# Patient Record
Sex: MALE | Race: WHITE | Employment: FULL TIME | URBAN - METROPOLITAN AREA
[De-identification: names, ages, dates, MRNs, and addresses within clinical notes are randomized per-mention and may not be internally consistent; named-entity substitution may affect disease eponyms.]

---

## 2017-06-07 ENCOUNTER — HOSPITAL ENCOUNTER (OUTPATIENT)
Dept: CT IMAGING | Age: 52
Discharge: HOME | End: 2017-06-07
Attending: FAMILY MEDICINE
Payer: COMMERCIAL

## 2017-06-07 VITALS — BODY MASS INDEX: 29.55 KG/M2 | HEIGHT: 68 IN | WEIGHT: 195 LBS

## 2017-06-07 DIAGNOSIS — R10.32 LLQ PAIN: ICD-10-CM

## 2017-06-07 PROCEDURE — 74011636320 HC RX REV CODE- 636/320: Performed by: FAMILY MEDICINE

## 2017-06-07 PROCEDURE — 74011000258 HC RX REV CODE- 258: Performed by: FAMILY MEDICINE

## 2017-06-07 PROCEDURE — 74177 CT ABD & PELVIS W/CONTRAST: CPT

## 2017-06-07 RX ORDER — SODIUM CHLORIDE 0.9 % (FLUSH) 0.9 %
10 SYRINGE (ML) INJECTION
Status: COMPLETED | OUTPATIENT
Start: 2017-06-07 | End: 2017-06-07

## 2017-06-07 RX ADMIN — DIATRIZOATE MEGLUMINE AND DIATRIZOATE SODIUM 15 ML: 660; 100 LIQUID ORAL; RECTAL at 09:19

## 2017-06-07 RX ADMIN — Medication 10 ML: at 09:19

## 2017-06-07 RX ADMIN — SODIUM CHLORIDE 100 ML: 900 INJECTION, SOLUTION INTRAVENOUS at 09:19

## 2017-06-07 RX ADMIN — IOPAMIDOL 100 ML: 755 INJECTION, SOLUTION INTRAVENOUS at 09:19

## 2017-12-07 ENCOUNTER — HOSPITAL ENCOUNTER (OUTPATIENT)
Dept: SURGERY | Age: 52
Discharge: HOME | End: 2017-12-07

## 2017-12-11 VITALS — HEIGHT: 67 IN | BODY MASS INDEX: 32.18 KG/M2 | WEIGHT: 205 LBS

## 2017-12-11 NOTE — PERIOP NOTES
Patient verified name, , and procedure. Type: 1a; abbreviated assessment per anesthesia guidelines  Labs per surgeon: none  Labs per anesthesia: none    Instructed pt that they will be notified via office/GI LAB for time of arrival to GI lab. Follow diet and prep instructions per office. May have clear liquids until 4 hours prior to time of arrival.     [place] Roxo or shower the night before and the am of surgery with antibacterial soap. No lotions, oils, powders, cologne on skin. No make up, eye make up or jewelry. Wear loose fitting comfortable, clean clothing. Must have adult present in building the entire time . Medications for the day of procedure none, patient to hold none    The following discharge instructions reviewed with patient: medication given during procedure may cause drowsiness for several hours, therefore, do not drive or operate machinery for remainder of the day. You may not drink alcohol on the day of your procedure, please resume regular diet and activity unless otherwise directed. You may experience abdominal distention for several hours that is relieved by the passage of gas. Contact your physician if you have any of the following: fever or chills, severe abdominal pain or excessive amount of bleeding or a large amount when having a bowel movement.  Occasional specks of blood with bowel movement would not be unusual.

## 2017-12-12 ENCOUNTER — ANESTHESIA EVENT (OUTPATIENT)
Dept: ENDOSCOPY | Age: 52
End: 2017-12-12
Payer: COMMERCIAL

## 2017-12-12 RX ORDER — ALBUTEROL SULFATE 0.83 MG/ML
2.5 SOLUTION RESPIRATORY (INHALATION) AS NEEDED
Status: CANCELLED | OUTPATIENT
Start: 2017-12-12

## 2017-12-12 RX ORDER — DIPHENHYDRAMINE HYDROCHLORIDE 50 MG/ML
12.5 INJECTION, SOLUTION INTRAMUSCULAR; INTRAVENOUS
Status: CANCELLED | OUTPATIENT
Start: 2017-12-12

## 2017-12-12 RX ORDER — OXYCODONE HYDROCHLORIDE 5 MG/1
10 TABLET ORAL
Status: CANCELLED | OUTPATIENT
Start: 2017-12-12

## 2017-12-12 RX ORDER — NALOXONE HYDROCHLORIDE 0.4 MG/ML
0.1 INJECTION, SOLUTION INTRAMUSCULAR; INTRAVENOUS; SUBCUTANEOUS AS NEEDED
Status: CANCELLED | OUTPATIENT
Start: 2017-12-12

## 2017-12-12 RX ORDER — MIDAZOLAM HYDROCHLORIDE 1 MG/ML
2 INJECTION, SOLUTION INTRAMUSCULAR; INTRAVENOUS
Status: CANCELLED | OUTPATIENT
Start: 2017-12-12

## 2017-12-12 RX ORDER — OXYCODONE HYDROCHLORIDE 5 MG/1
5 TABLET ORAL
Status: CANCELLED | OUTPATIENT
Start: 2017-12-12

## 2017-12-12 RX ORDER — MIDAZOLAM HYDROCHLORIDE 1 MG/ML
2 INJECTION, SOLUTION INTRAMUSCULAR; INTRAVENOUS ONCE
Status: CANCELLED | OUTPATIENT
Start: 2017-12-12 | End: 2017-12-12

## 2017-12-12 RX ORDER — HYDROMORPHONE HYDROCHLORIDE 2 MG/ML
0.5 INJECTION, SOLUTION INTRAMUSCULAR; INTRAVENOUS; SUBCUTANEOUS
Status: CANCELLED | OUTPATIENT
Start: 2017-12-12

## 2017-12-12 RX ORDER — SODIUM CHLORIDE, SODIUM LACTATE, POTASSIUM CHLORIDE, CALCIUM CHLORIDE 600; 310; 30; 20 MG/100ML; MG/100ML; MG/100ML; MG/100ML
100 INJECTION, SOLUTION INTRAVENOUS CONTINUOUS
Status: CANCELLED | OUTPATIENT
Start: 2017-12-12

## 2017-12-12 RX ORDER — FENTANYL CITRATE 50 UG/ML
100 INJECTION, SOLUTION INTRAMUSCULAR; INTRAVENOUS ONCE
Status: CANCELLED | OUTPATIENT
Start: 2017-12-12 | End: 2017-12-12

## 2017-12-12 RX ORDER — ONDANSETRON 2 MG/ML
4 INJECTION INTRAMUSCULAR; INTRAVENOUS ONCE
Status: CANCELLED | OUTPATIENT
Start: 2017-12-12 | End: 2017-12-12

## 2017-12-13 ENCOUNTER — ANESTHESIA (OUTPATIENT)
Dept: ENDOSCOPY | Age: 52
End: 2017-12-13
Payer: COMMERCIAL

## 2017-12-13 ENCOUNTER — HOSPITAL ENCOUNTER (OUTPATIENT)
Age: 52
Setting detail: OUTPATIENT SURGERY
Discharge: HOME | End: 2017-12-13
Attending: SURGERY | Admitting: SURGERY
Payer: COMMERCIAL

## 2017-12-13 VITALS
OXYGEN SATURATION: 95 % | SYSTOLIC BLOOD PRESSURE: 142 MMHG | HEART RATE: 69 BPM | HEIGHT: 67 IN | DIASTOLIC BLOOD PRESSURE: 93 MMHG | WEIGHT: 200.3 LBS | RESPIRATION RATE: 20 BRPM | TEMPERATURE: 98.6 F | BODY MASS INDEX: 31.44 KG/M2

## 2017-12-13 PROCEDURE — 74011250636 HC RX REV CODE- 250/636: Performed by: ANESTHESIOLOGY

## 2017-12-13 PROCEDURE — 74011000250 HC RX REV CODE- 250

## 2017-12-13 PROCEDURE — 74011000250 HC RX REV CODE- 250: Performed by: ANESTHESIOLOGY

## 2017-12-13 PROCEDURE — 76040000025: Performed by: SURGERY

## 2017-12-13 PROCEDURE — 74011250636 HC RX REV CODE- 250/636

## 2017-12-13 PROCEDURE — 76060000031 HC ANESTHESIA FIRST 0.5 HR: Performed by: SURGERY

## 2017-12-13 RX ORDER — LIDOCAINE HYDROCHLORIDE 20 MG/ML
INJECTION, SOLUTION EPIDURAL; INFILTRATION; INTRACAUDAL; PERINEURAL AS NEEDED
Status: DISCONTINUED | OUTPATIENT
Start: 2017-12-13 | End: 2017-12-13 | Stop reason: HOSPADM

## 2017-12-13 RX ORDER — LIDOCAINE HYDROCHLORIDE 10 MG/ML
0.1 INJECTION INFILTRATION; PERINEURAL AS NEEDED
Status: DISCONTINUED | OUTPATIENT
Start: 2017-12-13 | End: 2017-12-13 | Stop reason: HOSPADM

## 2017-12-13 RX ORDER — SODIUM CHLORIDE, SODIUM LACTATE, POTASSIUM CHLORIDE, CALCIUM CHLORIDE 600; 310; 30; 20 MG/100ML; MG/100ML; MG/100ML; MG/100ML
100 INJECTION, SOLUTION INTRAVENOUS CONTINUOUS
Status: DISCONTINUED | OUTPATIENT
Start: 2017-12-13 | End: 2017-12-13 | Stop reason: HOSPADM

## 2017-12-13 RX ORDER — PROPOFOL 10 MG/ML
INJECTION, EMULSION INTRAVENOUS AS NEEDED
Status: DISCONTINUED | OUTPATIENT
Start: 2017-12-13 | End: 2017-12-13 | Stop reason: HOSPADM

## 2017-12-13 RX ADMIN — PROPOFOL 25 MG: 10 INJECTION, EMULSION INTRAVENOUS at 12:15

## 2017-12-13 RX ADMIN — PROPOFOL 50 MG: 10 INJECTION, EMULSION INTRAVENOUS at 12:08

## 2017-12-13 RX ADMIN — PROPOFOL 25 MG: 10 INJECTION, EMULSION INTRAVENOUS at 12:19

## 2017-12-13 RX ADMIN — PROPOFOL 50 MG: 10 INJECTION, EMULSION INTRAVENOUS at 12:12

## 2017-12-13 RX ADMIN — LIDOCAINE HYDROCHLORIDE 0.1 ML: 10 INJECTION, SOLUTION INFILTRATION; PERINEURAL at 11:53

## 2017-12-13 RX ADMIN — PROPOFOL 75 MG: 10 INJECTION, EMULSION INTRAVENOUS at 12:06

## 2017-12-13 RX ADMIN — LIDOCAINE HYDROCHLORIDE 60 MG: 20 INJECTION, SOLUTION EPIDURAL; INFILTRATION; INTRACAUDAL; PERINEURAL at 12:06

## 2017-12-13 RX ADMIN — SODIUM CHLORIDE, SODIUM LACTATE, POTASSIUM CHLORIDE, AND CALCIUM CHLORIDE 100 ML/HR: 600; 310; 30; 20 INJECTION, SOLUTION INTRAVENOUS at 11:53

## 2017-12-13 RX ADMIN — PROPOFOL 25 MG: 10 INJECTION, EMULSION INTRAVENOUS at 12:11

## 2017-12-13 RX ADMIN — PROPOFOL 25 MG: 10 INJECTION, EMULSION INTRAVENOUS at 12:10

## 2017-12-13 NOTE — ANESTHESIA POSTPROCEDURE EVALUATION
Post-Anesthesia Evaluation and Assessment    Patient: Belkis Sánchez MRN: [id number]  SSN: [id number]    YOB: 1965  Age: 50 y.o. Sex: male       Cardiovascular Function/Vital Signs  Visit Vitals    /76    Pulse 85    Temp 37 °C (98.6 °F)    Resp 18    Ht 5' 7\" (1.702 m)    Wt 90.9 kg (200 lb 4.8 oz)    SpO2 96%    BMI 31.37 kg/m2       Patient is status post total IV anesthesia anesthesia for Procedure(s):  COLONOSCOPY  BMI 31. Nausea/Vomiting: None    Postoperative hydration reviewed and adequate. Pain:  Pain Scale 1: Numeric (0 - 10) (12/13/17 1123)  Pain Intensity 1: 0 (12/13/17 1123)   Managed    Neurological Status: At baseline    Mental Status and Level of Consciousness: Arousable    Pulmonary Status:   O2 Device: Nasal cannula (12/13/17 1228)   Adequate oxygenation and airway patent    Complications related to anesthesia: None    Post-anesthesia assessment completed.  No concerns    Signed By: Tommy Encarnacion MD     December 13, 2017

## 2017-12-13 NOTE — DISCHARGE INSTRUCTIONS
Kulwant Kidd M.D.  [phone]    Instructions following colonoscopy:    ACTIVITY:   Resume usual, basic activities around the house today.  You may be light-headed or sleepy from anesthesia, so be careful going up and down stairs.  Avoid driving, operating machinery, or signing documents for 24 hours. DIET:   No restriction. Please note, some people may have nausea or cramps after this procedure which can result in an upset stomach after eating.  Many people have loose stools or diarrhea immediately after colonoscopy. It is also not uncommon to not have a bowel movement for 2-3 days. PAIN:   Some cramping or gas pain is normal after colonoscopy. However, if you experience worsening pain over the course of the day, or pain with associated fever please call the office immediately      [address] IF:   You have a temperature higher than 101.5° Fahrenheit for more than 6 hours.  You have severe nausea or vomiting not relieved by medication; or diarrhea. If you take a blood thinning medicine resume it:    Otherwise, continue home medications as previously prescribed.

## 2017-12-13 NOTE — PROCEDURES
Procedure in Detail:  Informed consent was obtained for the procedure. The patient was placed in the left lateral decubitus position and sedation was induced by anesthesia. The [id number] was inserted into the rectum and advanced under direct vision to the cecum, which was identified by the ileocecal valve and appendiceal orifice. The quality of the colonic preparation was good. A careful inspection was made as the colonoscope was withdrawn, including a retroflexed view of the rectum; findings and interventions are described below. Appropriate photodocumentation was obtained. Findings:   Rectum:   Normal  Sigmoid:     - Excavated lesions:     - rare, shallow diverticulum  Descending Colon:   Normal  Transverse Colon:   Normal  Ascending Colon:   Normal  Cecum:   Normal            Specimens: No specimens were collected. Complications: None; patient tolerated the procedure well. \    EBL - none    Recommendations:   - For colon cancer screening in this average-risk patient, colonoscopy may be repeated in 10 years.      Signed By: Howie Bermudez MD                        December 13, 2017

## 2017-12-13 NOTE — IP AVS SNAPSHOT
[address] [facility] 
[phone] Patient: Minor Neighbors. MRN: [id number] :1965 About your hospitalization You were admitted on:  2017 You last received care in the:  [facility] PREOP You were discharged on:  2017 Why you were hospitalized Your primary diagnosis was:  Not on File Discharge Orders None A check brayan indicates which time of day the medication should be taken. My Medications ASK your physician about these medications Instructions Each Dose to Equal  
 Morning Noon Evening Bedtime CLARITIN 10 mg tablet Generic drug:  loratadine Your last dose was: Your next dose is: Take 10 mg by mouth daily. 10 mg Discharge Instructions Mohini Sarabia M.D. 
[phone] Instructions following colonoscopy: 
 
ACTIVITY: 
? Resume usual, basic activities around the house today. ? You may be light-headed or sleepy from anesthesia, so be careful going up and down stairs. ? Avoid driving, operating machinery, or signing documents for 24 hours. DIET: 
? No restriction. Please note, some people may have nausea or cramps after this procedure which can result in an upset stomach after eating. ? Many people have loose stools or diarrhea immediately after colonoscopy. It is also not uncommon to not have a bowel movement for 2-3 days. PAIN: 
? Some cramping or gas pain is normal after colonoscopy. However, if you experience worsening pain over the course of the day, or pain with associated fever please call the office immediately CALL THE DOCTOR IF: 
? You have a temperature higher than 101.5° Fahrenheit for more than 6 hours. ? You have severe nausea or vomiting not relieved by medication; or diarrhea. If you take a blood thinning medicine resume it: Otherwise, continue home medications as previously prescribed. Introducing [facility]! [employer] introduces [employer] patient portal. Now you can access parts of your medical record, email your doctor's office, and request medication refills online. 1. In your internet browser, go to [email or web address] 2. Click on the First Time User? Click Here link in the Sign In box. You will see the New Member Sign Up page. 3. Enter your [employer] Access Code exactly as it appears below. You will not need to use this code after youve completed the sign-up process. If you do not sign up before the expiration date, you must request a new code. · [employer] Access Code: [email or web address] Expires: 3/12/2018 10:38 AM 
 
4. Enter the last four digits of your Social Security Number (xxxx) and Date of Birth (mm/dd/yyyy) as indicated and click Submit. You will be taken to the next sign-up page. 5. Create a [employer] ID. This will be your [employer] login ID and cannot be changed, so think of one that is secure and easy to remember. 6. Create a [employer] password. You can change your password at any time. 7. Enter your Password Reset Question and Answer. This can be used at a later time if you forget your password. 8. Enter your e-mail address. You will receive e-mail notification when new information is available in [address] 9. Click Sign Up. You can now view and download portions of your medical record. 10. Click the Download Summary menu link to download a portable copy of your medical information. If you have questions, please visit the Frequently Asked Questions section of the [employer] website. Remember, [employer] is NOT to be used for urgent needs. For medical emergencies, dial 911. Now available from your iPhone and Android! Providers Seen During Your Hospitalization Provider Specialty Primary office phone Sherice Jama MD General Surgery [phone] Your Primary Care Physician (PCP) Primary Care Physician Office Phone Office Fax Via Roger Agrawal [address] [place] [address] [phone] You are allergic to the following Allergen Reactions Darvocet A500 (Propoxyphene N-Acetaminophen) Nausea and Vomiting Recent Documentation Height Weight BMI Smoking Status 1.702 m 90.9 kg 31.37 kg/m2 Never Smoker Emergency Contacts Name Discharge Info Relation Home Work Mobile Parker Carlos [3] [phone] Patient Belongings The following personal items are in your possession at time of discharge: 
  Dental Appliances: None Please provide this summary of care documentation to your next provider. Signatures-by signing, you are acknowledging that this After Visit Summary has been reviewed with you and you have received a copy. Patient Signature:  ____________________________________________________________ Date:  ____________________________________________________________  
  
Rey Reyna Provider Signature:  ____________________________________________________________ Date:  ____________________________________________________________

## 2017-12-13 NOTE — H&P
Colonoscopy History and Physical      Patient: Meliton Walsh. Physician: Laura Sanchez MD    Referring Physician: Ainsley Garcia MD    Chief Complaint: For colonoscopy    History of Present Illness: Pt presents for colonoscopy. Prior (-) exam 2008 in f/u to percutaneous drainage of diverticular abscess. Had short flare of diverticulitis this past May. History:  Past Medical History:   Diagnosis Date    Diverticulosis     H/O seasonal allergies     Unspecified sleep apnea     C-Pap     Past Surgical History:   Procedure Laterality Date    ABDOMEN SURGERY PROC UNLISTED  2008    perc drain diverticular abscess    HX COLONOSCOPY  2008    no polyps    HX HERNIA REPAIR      HX ORTHOPAEDIC Right 2009    rolled bicept      Social History     Social History    Marital status:      Spouse name: N/A    Number of children: N/A    Years of education: N/A     Social History Main Topics    Smoking status: Never Smoker    Smokeless tobacco: Never Used    Alcohol use No    Drug use: No    Sexual activity: Yes     Partners: Female     Other Topics Concern    None     Social History Narrative      Family History   Problem Relation Age of Onset    Diabetes Mother     COPD Mother     Kidney Disease Mother     Cancer Father      prostate    Other Father      abdominal aortic aneurysm    Diabetes Maternal Grandmother     Diabetes Sister     Malignant Hyperthermia Neg Hx     Pseudocholinesterase Deficiency Neg Hx     Delayed Awakening Neg Hx     Post-op Nausea/Vomiting Neg Hx     Emergence Delirium Neg Hx     Post-op Cognitive Dysfunction Neg Hx        Medications:   Prior to Admission medications    Medication Sig Start Date End Date Taking? Authorizing Provider   loratadine (CLARITIN) 10 mg tablet Take 10 mg by mouth daily. Historical Provider       Allergies:    Allergies   Allergen Reactions    Darvocet A500 [Propoxyphene N-Acetaminophen] Nausea and Vomiting       Physical Exam:     Vital Signs:   Visit Vitals    BP (!) 142/95    Pulse 63    Temp 98.6 °F (37 °C)    Resp 16    Ht 5' 7\" (1.702 m)    Wt 200 lb 4.8 oz (90.9 kg)    SpO2 96%    BMI 31.37 kg/m2     . General: in NAD      Heart: regular   Lungs: unlabored   Abdominal: soft   Neurological: grossly normal        Findings/Diagnosis: Screening for colorectal cancer     Plan of Care/Planned Procedure: Colonoscopy. Risks of endoscopy include  bleeding, perforation. They understand and agree to proceed.       Signed:  Michelle Bradford MD   12/13/2017

## 2017-12-13 NOTE — ANESTHESIA PREPROCEDURE EVALUATION
Anesthetic History               Review of Systems / Medical History  Patient summary reviewed, nursing notes reviewed and pertinent labs reviewed    Pulmonary                   Neuro/Psych              Cardiovascular                  Exercise tolerance: >4 METS     GI/Hepatic/Renal                Endo/Other             Other Findings              Physical Exam    Airway  Mallampati: I  TM Distance: 4 - 6 cm  Neck ROM: normal range of motion   Mouth opening: Normal     Cardiovascular  Regular rate and rhythm,  S1 and S2 normal,  no murmur, click, rub, or gallop             Dental  No notable dental hx       Pulmonary  Breath sounds clear to auscultation               Abdominal         Other Findings            Anesthetic Plan    ASA: 1  Anesthesia type: total IV anesthesia          Induction: Intravenous  Anesthetic plan and risks discussed with: Patient

## 2018-10-12 RX ORDER — SODIUM CHLORIDE 0.9 % (FLUSH) 0.9 %
5-10 SYRINGE (ML) INJECTION EVERY 8 HOURS
Status: CANCELLED | OUTPATIENT
Start: 2018-10-12

## 2018-10-12 RX ORDER — SODIUM CHLORIDE 0.9 % (FLUSH) 0.9 %
5-10 SYRINGE (ML) INJECTION AS NEEDED
Status: CANCELLED | OUTPATIENT
Start: 2018-10-12

## 2018-10-17 RX ORDER — SODIUM CHLORIDE 0.9 % (FLUSH) 0.9 %
5-10 SYRINGE (ML) INJECTION AS NEEDED
Status: CANCELLED | OUTPATIENT
Start: 2018-10-17

## 2018-10-17 RX ORDER — SODIUM CHLORIDE 0.9 % (FLUSH) 0.9 %
5-10 SYRINGE (ML) INJECTION EVERY 8 HOURS
Status: CANCELLED | OUTPATIENT
Start: 2018-10-17

## 2018-10-18 ENCOUNTER — ANESTHESIA EVENT (OUTPATIENT)
Dept: SURGERY | Age: 53
End: 2018-10-18
Payer: COMMERCIAL

## 2018-10-18 RX ORDER — SODIUM CHLORIDE, SODIUM LACTATE, POTASSIUM CHLORIDE, CALCIUM CHLORIDE 600; 310; 30; 20 MG/100ML; MG/100ML; MG/100ML; MG/100ML
125 INJECTION, SOLUTION INTRAVENOUS CONTINUOUS
Status: CANCELLED | OUTPATIENT
Start: 2018-10-18

## 2018-10-18 RX ORDER — NALOXONE HYDROCHLORIDE 0.4 MG/ML
0.1 INJECTION, SOLUTION INTRAMUSCULAR; INTRAVENOUS; SUBCUTANEOUS AS NEEDED
Status: CANCELLED | OUTPATIENT
Start: 2018-10-18

## 2018-10-18 RX ORDER — SODIUM CHLORIDE 0.9 % (FLUSH) 0.9 %
5-10 SYRINGE (ML) INJECTION AS NEEDED
Status: CANCELLED | OUTPATIENT
Start: 2018-10-18

## 2018-10-18 RX ORDER — HYDROMORPHONE HYDROCHLORIDE 2 MG/ML
0.5 INJECTION, SOLUTION INTRAMUSCULAR; INTRAVENOUS; SUBCUTANEOUS
Status: CANCELLED | OUTPATIENT
Start: 2018-10-18

## 2018-10-18 RX ORDER — KETOROLAC TROMETHAMINE 30 MG/ML
30 INJECTION, SOLUTION INTRAMUSCULAR; INTRAVENOUS AS NEEDED
Status: CANCELLED | OUTPATIENT
Start: 2018-10-18 | End: 2018-10-18

## 2018-10-18 RX ORDER — OXYCODONE HYDROCHLORIDE 5 MG/1
10 TABLET ORAL
Status: CANCELLED | OUTPATIENT
Start: 2018-10-18 | End: 2018-10-19

## 2018-10-19 ENCOUNTER — ANESTHESIA (OUTPATIENT)
Dept: SURGERY | Age: 53
End: 2018-10-19
Payer: COMMERCIAL

## 2018-10-19 ENCOUNTER — HOSPITAL ENCOUNTER (OUTPATIENT)
Age: 53
Setting detail: OUTPATIENT SURGERY
Discharge: HOME | End: 2018-10-19
Attending: ORTHOPAEDIC SURGERY | Admitting: ORTHOPAEDIC SURGERY
Payer: COMMERCIAL

## 2018-10-19 VITALS
BODY MASS INDEX: 32.11 KG/M2 | TEMPERATURE: 97.8 F | HEART RATE: 66 BPM | SYSTOLIC BLOOD PRESSURE: 125 MMHG | WEIGHT: 205 LBS | RESPIRATION RATE: 15 BRPM | OXYGEN SATURATION: 99 % | DIASTOLIC BLOOD PRESSURE: 79 MMHG

## 2018-10-19 PROCEDURE — 74011250636 HC RX REV CODE- 250/636

## 2018-10-19 PROCEDURE — 77030038020 HC MANFLD NEPTUNE STRY -B: Performed by: ORTHOPAEDIC SURGERY

## 2018-10-19 PROCEDURE — 74011250637 HC RX REV CODE- 250/637: Performed by: ANESTHESIOLOGY

## 2018-10-19 PROCEDURE — 76010000154 HC OR TIME FIRST 0.5 HR: Performed by: ORTHOPAEDIC SURGERY

## 2018-10-19 PROCEDURE — 77030020143 HC AIRWY LARYN INTUB CGAS -A: Performed by: ANESTHESIOLOGY

## 2018-10-19 PROCEDURE — 77030029203 HC IRR KT CYSTO/TUR BBMI -A: Performed by: ORTHOPAEDIC SURGERY

## 2018-10-19 PROCEDURE — 76060000032 HC ANESTHESIA 0.5 TO 1 HR: Performed by: ORTHOPAEDIC SURGERY

## 2018-10-19 PROCEDURE — 77030000032 HC CUF TRNQT ZIMM -B: Performed by: ORTHOPAEDIC SURGERY

## 2018-10-19 PROCEDURE — 74011250636 HC RX REV CODE- 250/636: Performed by: ORTHOPAEDIC SURGERY

## 2018-10-19 PROCEDURE — 76210000063 HC OR PH I REC FIRST 0.5 HR: Performed by: ORTHOPAEDIC SURGERY

## 2018-10-19 PROCEDURE — 76210000020 HC REC RM PH II FIRST 0.5 HR: Performed by: ORTHOPAEDIC SURGERY

## 2018-10-19 PROCEDURE — 77030006668 HC BLD SHV MENSCS STRY -B: Performed by: ORTHOPAEDIC SURGERY

## 2018-10-19 PROCEDURE — 74011250636 HC RX REV CODE- 250/636: Performed by: ANESTHESIOLOGY

## 2018-10-19 PROCEDURE — 77030018836 HC SOL IRR NACL ICUM -A: Performed by: ORTHOPAEDIC SURGERY

## 2018-10-19 RX ORDER — PROPOFOL 10 MG/ML
INJECTION, EMULSION INTRAVENOUS AS NEEDED
Status: DISCONTINUED | OUTPATIENT
Start: 2018-10-19 | End: 2018-10-19 | Stop reason: HOSPADM

## 2018-10-19 RX ORDER — ACETAMINOPHEN 500 MG
1000 TABLET ORAL ONCE
Status: DISCONTINUED | OUTPATIENT
Start: 2018-10-19 | End: 2018-10-19 | Stop reason: HOSPADM

## 2018-10-19 RX ORDER — SODIUM CHLORIDE 0.9 % (FLUSH) 0.9 %
5-10 SYRINGE (ML) INJECTION AS NEEDED
Status: DISCONTINUED | OUTPATIENT
Start: 2018-10-19 | End: 2018-10-19 | Stop reason: HOSPADM

## 2018-10-19 RX ORDER — ROPIVACAINE HYDROCHLORIDE 5 MG/ML
INJECTION, SOLUTION EPIDURAL; INFILTRATION; PERINEURAL AS NEEDED
Status: DISCONTINUED | OUTPATIENT
Start: 2018-10-19 | End: 2018-10-19 | Stop reason: HOSPADM

## 2018-10-19 RX ORDER — SODIUM CHLORIDE 0.9 % (FLUSH) 0.9 %
5-10 SYRINGE (ML) INJECTION EVERY 8 HOURS
Status: DISCONTINUED | OUTPATIENT
Start: 2018-10-19 | End: 2018-10-19 | Stop reason: HOSPADM

## 2018-10-19 RX ORDER — KETOROLAC TROMETHAMINE 30 MG/ML
INJECTION, SOLUTION INTRAMUSCULAR; INTRAVENOUS AS NEEDED
Status: DISCONTINUED | OUTPATIENT
Start: 2018-10-19 | End: 2018-10-19 | Stop reason: HOSPADM

## 2018-10-19 RX ORDER — LIDOCAINE HYDROCHLORIDE 20 MG/ML
INJECTION, SOLUTION EPIDURAL; INFILTRATION; INTRACAUDAL; PERINEURAL AS NEEDED
Status: DISCONTINUED | OUTPATIENT
Start: 2018-10-19 | End: 2018-10-19 | Stop reason: HOSPADM

## 2018-10-19 RX ORDER — DEXAMETHASONE SODIUM PHOSPHATE 4 MG/ML
INJECTION, SOLUTION INTRA-ARTICULAR; INTRALESIONAL; INTRAMUSCULAR; INTRAVENOUS; SOFT TISSUE AS NEEDED
Status: DISCONTINUED | OUTPATIENT
Start: 2018-10-19 | End: 2018-10-19 | Stop reason: HOSPADM

## 2018-10-19 RX ORDER — SODIUM CHLORIDE, SODIUM LACTATE, POTASSIUM CHLORIDE, CALCIUM CHLORIDE 600; 310; 30; 20 MG/100ML; MG/100ML; MG/100ML; MG/100ML
125 INJECTION, SOLUTION INTRAVENOUS CONTINUOUS
Status: DISCONTINUED | OUTPATIENT
Start: 2018-10-19 | End: 2018-10-19 | Stop reason: HOSPADM

## 2018-10-19 RX ORDER — FENTANYL CITRATE 50 UG/ML
INJECTION, SOLUTION INTRAMUSCULAR; INTRAVENOUS AS NEEDED
Status: DISCONTINUED | OUTPATIENT
Start: 2018-10-19 | End: 2018-10-19 | Stop reason: HOSPADM

## 2018-10-19 RX ORDER — LIDOCAINE HYDROCHLORIDE 10 MG/ML
0.1 INJECTION INFILTRATION; PERINEURAL AS NEEDED
Status: DISCONTINUED | OUTPATIENT
Start: 2018-10-19 | End: 2018-10-19 | Stop reason: HOSPADM

## 2018-10-19 RX ORDER — ONDANSETRON 2 MG/ML
INJECTION INTRAMUSCULAR; INTRAVENOUS AS NEEDED
Status: DISCONTINUED | OUTPATIENT
Start: 2018-10-19 | End: 2018-10-19 | Stop reason: HOSPADM

## 2018-10-19 RX ORDER — MIDAZOLAM HYDROCHLORIDE 1 MG/ML
2 INJECTION, SOLUTION INTRAMUSCULAR; INTRAVENOUS
Status: DISCONTINUED | OUTPATIENT
Start: 2018-10-19 | End: 2018-10-19 | Stop reason: HOSPADM

## 2018-10-19 RX ADMIN — ONDANSETRON 4 MG: 2 INJECTION INTRAMUSCULAR; INTRAVENOUS at 12:15

## 2018-10-19 RX ADMIN — LIDOCAINE HYDROCHLORIDE 80 MG: 20 INJECTION, SOLUTION EPIDURAL; INFILTRATION; INTRACAUDAL; PERINEURAL at 12:01

## 2018-10-19 RX ADMIN — FENTANYL CITRATE 25 MCG: 50 INJECTION, SOLUTION INTRAMUSCULAR; INTRAVENOUS at 12:09

## 2018-10-19 RX ADMIN — PROPOFOL 200 MG: 10 INJECTION, EMULSION INTRAVENOUS at 12:01

## 2018-10-19 RX ADMIN — MIDAZOLAM HYDROCHLORIDE 2 MG: 2 INJECTION, SOLUTION INTRAMUSCULAR; INTRAVENOUS at 11:30

## 2018-10-19 RX ADMIN — FENTANYL CITRATE 50 MCG: 50 INJECTION, SOLUTION INTRAMUSCULAR; INTRAVENOUS at 12:01

## 2018-10-19 RX ADMIN — KETOROLAC TROMETHAMINE 30 MG: 30 INJECTION, SOLUTION INTRAMUSCULAR; INTRAVENOUS at 12:15

## 2018-10-19 RX ADMIN — ACETAMINOPHEN 1000 MG: 500 TABLET ORAL at 11:30

## 2018-10-19 RX ADMIN — SODIUM CHLORIDE, SODIUM LACTATE, POTASSIUM CHLORIDE, AND CALCIUM CHLORIDE: 600; 310; 30; 20 INJECTION, SOLUTION INTRAVENOUS at 11:55

## 2018-10-19 RX ADMIN — DEXAMETHASONE SODIUM PHOSPHATE 4 MG: 4 INJECTION, SOLUTION INTRA-ARTICULAR; INTRALESIONAL; INTRAMUSCULAR; INTRAVENOUS; SOFT TISSUE at 12:06

## 2018-10-19 NOTE — ANESTHESIA POSTPROCEDURE EVALUATION
Procedure(s): 
KNEE ARTHROSCOPY/ RIGHT/ MEDIAL AND LATERAL MENISCECTOMY. Anesthesia Post Evaluation Patient location during evaluation: PACU Patient participation: complete - patient participated Level of consciousness: responsive to verbal stimuli Pain management: adequate Airway patency: patent Anesthetic complications: no 
Cardiovascular status: acceptable Respiratory status: acceptable Hydration status: euvolemic Visit Vitals /73 Pulse (!) 57 Temp 36.6 °C (97.8 °F) Resp 15 Wt 93 kg (205 lb) SpO2 96% BMI 32.11 kg/m²

## 2018-10-19 NOTE — ANESTHESIA PREPROCEDURE EVALUATION
Anesthetic History Review of Systems / Medical History Patient summary reviewed, nursing notes reviewed and pertinent labs reviewed Pulmonary Sleep apnea: No treatment Neuro/Psych Cardiovascular Exercise tolerance: >4 METS 
  
GI/Hepatic/Renal 
  
 
 
 
 
 
 Endo/Other Other Findings Physical Exam 
 
Airway Mallampati: I 
TM Distance: > 6 cm Neck ROM: normal range of motion Mouth opening: Normal 
 
 Cardiovascular Regular rate and rhythm,  S1 and S2 normal,  no murmur, click, rub, or gallop Dental 
No notable dental hx Pulmonary Breath sounds clear to auscultation Abdominal 
GI exam deferred Other Findings Anesthetic Plan ASA: 1 Anesthesia type: general 
 
 
 
 
 
Anesthetic plan and risks discussed with: Patient and Spouse

## 2018-10-19 NOTE — BRIEF OP NOTE
BRIEF OPERATIVE NOTE Date of Procedure: 10/19/2018 Preoperative Diagnosis: Complex tear of medial meniscus of right knee as current injury, initial encounter [K23.856Z] Postoperative Diagnosis: Right knee medial and lateral meniscus tear Procedure(s): 
KNEE ARTHROSCOPY/ RIGHT/ MEDIAL AND LATERAL MENISCECTOMY Surgeon(s) and Role: Erna Burgos MD - Primary Surgical Assistant:  
 
 
 
 
Surgical Staff: 
Circ-1: Lisa Brumfield RN Scrub Tech-1: Donis Cardoza Event Time In Time Out Incision Start 1210 Incision Close 1218 Anesthesia: General  
Estimated Blood Loss:  
 
Specimens: * No specimens in log * Findings:  
  
Complications:  
 
Implants: * No implants in log *

## 2018-10-19 NOTE — OP NOTES
[facility] [address]  OPERATIVE REPORT    Sanford Rosales., Phineas Soulier  MR#: [id number]  : 1965  ACCOUNT #: [de-identified]   DATE OF SERVICE: 10/19/2018    PREOPERATIVE DIAGNOSIS:  Right medial meniscus tear. POSTOPERATIVE DIAGNOSES:  1. Tear of the posterior horn of the right medial meniscus. 2.  Flap tear of the lateral meniscus. 3.  Thickened hypertrophic medial synovial plica. PROCEDURES PERFORMED:  1. Arthroscopic medial and lateral meniscectomy. 2.  Excision of thickened hypertrophic medial synovial plica. 3.  Chondroplasty of the medial femoral condyle. SURGEON:  Kit Chang MD.    ASSISTANT: none    ANESTHESIA:  General.    SPECIMENS REMOVED: none    ESTIMATED BLOOD LOSS:  Minimal.    COMPLICATIONS:  None. IMPLANTS:  None. DESCRIPTION OF PROCEDURE:  After an adequate level of general anesthesia was obtained, the right leg was prepped and draped in the usual sterile fashion. A tourniquet was used for the procedure. Photos made for the patient anteromedial and anterolateral portals made. Findings revealed normal tracking of the patella. There was a very large thickened hypertrophic medial synovial plica which was excised with the baskets and the shaver. In the medial compartment, the patient had a tear of the posterior 1/2 of the medial meniscus and the tear was resected with the baskets and the shaver. There was some mild chondromalacia. The ACL and PCL intact. The gutters were clear. In the lateral compartment, there was a tear of the posterior horn of the lateral meniscus resected with a basket and shaver and left with a stable rim. The knee was then copiously irrigated. Sterile dressing was applied.       MD Monica Stone / SOLITARIO  D: 10/19/2018 12:20     T: 10/19/2018 12:38  JOB #: [id number]  CC: Jaime Lawler MD

## 2018-10-19 NOTE — H&P
Outpatient Surgery History and Physical: 
Rosa Will was seen and examined. CHIEF COMPLAINT:    r knee . Consent PE: There were no vitals taken for this visit. Heart:   Regular rhythm Lungs:  Are clear Past Medical History:   
Patient Active Problem List  
 Diagnosis  Hyperlipemia  Sleep apnea  HA (headache)  FH: prostate cancer  FHx: AAA  Allergic rhinitis  Diverticulosis of colon without diverticulitis Surgical History:  
Past Surgical History:  
Procedure Laterality Date  ABDOMEN SURGERY PROC UNLISTED  2008  
 perc drain diverticular abscess  HX COLONOSCOPY  2008  
 no polyps  HX HERNIA REPAIR    
 HX ORTHOPAEDIC Right 2009  
 rolled bicep  NM COLONOSCOPY FLX DX W/COLLJ SPEC WHEN PFRMD  12/13/2017 Social History: Patient  reports that  has never smoked. he has never used smokeless tobacco. He reports that he does not drink alcohol or use drugs. Family History:  
Family History Problem Relation Age of Onset  Diabetes Mother  COPD Mother  Kidney Disease Mother  Cancer Father   
     prostate  Other Father   
     abdominal aortic aneurysm  Diabetes Maternal Grandmother  Diabetes Sister  Malignant Hyperthermia Neg Hx  Pseudocholinesterase Deficiency Neg Hx  Delayed Awakening Neg Hx  Post-op Nausea/Vomiting Neg Hx  Emergence Delirium Neg Hx  Post-op Cognitive Dysfunction Neg Hx Allergies: Reviewed per EMR Allergies Allergen Reactions  Darvocet A500 [Propoxyphene N-Acetaminophen] Nausea and Vomiting Medications: No current facility-administered medications on file prior to encounter. Current Outpatient Medications on File Prior to Encounter Medication Sig  loratadine (CLARITIN) 10 mg tablet Take 10 mg by mouth daily. The surgery is planned for the  Knee . History and physical has been reviewed. The patient has been examined. There have been no significant clinical changes since the completion of the originally dated History and Physical. 
Patient identified by surgeon; surgical site was confirmed by patient and surgeon. The patient is here today for outpatient surgery. I have examined the patient, no changes are noted in the patient's medical status. Necessity for the procedure/care is still present and the history and physical above is current. See the office notes for the full long term history of the problem. Please see the recent office notes for the musculoskeletal examination.  
 
Signed By: Sherren Filter, MD   
 October 19, 2018 7:10 AM

## 2018-10-19 NOTE — DISCHARGE INSTRUCTIONS
ACTIVITY  · As tolerated and as directed by your doctor. · Bathe or shower as directed by your doctor. DIET  · Clear liquids until no nausea or vomiting; then light diet for the first day. · Advance to regular diet on second day, unless your doctor orders otherwise. · If nausea and vomiting continues, call your doctor. PAIN  · Take pain medication as directed by your doctor. · Call your doctor if pain is NOT relieved by medication. · DO NOT take aspirin of blood thinners unless directed by your doctor. DRESSING CARE       CALL YOUR DOCTOR IF   · Excessive bleeding that does not stop after holding pressure over the area  · Temperature of 101 degrees F or above  · Excessive redness, swelling or bruising, and/ or green or yellow, smelly discharge from incision    AFTER ANESTHESIA   · For the first 24 hours: DO NOT Drive, Drink alcoholic beverages, or Make important decisions. · Be aware of dizziness following anesthesia and while taking pain medication. APPOINTMENT DATE/ TIME    YOUR DOCTOR'S PHONE NUMBER       DISCHARGE SUMMARY from Nurse    PATIENT INSTRUCTIONS:    After general anesthesia or intravenous sedation, for 24 hours or while taking prescription Narcotics:  · Limit your activities  · Do not drive and operate hazardous machinery  · Do not make important personal or business decisions  · Do  not drink alcoholic beverages  · If you have not urinated within 8 hours after discharge, please contact your surgeon on call. *  Please give a list of your current medications to your Primary Care Provider. *  Please update this list whenever your medications are discontinued, doses are      changed, or new medications (including over-the-counter products) are added. *  Please carry medication information at all times in case of emergency situations.       These are general instructions for a healthy lifestyle:    No smoking/ No tobacco products/ Avoid exposure to second hand smoke    Surgeon General's Warning:  Quitting smoking now greatly reduces serious risk to your health. Obesity, smoking, and sedentary lifestyle greatly increases your risk for illness    A healthy diet, regular physical exercise & weight monitoring are important for maintaining a healthy lifestyle    You may be retaining fluid if you have a history of heart failure or if you experience any of the following symptoms:  Weight gain of 3 pounds or more overnight or 5 pounds in a week, increased swelling in our hands or feet or shortness of breath while lying flat in bed. Please call your doctor as soon as you notice any of these symptoms; do not wait until your next office visit. Recognize signs and symptoms of STROKE:    F-face looks uneven    A-arms unable to move or move unevenly    S-speech slurred or non-existent    T-time-call 911 as soon as signs and symptoms begin-DO NOT go       Back to bed or wait to see if you get better-TIME IS BRAIN. POST OPERATIVE INSTRUCTIONS FOR KNEE ARTHROSCOPY    1. Unless you receive other instructions, you may weight bear as tolerated      2. Apply ice to your knee for 20-30 minutes several times per day for the first 3 days and then as needed. Icing will decrease the amount of inflammation and is helpful after exercise    3. You may remove the dressings the following day and apply waterproof band aids. Some bleeding and drainage may persist for several days following  surgery. Waterproof band aids may be used while showering and avoid tub baths for two weeks. 4. You will be given pain medications and do not drive under the influence. Some patients take pain medication at night and antiinflammatory medication during day  when pain decreases. 5. You may be given Phenergan for nausea    6. You will receive a phone call from our office notifying you of your follow up visit    7.  If any problems call [phone]

## 2020-10-05 PROBLEM — F51.02 TRANSIENT DISORDER OF INITIATING OR MAINTAINING SLEEP: Status: ACTIVE | Noted: 2020-10-05

## 2020-10-05 PROBLEM — E66.9 OBESITY (BMI 30.0-34.9): Status: ACTIVE | Noted: 2020-10-05

## 2021-10-14 ENCOUNTER — HOSPITAL ENCOUNTER (INPATIENT)
Age: 56
LOS: 8 days | Discharge: HOME HEALTH | DRG: 392 | End: 2021-10-22
Attending: EMERGENCY MEDICINE | Admitting: INTERNAL MEDICINE
Payer: COMMERCIAL

## 2021-10-14 ENCOUNTER — APPOINTMENT (OUTPATIENT)
Dept: CT IMAGING | Age: 56
DRG: 392 | End: 2021-10-14
Attending: PHYSICIAN ASSISTANT
Payer: COMMERCIAL

## 2021-10-14 DIAGNOSIS — R10.32 LLQ PAIN: ICD-10-CM

## 2021-10-14 DIAGNOSIS — R10.32 ABDOMINAL PAIN, LLQ (LEFT LOWER QUADRANT): Primary | ICD-10-CM

## 2021-10-14 DIAGNOSIS — R51.9 ACUTE INTRACTABLE HEADACHE, UNSPECIFIED HEADACHE TYPE: ICD-10-CM

## 2021-10-14 DIAGNOSIS — K57.20 ABSCESS OF SIGMOID COLON DUE TO DIVERTICULITIS: ICD-10-CM

## 2021-10-14 DIAGNOSIS — Z87.19 HISTORY OF DIVERTICULITIS: ICD-10-CM

## 2021-10-14 DIAGNOSIS — K57.32 SIGMOID DIVERTICULITIS: ICD-10-CM

## 2021-10-14 DIAGNOSIS — R10.32 LEFT LOWER QUADRANT ABDOMINAL PAIN: ICD-10-CM

## 2021-10-14 DIAGNOSIS — K57.30 DIVERTICULOSIS OF COLON WITHOUT DIVERTICULITIS: ICD-10-CM

## 2021-10-14 LAB
ALBUMIN SERPL-MCNC: 3.8 G/DL (ref 3.5–5)
ALBUMIN/GLOB SERPL: 0.7 {RATIO} (ref 1.2–3.5)
ALP SERPL-CCNC: 77 U/L (ref 50–136)
ALT SERPL-CCNC: 32 U/L (ref 12–65)
ANION GAP SERPL CALC-SCNC: 8 MMOL/L (ref 7–16)
AST SERPL-CCNC: 12 U/L (ref 15–37)
BASOPHILS # BLD: 0 K/UL (ref 0–0.2)
BASOPHILS NFR BLD: 0 % (ref 0–2)
BILIRUB SERPL-MCNC: 0.6 MG/DL (ref 0.2–1.1)
BUN SERPL-MCNC: 14 MG/DL (ref 6–23)
CALCIUM SERPL-MCNC: 9.6 MG/DL (ref 8.3–10.4)
CHLORIDE SERPL-SCNC: 103 MMOL/L (ref 98–107)
CO2 SERPL-SCNC: 28 MMOL/L (ref 21–32)
CREAT SERPL-MCNC: 0.98 MG/DL (ref 0.8–1.5)
DIFFERENTIAL METHOD BLD: ABNORMAL
EOSINOPHIL # BLD: 0.1 K/UL (ref 0–0.8)
EOSINOPHIL NFR BLD: 1 % (ref 0.5–7.8)
ERYTHROCYTE [DISTWIDTH] IN BLOOD BY AUTOMATED COUNT: 12.3 % (ref 11.9–14.6)
GLOBULIN SER CALC-MCNC: 5.3 G/DL (ref 2.3–3.5)
GLUCOSE SERPL-MCNC: 147 MG/DL (ref 65–100)
HCT VFR BLD AUTO: 42.6 % (ref 41.1–50.3)
HGB BLD-MCNC: 14.3 G/DL (ref 13.6–17.2)
IMM GRANULOCYTES # BLD AUTO: 0 K/UL (ref 0–0.5)
IMM GRANULOCYTES NFR BLD AUTO: 0 % (ref 0–5)
LIPASE SERPL-CCNC: 56 U/L (ref 73–393)
LYMPHOCYTES # BLD: 1.5 K/UL (ref 0.5–4.6)
LYMPHOCYTES NFR BLD: 12 % (ref 13–44)
MCH RBC QN AUTO: 30.4 PG (ref 26.1–32.9)
MCHC RBC AUTO-ENTMCNC: 33.6 G/DL (ref 31.4–35)
MCV RBC AUTO: 90.4 FL (ref 79.6–97.8)
MONOCYTES # BLD: 0.9 K/UL (ref 0.1–1.3)
MONOCYTES NFR BLD: 7 % (ref 4–12)
NEUTS SEG # BLD: 10.1 K/UL (ref 1.7–8.2)
NEUTS SEG NFR BLD: 80 % (ref 43–78)
NRBC # BLD: 0 K/UL (ref 0–0.2)
PLATELET # BLD AUTO: 293 K/UL (ref 150–450)
PMV BLD AUTO: 9.2 FL (ref 9.4–12.3)
POTASSIUM SERPL-SCNC: 3.5 MMOL/L (ref 3.5–5.1)
PROT SERPL-MCNC: 9.1 G/DL (ref 6.3–8.2)
RBC # BLD AUTO: 4.71 M/UL (ref 4.23–5.6)
SODIUM SERPL-SCNC: 139 MMOL/L (ref 136–145)
WBC # BLD AUTO: 12.6 K/UL (ref 4.3–11.1)

## 2021-10-14 PROCEDURE — 96375 TX/PRO/DX INJ NEW DRUG ADDON: CPT

## 2021-10-14 PROCEDURE — 83690 ASSAY OF LIPASE: CPT

## 2021-10-14 PROCEDURE — 65270000029 HC RM PRIVATE

## 2021-10-14 PROCEDURE — 87040 BLOOD CULTURE FOR BACTERIA: CPT

## 2021-10-14 PROCEDURE — 99255 IP/OBS CONSLTJ NEW/EST HI 80: CPT | Performed by: SURGERY

## 2021-10-14 PROCEDURE — 74011250637 HC RX REV CODE- 250/637: Performed by: INTERNAL MEDICINE

## 2021-10-14 PROCEDURE — 74011250636 HC RX REV CODE- 250/636: Performed by: INTERNAL MEDICINE

## 2021-10-14 PROCEDURE — 74177 CT ABD & PELVIS W/CONTRAST: CPT

## 2021-10-14 PROCEDURE — 96374 THER/PROPH/DIAG INJ IV PUSH: CPT

## 2021-10-14 PROCEDURE — 74011000250 HC RX REV CODE- 250: Performed by: SURGERY

## 2021-10-14 PROCEDURE — 74011000636 HC RX REV CODE- 636: Performed by: EMERGENCY MEDICINE

## 2021-10-14 PROCEDURE — 99284 EMERGENCY DEPT VISIT MOD MDM: CPT

## 2021-10-14 PROCEDURE — 80053 COMPREHEN METABOLIC PANEL: CPT

## 2021-10-14 PROCEDURE — 74011250636 HC RX REV CODE- 250/636: Performed by: EMERGENCY MEDICINE

## 2021-10-14 PROCEDURE — 74011000258 HC RX REV CODE- 258: Performed by: EMERGENCY MEDICINE

## 2021-10-14 PROCEDURE — 74011250636 HC RX REV CODE- 250/636: Performed by: SURGERY

## 2021-10-14 PROCEDURE — 85025 COMPLETE CBC W/AUTO DIFF WBC: CPT

## 2021-10-14 PROCEDURE — 81003 URINALYSIS AUTO W/O SCOPE: CPT

## 2021-10-14 RX ORDER — ONDANSETRON 2 MG/ML
4 INJECTION INTRAMUSCULAR; INTRAVENOUS
Status: COMPLETED | OUTPATIENT
Start: 2021-10-14 | End: 2021-10-14

## 2021-10-14 RX ORDER — SODIUM CHLORIDE 0.9 % (FLUSH) 0.9 %
5-10 SYRINGE (ML) INJECTION EVERY 8 HOURS
Status: DISCONTINUED | OUTPATIENT
Start: 2021-10-14 | End: 2021-10-14

## 2021-10-14 RX ORDER — MORPHINE SULFATE 2 MG/ML
2 INJECTION, SOLUTION INTRAMUSCULAR; INTRAVENOUS
Status: DISCONTINUED | OUTPATIENT
Start: 2021-10-14 | End: 2021-10-22 | Stop reason: HOSPADM

## 2021-10-14 RX ORDER — SODIUM CHLORIDE 0.9 % (FLUSH) 0.9 %
5-40 SYRINGE (ML) INJECTION EVERY 8 HOURS
Status: DISCONTINUED | OUTPATIENT
Start: 2021-10-14 | End: 2021-10-14

## 2021-10-14 RX ORDER — CIPROFLOXACIN 2 MG/ML
400 INJECTION, SOLUTION INTRAVENOUS EVERY 12 HOURS
Status: DISCONTINUED | OUTPATIENT
Start: 2021-10-14 | End: 2021-10-22 | Stop reason: HOSPADM

## 2021-10-14 RX ORDER — ACETAMINOPHEN 650 MG/1
650 SUPPOSITORY RECTAL
Status: DISCONTINUED | OUTPATIENT
Start: 2021-10-14 | End: 2021-10-14

## 2021-10-14 RX ORDER — POLYETHYLENE GLYCOL 3350 17 G/17G
17 POWDER, FOR SOLUTION ORAL DAILY PRN
Status: DISCONTINUED | OUTPATIENT
Start: 2021-10-14 | End: 2021-10-14

## 2021-10-14 RX ORDER — SODIUM CHLORIDE 0.9 % (FLUSH) 0.9 %
5-10 SYRINGE (ML) INJECTION AS NEEDED
Status: DISCONTINUED | OUTPATIENT
Start: 2021-10-14 | End: 2021-10-14

## 2021-10-14 RX ORDER — PROMETHAZINE HYDROCHLORIDE 25 MG/1
12.5 TABLET ORAL
Status: DISCONTINUED | OUTPATIENT
Start: 2021-10-14 | End: 2021-10-22 | Stop reason: HOSPADM

## 2021-10-14 RX ORDER — ENOXAPARIN SODIUM 100 MG/ML
40 INJECTION SUBCUTANEOUS EVERY 24 HOURS
Status: DISCONTINUED | OUTPATIENT
Start: 2021-10-14 | End: 2021-10-22 | Stop reason: HOSPADM

## 2021-10-14 RX ORDER — SODIUM CHLORIDE, SODIUM LACTATE, POTASSIUM CHLORIDE, CALCIUM CHLORIDE 600; 310; 30; 20 MG/100ML; MG/100ML; MG/100ML; MG/100ML
125 INJECTION, SOLUTION INTRAVENOUS CONTINUOUS
Status: DISCONTINUED | OUTPATIENT
Start: 2021-10-14 | End: 2021-10-15

## 2021-10-14 RX ORDER — SODIUM CHLORIDE 0.9 % (FLUSH) 0.9 %
5-40 SYRINGE (ML) INJECTION AS NEEDED
Status: DISCONTINUED | OUTPATIENT
Start: 2021-10-14 | End: 2021-10-22 | Stop reason: HOSPADM

## 2021-10-14 RX ORDER — ACETAMINOPHEN 325 MG/1
650 TABLET ORAL
Status: DISCONTINUED | OUTPATIENT
Start: 2021-10-14 | End: 2021-10-22 | Stop reason: HOSPADM

## 2021-10-14 RX ORDER — SODIUM CHLORIDE 0.9 % (FLUSH) 0.9 %
10 SYRINGE (ML) INJECTION
Status: ACTIVE | OUTPATIENT
Start: 2021-10-14 | End: 2021-10-15

## 2021-10-14 RX ORDER — SODIUM CHLORIDE 0.9 % (FLUSH) 0.9 %
10 SYRINGE (ML) INJECTION
Status: CANCELLED | OUTPATIENT
Start: 2021-10-14 | End: 2021-10-14

## 2021-10-14 RX ORDER — METRONIDAZOLE 500 MG/100ML
500 INJECTION, SOLUTION INTRAVENOUS EVERY 8 HOURS
Status: DISCONTINUED | OUTPATIENT
Start: 2021-10-14 | End: 2021-10-22 | Stop reason: HOSPADM

## 2021-10-14 RX ORDER — SODIUM CHLORIDE 0.9 % (FLUSH) 0.9 %
10 SYRINGE (ML) INJECTION
Status: COMPLETED | OUTPATIENT
Start: 2021-10-14 | End: 2021-10-14

## 2021-10-14 RX ORDER — MORPHINE SULFATE 4 MG/ML
4 INJECTION INTRAVENOUS ONCE
Status: COMPLETED | OUTPATIENT
Start: 2021-10-14 | End: 2021-10-14

## 2021-10-14 RX ORDER — ONDANSETRON 2 MG/ML
4 INJECTION INTRAMUSCULAR; INTRAVENOUS
Status: DISCONTINUED | OUTPATIENT
Start: 2021-10-14 | End: 2021-10-22 | Stop reason: HOSPADM

## 2021-10-14 RX ADMIN — SODIUM CHLORIDE, SODIUM LACTATE, POTASSIUM CHLORIDE, AND CALCIUM CHLORIDE 125 ML/HR: 600; 310; 30; 20 INJECTION, SOLUTION INTRAVENOUS at 23:23

## 2021-10-14 RX ADMIN — SODIUM CHLORIDE 1000 ML: 900 INJECTION, SOLUTION INTRAVENOUS at 16:56

## 2021-10-14 RX ADMIN — Medication 10 ML: at 22:00

## 2021-10-14 RX ADMIN — SODIUM CHLORIDE 100 ML: 900 INJECTION, SOLUTION INTRAVENOUS at 16:22

## 2021-10-14 RX ADMIN — IOPAMIDOL 100 ML: 755 INJECTION, SOLUTION INTRAVENOUS at 16:22

## 2021-10-14 RX ADMIN — CIPROFLOXACIN 400 MG: 2 INJECTION, SOLUTION INTRAVENOUS at 18:08

## 2021-10-14 RX ADMIN — Medication 10 ML: at 23:18

## 2021-10-14 RX ADMIN — ONDANSETRON 4 MG: 2 INJECTION INTRAMUSCULAR; INTRAVENOUS at 15:27

## 2021-10-14 RX ADMIN — FAMOTIDINE 20 MG: 10 INJECTION INTRAVENOUS at 23:17

## 2021-10-14 RX ADMIN — SODIUM CHLORIDE 1000 ML: 900 INJECTION, SOLUTION INTRAVENOUS at 15:26

## 2021-10-14 RX ADMIN — MORPHINE SULFATE 4 MG: 4 INJECTION INTRAVENOUS at 15:27

## 2021-10-14 RX ADMIN — METRONIDAZOLE 500 MG: 500 INJECTION, SOLUTION INTRAVENOUS at 23:41

## 2021-10-14 RX ADMIN — ACETAMINOPHEN 650 MG: 325 TABLET, FILM COATED ORAL at 23:17

## 2021-10-14 RX ADMIN — METRONIDAZOLE 500 MG: 500 INJECTION, SOLUTION INTRAVENOUS at 18:08

## 2021-10-14 RX ADMIN — Medication 10 ML: at 16:22

## 2021-10-14 RX ADMIN — ENOXAPARIN SODIUM 40 MG: 40 INJECTION SUBCUTANEOUS at 23:17

## 2021-10-14 NOTE — ED PROVIDER NOTES
81-year-old male with history of diverticulosis, diverticulitis presents with complaint of worsening cramping, sharp left lower quadrant pain with associated nausea, fever, chills, constipation that is progressively worsened since Friday. Patient states that he had virtual visit with nurse practitioner affiliated with [facility] primary care on Tuesday and was started on Augmentin. Patient states that he is taken 4 doses of Augmentin. States that his pain has progressively worsened. Denies diarrhea, melena, hematochezia, cough, shortness of breath, chest pain, dysuria, hematuria, flank pain. The history is provided by the patient. No  was used. Abdominal Pain   This is a new problem. The current episode started more than 2 days ago. The problem occurs constantly. The problem has not changed since onset. The pain is located in the LLQ. The quality of the pain is cramping and sharp. The pain is at a severity of 6/10. The pain is moderate. Associated symptoms include a fever, nausea and constipation. Pertinent negatives include no anorexia, no belching, no diarrhea, no flatus, no hematochezia, no melena, no vomiting, no dysuria, no frequency, no hematuria, no headaches, no arthralgias, no myalgias, no chest pain, no testicular pain and no back pain. The pain is worsened by palpation. The pain is relieved by nothing. His past medical history is significant for diverticulitis.         Past Medical History:   Diagnosis Date    Diverticulosis     H/O seasonal allergies     Unspecified sleep apnea     does not use cpap       Past Surgical History:   Procedure Laterality Date    COLONOSCOPY N/A 12/13/2017    COLONOSCOPY  BMI 31 performed by Milind Dwyer MD at [facility] 3 HX ORTHOPAEDIC Right 2009    rolled bicep    [place] ABDOMEN SURGERY PROC UNLISTED  2008    perc drain diverticular abscess    [place] COLONOSCOPY FLX DX W/COLLJ SPEC WHEN PFRMD  12/13/2017 Family History:   Problem Relation Age of Onset   Tim Grullon Diabetes Mother     COPD Mother     Kidney Disease Mother     Heart Disease Mother     Hypertension Mother     Cancer Father         prostate    Other Father         abdominal aortic aneurysm    Sleep Apnea Father     Diabetes Maternal Grandmother     Diabetes Sister     Malignant Hyperthermia Neg Hx     Pseudocholinesterase Deficiency Neg Hx     Delayed Awakening Neg Hx     Post-op Nausea/Vomiting Neg Hx     Emergence Delirium Neg Hx     Post-op Cognitive Dysfunction Neg Hx        Social History     Socioeconomic History    Marital status:      Spouse name: Not on file    Number of children: Not on file    Years of education: Not on file    Highest education level: Not on file   Occupational History    Not on file   Tobacco Use    Smoking status: Never Smoker    Smokeless tobacco: Never Used   Substance and Sexual Activity    Alcohol use: No    Drug use: No    Sexual activity: Yes     Partners: Female   Other Topics Concern    Not on file   Social History Narrative    Not on file     Social Determinants of Health     Financial Resource Strain:     Difficulty of Paying Living Expenses:    Food Insecurity:     Worried About Running Out of Food in the Last Year:     Ran Out of Food in the Last Year:    Transportation Needs:     Lack of Transportation (Medical):      Lack of Transportation (Non-Medical):    Physical Activity:     Days of Exercise per Week:     Minutes of Exercise per Session:    Stress:     Feeling of Stress :    Social Connections:     Frequency of Communication with Friends and Family:     Frequency of Social Gatherings with Friends and Family:     Attends Christianity Services:     Active Member of Clubs or Organizations:     Attends Club or Organization Meetings:     Marital Status:    Intimate Partner Violence:     Fear of Current or Ex-Partner:     Emotionally Abused:     Physically Abused:     Sexually Abused: ALLERGIES: Darvocet a500 [propoxyphene n-acetaminophen]    Review of Systems   Constitutional: Positive for chills and fever. Negative for diaphoresis and fatigue. HENT: Negative for congestion, rhinorrhea and sore throat. Respiratory: Negative for cough and shortness of breath. Cardiovascular: Negative for chest pain. Gastrointestinal: Positive for abdominal pain, constipation and nausea. Negative for anorexia, blood in stool, diarrhea, flatus, hematochezia, melena and vomiting. Genitourinary: Negative for dysuria, frequency, hematuria and testicular pain. Musculoskeletal: Negative for arthralgias, back pain and myalgias. Skin: Negative for rash. Neurological: Negative for weakness, light-headedness and headaches. Vitals:    10/14/21 1448   BP: (!) 144/100   Pulse: 77   Resp: 16   Temp: 99.6 °F (37.6 °C)   SpO2: 97%            Physical Exam  Vitals and nursing note reviewed. Constitutional:       Appearance: He is well-developed. HENT:      Head: Normocephalic. Mouth/Throat:      Mouth: Mucous membranes are moist.   Eyes:      Extraocular Movements: Extraocular movements intact. Cardiovascular:      Rate and Rhythm: Normal rate and regular rhythm. Heart sounds: Normal heart sounds. Pulmonary:      Effort: Pulmonary effort is normal.      Breath sounds: Normal breath sounds. Abdominal:      General: Abdomen is flat. Bowel sounds are normal.      Palpations: Abdomen is soft. Tenderness: There is abdominal tenderness in the left lower quadrant. There is no right CVA tenderness, left CVA tenderness or rebound. Negative signs include Sloan's sign. Skin:     General: Skin is warm. Findings: No rash. Neurological:      General: No focal deficit present. Mental Status: He is alert and oriented to person, place, and time. Motor: No weakness.           MDM  Number of Diagnoses or Management Options  Abdominal pain, LLQ (left lower quadrant): new and requires workup  Abscess of sigmoid colon due to diverticulitis  Sigmoid diverticulitis  Diagnosis management comments: DDx-diverticulitis, intra-abdominal abscess, UTI, pyelonephritis, ureterolithiasis, colitis, gastroenteritis, etc.  WBC 12.6. Given IVFs, Morphine, Zofran. CT w/ chronic inflammation proximal sigmoid colon with intramural abscess. Patient is been on 48 hours plus of antibiotic therapy outpatient with no improvement in symptoms. Cipro + Flagyl for coverage. General surgery consulted. Anshul Michael NP states that Dr. Job Barnes will follow and for hospitalist admit. Hospitalist consulted for admission         Amount and/or Complexity of Data Reviewed  Clinical lab tests: ordered and reviewed  Tests in the radiology section of CPT®: ordered and reviewed  Tests in the medicine section of CPT®: ordered and reviewed  Review and summarize past medical records: yes  Independent visualization of images, tracings, or specimens: yes    Risk of Complications, Morbidity, and/or Mortality  Presenting problems: moderate  Diagnostic procedures: moderate  Management options: moderate    Patient Progress  Patient progress: stable    ED Course as of Oct 14 1653   Thu Oct 14, 2021   1609 WBC(!): 12.6 [DF]   1641 CT abd/pelvis IMPRESSION: Colonic inflammation proximal sigmoid colon with probable intramural abscess involving the wall of the sigmoid colon. This measures approximately 1 cm in diameter. No extracolonic abscess or free intraperitoneal air. Small adjacent reactive lymph nodes are present.     [DF]      ED Course User Index  [DF] Cuca Chisholm MD       Procedures        Results Include:    Recent Results (from the past 24 hour(s))   CBC WITH AUTOMATED DIFF    Collection Time: 10/14/21  3:02 PM   Result Value Ref Range    WBC 12.6 (H) 4.3 - 11.1 K/uL    RBC 4.71 4.23 - 5.6 M/uL    HGB 14.3 13.6 - 17.2 g/dL    HCT 42.6 41.1 - 50.3 %    MCV 90.4 79.6 - 97.8 FL    MCH 30.4 26.1 - 32.9 PG    MCHC 33.6 31.4 - 35.0 g/dL    RDW 12.3 11.9 - 14.6 %    PLATELET 234 509 - 325 K/uL    MPV 9.2 (L) 9.4 - 12.3 FL    ABSOLUTE NRBC 0.00 0.0 - 0.2 K/uL    DF AUTOMATED      NEUTROPHILS 80 (H) 43 - 78 %    LYMPHOCYTES 12 (L) 13 - 44 %    MONOCYTES 7 4.0 - 12.0 %    EOSINOPHILS 1 0.5 - 7.8 %    BASOPHILS 0 0.0 - 2.0 %    IMMATURE GRANULOCYTES 0 0.0 - 5.0 %    ABS. NEUTROPHILS 10.1 (H) 1.7 - 8.2 K/UL    ABS. LYMPHOCYTES 1.5 0.5 - 4.6 K/UL    ABS. MONOCYTES 0.9 0.1 - 1.3 K/UL    ABS. EOSINOPHILS 0.1 0.0 - 0.8 K/UL    ABS. BASOPHILS 0.0 0.0 - 0.2 K/UL    ABS. IMM. GRANS. 0.0 0.0 - 0.5 K/UL   METABOLIC PANEL, COMPREHENSIVE    Collection Time: 10/14/21  3:02 PM   Result Value Ref Range    Sodium 139 136 - 145 mmol/L    Potassium 3.5 3.5 - 5.1 mmol/L    Chloride 103 98 - 107 mmol/L    CO2 28 21 - 32 mmol/L    Anion gap 8 7 - 16 mmol/L    Glucose 147 (H) 65 - 100 mg/dL    BUN 14 6 - 23 MG/DL    Creatinine 0.98 0.8 - 1.5 MG/DL    GFR est AA >60 >60 ml/min/1.73m2    GFR est non-AA >60 >60 ml/min/1.73m2    Calcium 9.6 8.3 - 10.4 MG/DL    Bilirubin, total 0.6 0.2 - 1.1 MG/DL    ALT (SGPT) 32 12 - 65 U/L    AST (SGOT) 12 (L) 15 - 37 U/L    Alk. phosphatase 77 50 - 136 U/L    Protein, total 9.1 (H) 6.3 - 8.2 g/dL    Albumin 3.8 3.5 - 5.0 g/dL    Globulin 5.3 (H) 2.3 - 3.5 g/dL    A-G Ratio 0.7 (L) 1.2 - 3.5     LIPASE    Collection Time: 10/14/21  3:02 PM   Result Value Ref Range    Lipase 56 (L) 73 - 393 U/L            Johny Driver MD; 10/14/2021 @3:18 PM Voice dictation software was used during the making of this note. This software is not perfect and grammatical and other typographical errors may be present.   This note has not been proofread for errors.  ===================================================================

## 2021-10-14 NOTE — H&P
[facility] Hospitalist   History and Physical       Name:  Rick Najera Age: 50 y.o. Sex: male   :  1965    MRN:  [id number]   PCP:  Blaire Dahl MD      Admit Date:  10/14/2021  3:03 PM   Presenting Complaint: Abdominal Pain      Reason for Admission:  Sigmoid diverticulitis [K57.32]  Abscess of sigmoid colon due to diverticulitis [K57.20]    Assessment & Plan:   Sigmoid diverticulitis with abscess  Symptom onset on 10/8. Started on Augmentin on 10/12 with worsening symptoms. CT scan reviewed. It showed colonic inflammation proximal sigmoid colon with probable intramural abscess about 1 cm in diameter. Failed outpt therapy with augmentin  -start ciprofloxacin and flagy  -Follow-up blood cultures  -clear liquid diet  -antiemetics    Obesity :Body mass index is 32.89 kg/m². Disposition: Inpatient admission, 2+ midnights  Diet: clear liquid  VTE ppx: lovenox  CODE STATUS: FULL      I have reviewed and ordered clinical lab tests and independently visualized images, tracing. I have reviewed, updated, and verified this note's content and spent 36 minutes of my 42 minutes visit reviewing outpatient charts, performing counseling and coordination of care regarding medical management    History of Presenting Illness:     Rick Najera is a 50 y.o. male with medical history of diverticulosis with worsening left lower quadrant abdominal pain, nausea, vomiting and fever. Patient reports that his symptoms started on 10/8. Patient saw his PCP via virtual appointment on 10/12 and was started on Augmentin. Patient reported taking 4 days of Augmentin without improvement in the symptoms. Pain is located left lower quadrant nonradiating, 7 out of 10 in intensity. Denies any constipation or diarrhea or bloody bowel movement but does report having mucus in his stool. Reports nausea and vomiting but so far been able to tolerate some p.o. diet. Last episode of vomiting was early this morning. Reports 100°F overnight at home. In the ED, patient is noted to have low-grade fever 99.6 °F, slightly hypertensive to 144/100. Saturating well on room air. Laboratory work up is remarkable for 12.6. CT A/P with colonic inflammation proximal sigmoid colon with probable intramural abscess about 1 cm in diameter. Review of Systems: 10 systems reviewed and negative except as noted in HPI. Past Medical History:   Diagnosis Date    Diverticulosis     H/O seasonal allergies     Unspecified sleep apnea     does not use cpap       Past Surgical History:   Procedure Laterality Date    COLONOSCOPY N/A 12/13/2017    COLONOSCOPY  BMI 31 performed by Romeo Andersen MD at [facility] 3 HX ORTHOPAEDIC Right 2009    rolled bicep    [place] ABDOMEN SURGERY PROC UNLISTED  2008    perc drain diverticular abscess    [place] COLONOSCOPY FLX DX W/COLLJ SPEC WHEN PFRMD  12/13/2017            Family History : reviewed.   Family History   Problem Relation Age of Onset    Diabetes Mother     COPD Mother     Kidney Disease Mother     Heart Disease Mother     Hypertension Mother     Cancer Father         prostate    Other Father         abdominal aortic aneurysm    Sleep Apnea Father     Diabetes Maternal Grandmother     Diabetes Sister     Malignant Hyperthermia Neg Hx     Pseudocholinesterase Deficiency Neg Hx     Delayed Awakening Neg Hx     Post-op Nausea/Vomiting Neg Hx     Emergence Delirium Neg Hx     Post-op Cognitive Dysfunction Neg Hx         Social History     Tobacco Use    Smoking status: Never Smoker    Smokeless tobacco: Never Used   Substance Use Topics    Alcohol use: No       Allergies   Allergen Reactions    Darvocet A500 [Propoxyphene N-Acetaminophen] Nausea and Vomiting       Immunization History   Administered Date(s) Administered    COVID-19, PFIZER, MRNA, LNP-S, PF, 30MCG/0.3ML DOSE 03/16/2021, 04/06/2021         PTA Medications:  Current Outpatient Medications   Medication Instructions    amoxicillin-clavulanate (AUGMENTIN) 875-125 mg per tablet 1 Tablet, Oral, EVERY 12 HOURS    loratadine (CLARITIN) 10 mg, DAILY       Objective:     Patient Vitals for the past 24 hrs:   Temp Pulse Resp BP SpO2   10/14/21 1448 99.6 °F (37.6 °C) 77 16 (!) 144/100 97 %       Oxygen Therapy  O2 Sat (%): 97 % (10/14/21 1448)  O2 Device: None (Room air) (10/14/21 1448)    Body mass index is 32.89 kg/m². Physical Exam:    General:     alert, awake, no acute distress. HENT:   normocephalic, atraumatic. Eyes:   anicteric sclerae, moist conjunctiva, EOMI  Neck:    supple, non-tender. Trachea midline. Lungs:   CTAB, no wheezing  Cardiac:   RRR, Normal S1 and S2. Abdomen:   Soft, non distended, TTP to LLQ, +BS, no guarding/rebound  Extremities:   No edema , pedal pulses present, no digital cyanosis  Skin:   Warn, dry, normnal turgor and texture  Neuro:  AAOx3. No gross focal neurological deficit  Psychiatric:  No anxiety, calm, cooperative    Data Reviewed:  I have reviewed ordered lab tests and independently visualized imaging below:    Recent Results (from the past 24 hour(s))   CBC WITH AUTOMATED DIFF    Collection Time: 10/14/21  3:02 PM   Result Value Ref Range    WBC 12.6 (H) 4.3 - 11.1 K/uL    RBC 4.71 4.23 - 5.6 M/uL    HGB 14.3 13.6 - 17.2 g/dL    HCT 42.6 41.1 - 50.3 %    MCV 90.4 79.6 - 97.8 FL    MCH 30.4 26.1 - 32.9 PG    MCHC 33.6 31.4 - 35.0 g/dL    RDW 12.3 11.9 - 14.6 %    PLATELET 968 528 - 066 K/uL    MPV 9.2 (L) 9.4 - 12.3 FL    ABSOLUTE NRBC 0.00 0.0 - 0.2 K/uL    DF AUTOMATED      NEUTROPHILS 80 (H) 43 - 78 %    LYMPHOCYTES 12 (L) 13 - 44 %    MONOCYTES 7 4.0 - 12.0 %    EOSINOPHILS 1 0.5 - 7.8 %    BASOPHILS 0 0.0 - 2.0 %    IMMATURE GRANULOCYTES 0 0.0 - 5.0 %    ABS. NEUTROPHILS 10.1 (H) 1.7 - 8.2 K/UL    ABS. LYMPHOCYTES 1.5 0.5 - 4.6 K/UL    ABS. MONOCYTES 0.9 0.1 - 1.3 K/UL    ABS. EOSINOPHILS 0.1 0.0 - 0.8 K/UL    ABS.  BASOPHILS 0.0 0.0 - 0.2 K/UL ABS. IMM. GRANS. 0.0 0.0 - 0.5 K/UL   METABOLIC PANEL, COMPREHENSIVE    Collection Time: 10/14/21  3:02 PM   Result Value Ref Range    Sodium 139 136 - 145 mmol/L    Potassium 3.5 3.5 - 5.1 mmol/L    Chloride 103 98 - 107 mmol/L    CO2 28 21 - 32 mmol/L    Anion gap 8 7 - 16 mmol/L    Glucose 147 (H) 65 - 100 mg/dL    BUN 14 6 - 23 MG/DL    Creatinine 0.98 0.8 - 1.5 MG/DL    GFR est AA >60 >60 ml/min/1.73m2    GFR est non-AA >60 >60 ml/min/1.73m2    Calcium 9.6 8.3 - 10.4 MG/DL    Bilirubin, total 0.6 0.2 - 1.1 MG/DL    ALT (SGPT) 32 12 - 65 U/L    AST (SGOT) 12 (L) 15 - 37 U/L    Alk. phosphatase 77 50 - 136 U/L    Protein, total 9.1 (H) 6.3 - 8.2 g/dL    Albumin 3.8 3.5 - 5.0 g/dL    Globulin 5.3 (H) 2.3 - 3.5 g/dL    A-G Ratio 0.7 (L) 1.2 - 3.5     LIPASE    Collection Time: 10/14/21  3:02 PM   Result Value Ref Range    Lipase 56 (L) 73 - 393 U/L       All Micro Results     Procedure Component Value Units Date/Time    CULTURE, BLOOD [[id number]] Collected: 10/14/21 1709    Order Status: Completed Specimen: Blood Updated: 10/14/21 1716    CULTURE, BLOOD [[id number]]     Order Status: Sent Specimen: Blood           Other Studies:  CT ABD PELV W CONT    Result Date: 10/14/2021  CT OF THE ABDOMEN AND PELVIS INDICATION: Left lower quadrant abdominal pain currently on antibiotics with persistent pain. Evaluate for corticated diverticulitis. Multiple axial images were obtained through the abdomen and pelvis. Oral contrast was used for bowel opacification. 100mL of Isovue 370 intravenous contrast was used for better evaluation of solid organs and vascular structures. Radiation dose reduction techniques were used for this study. All CT scans performed at this facility use one or all of the following: Automated exposure control, adjustment of the mA and/or kVp according to patient's size, iterative reconstruction. COMPARISON: CT abdomen and pelvis 6/7/2017.  FINDINGS: LOWER CHEST: Normal. HEPATOBILIARY: Subtle low-attenuation liver lesions are present most likely cysts and too small to characterize by CT. These may be slightly larger than on the prior exam from 2017. No new liver lesions are appreciated. No calcified gallstones. PANCREAS: Normal. SPLEEN: Normal. ADRENAL GLANDS: Normal. KIDNEYS/BLADDER: Kidneys and bladder are unremarkable. No urinary tract calculi or hydronephrosis. BOWEL: No evidence of bowel obstruction. Appendix is normal. There is marked inflammation and wall thickening in the region of the proximal sigmoid colon with surrounding mesenteric inflammation. There is likely a small intramural fluid collection involving the wall of the colon. No extra colonic fluid collection or free air is evident. Several small probable reactive mesenteric nodes are present in this area. LYMPH NODES: Small left lower quadrant mesenteric lymph nodes in the area of the colonic inflammation and cortical thickening. Small retroperitoneal nodes are also present but are not enlarged by CT criteria and appear stable. No enlarged pelvic lymph nodes. VASCULATURE: Unremarkable. PELVIC ORGANS: Prostate gland and rectum are unremarkable. No free pelvic fluid. MUSCULOSKELETAL: Normal.     Colonic inflammation proximal sigmoid colon with probable intramural abscess involving the wall of the sigmoid colon. This measures approximately 1 cm in diameter. No extracolonic abscess or free intraperitoneal air. Small adjacent reactive lymph nodes are present.          Medications:  Medications Administered     iopamidoL (ISOVUE-370) 76 % injection 100 mL     Admin Date  10/14/2021 Action  Given Dose  100 mL Route  IntraVENous Administered By  Sonia COLLIER          morphine injection 4 mg     Admin Date  10/14/2021 Action  Given Dose  4 mg Route  IntraVENous Administered By  Liseth Sánchez RN          ondansetron [facility]) injection 4 mg     Admin Date  10/14/2021 Action  Given Dose  4 mg Route  IntraVENous Administered By  Liseth Sánchez RN          saline peripheral flush soln 10 mL     Admin Date  10/14/2021 Action  Given Dose  10 mL Route  InterCATHeter Administered By  Pili COLLIER          sodium chloride 0.9 % bolus infusion 1,000 mL     Admin Date  10/14/2021 Action  New Bag Dose  1,000 mL Route  IntraVENous Administered By  Ryan Bhagat RN          sodium chloride 0.9 % bolus infusion 100 mL     Admin Date  10/14/2021 Action  New Bag Dose  100 mL Route  IntraVENous Administered By  Jevon Sanon                Problem List:     Hospital Problems as of 10/14/2021 Date Reviewed: 10/12/2021        Codes Class Noted - Resolved POA    Sigmoid diverticulitis ICD-10-CM: K57.32  ICD-9-CM: 562.11  10/14/2021 - Present Yes        Abscess of sigmoid colon due to diverticulitis ICD-10-CM: K57.20  ICD-9-CM: 562.11, 569.5  10/14/2021 - Present Yes        Obesity (BMI 30.0-34.9) ICD-10-CM: E66.9  ICD-9-CM: 278.00  10/5/2020 - Present Yes                 Part of this note was written by using a voice dictation software and the note has been proof read but may still contain some grammatical/other typographical errors.        Callie Dos Santos MD  [facility] Hospitalist Service  October 14, 2021    5:08 PM

## 2021-10-15 PROBLEM — R51.9 HEADACHE: Status: ACTIVE | Noted: 2021-10-15

## 2021-10-15 LAB
ANION GAP SERPL CALC-SCNC: 4 MMOL/L (ref 7–16)
BUN SERPL-MCNC: 10 MG/DL (ref 6–23)
CALCIUM SERPL-MCNC: 8.5 MG/DL (ref 8.3–10.4)
CHLORIDE SERPL-SCNC: 107 MMOL/L (ref 98–107)
CO2 SERPL-SCNC: 31 MMOL/L (ref 21–32)
CREAT SERPL-MCNC: 0.89 MG/DL (ref 0.8–1.5)
ERYTHROCYTE [DISTWIDTH] IN BLOOD BY AUTOMATED COUNT: 12.4 % (ref 11.9–14.6)
GLUCOSE SERPL-MCNC: 101 MG/DL (ref 65–100)
HCT VFR BLD AUTO: 35.4 % (ref 41.1–50.3)
HGB BLD-MCNC: 11.5 G/DL (ref 13.6–17.2)
MCH RBC QN AUTO: 29.9 PG (ref 26.1–32.9)
MCHC RBC AUTO-ENTMCNC: 32.5 G/DL (ref 31.4–35)
MCV RBC AUTO: 92.2 FL (ref 79.6–97.8)
NRBC # BLD: 0 K/UL (ref 0–0.2)
PLATELET # BLD AUTO: 241 K/UL (ref 150–450)
PMV BLD AUTO: 9.2 FL (ref 9.4–12.3)
POTASSIUM SERPL-SCNC: 3.9 MMOL/L (ref 3.5–5.1)
RBC # BLD AUTO: 3.84 M/UL (ref 4.23–5.6)
SODIUM SERPL-SCNC: 142 MMOL/L (ref 136–145)
WBC # BLD AUTO: 10.4 K/UL (ref 4.3–11.1)

## 2021-10-15 PROCEDURE — 85027 COMPLETE CBC AUTOMATED: CPT

## 2021-10-15 PROCEDURE — 74011250636 HC RX REV CODE- 250/636: Performed by: EMERGENCY MEDICINE

## 2021-10-15 PROCEDURE — 74011250636 HC RX REV CODE- 250/636: Performed by: INTERNAL MEDICINE

## 2021-10-15 PROCEDURE — 36415 COLL VENOUS BLD VENIPUNCTURE: CPT

## 2021-10-15 PROCEDURE — 99232 SBSQ HOSP IP/OBS MODERATE 35: CPT | Performed by: SURGERY

## 2021-10-15 PROCEDURE — 74011000250 HC RX REV CODE- 250: Performed by: SURGERY

## 2021-10-15 PROCEDURE — 80048 BASIC METABOLIC PNL TOTAL CA: CPT

## 2021-10-15 PROCEDURE — 2709999900 HC NON-CHARGEABLE SUPPLY

## 2021-10-15 PROCEDURE — 94760 N-INVAS EAR/PLS OXIMETRY 1: CPT

## 2021-10-15 PROCEDURE — 74011250636 HC RX REV CODE- 250/636: Performed by: SURGERY

## 2021-10-15 PROCEDURE — 74011250637 HC RX REV CODE- 250/637: Performed by: INTERNAL MEDICINE

## 2021-10-15 PROCEDURE — 65270000029 HC RM PRIVATE

## 2021-10-15 PROCEDURE — 74011250637 HC RX REV CODE- 250/637: Performed by: SURGERY

## 2021-10-15 RX ORDER — ACETAMINOPHEN 500 MG
1000 TABLET ORAL
Status: COMPLETED | OUTPATIENT
Start: 2021-10-15 | End: 2021-10-15

## 2021-10-15 RX ORDER — DEXTROSE, SODIUM CHLORIDE, AND POTASSIUM CHLORIDE 5; .45; .15 G/100ML; G/100ML; G/100ML
100 INJECTION INTRAVENOUS CONTINUOUS
Status: DISPENSED | OUTPATIENT
Start: 2021-10-15 | End: 2021-10-21

## 2021-10-15 RX ORDER — IBUPROFEN 600 MG/1
600 TABLET ORAL ONCE
Status: COMPLETED | OUTPATIENT
Start: 2021-10-15 | End: 2021-10-15

## 2021-10-15 RX ORDER — IBUPROFEN 600 MG/1
600 TABLET ORAL
Status: COMPLETED | OUTPATIENT
Start: 2021-10-15 | End: 2021-10-15

## 2021-10-15 RX ADMIN — METRONIDAZOLE 500 MG: 500 INJECTION, SOLUTION INTRAVENOUS at 09:09

## 2021-10-15 RX ADMIN — ENOXAPARIN SODIUM 40 MG: 40 INJECTION SUBCUTANEOUS at 20:08

## 2021-10-15 RX ADMIN — ACETAMINOPHEN 650 MG: 325 TABLET, FILM COATED ORAL at 05:13

## 2021-10-15 RX ADMIN — FAMOTIDINE 20 MG: 10 INJECTION INTRAVENOUS at 20:07

## 2021-10-15 RX ADMIN — POTASSIUM CHLORIDE, DEXTROSE MONOHYDRATE AND SODIUM CHLORIDE 100 ML/HR: 150; 5; 450 INJECTION, SOLUTION INTRAVENOUS at 23:02

## 2021-10-15 RX ADMIN — FAMOTIDINE 20 MG: 10 INJECTION INTRAVENOUS at 09:10

## 2021-10-15 RX ADMIN — CIPROFLOXACIN 400 MG: 2 INJECTION, SOLUTION INTRAVENOUS at 05:13

## 2021-10-15 RX ADMIN — ONDANSETRON 4 MG: 2 INJECTION INTRAMUSCULAR; INTRAVENOUS at 09:42

## 2021-10-15 RX ADMIN — ONDANSETRON 4 MG: 2 INJECTION INTRAMUSCULAR; INTRAVENOUS at 20:08

## 2021-10-15 RX ADMIN — POTASSIUM CHLORIDE, DEXTROSE MONOHYDRATE AND SODIUM CHLORIDE 100 ML/HR: 150; 5; 450 INJECTION, SOLUTION INTRAVENOUS at 14:01

## 2021-10-15 RX ADMIN — MORPHINE SULFATE 2 MG: 2 INJECTION, SOLUTION INTRAMUSCULAR; INTRAVENOUS at 12:10

## 2021-10-15 RX ADMIN — Medication 10 ML: at 12:10

## 2021-10-15 RX ADMIN — ACETAMINOPHEN 650 MG: 325 TABLET, FILM COATED ORAL at 18:32

## 2021-10-15 RX ADMIN — ACETAMINOPHEN 1000 MG: 500 TABLET, FILM COATED ORAL at 14:01

## 2021-10-15 RX ADMIN — IBUPROFEN 600 MG: 600 TABLET, FILM COATED ORAL at 18:32

## 2021-10-15 RX ADMIN — CIPROFLOXACIN 400 MG: 2 INJECTION, SOLUTION INTRAVENOUS at 17:45

## 2021-10-15 RX ADMIN — PROMETHAZINE HYDROCHLORIDE 12.5 MG: 25 TABLET ORAL at 12:11

## 2021-10-15 RX ADMIN — IBUPROFEN 600 MG: 600 TABLET ORAL at 14:01

## 2021-10-15 RX ADMIN — METRONIDAZOLE 500 MG: 500 INJECTION, SOLUTION INTRAVENOUS at 23:02

## 2021-10-15 RX ADMIN — MORPHINE SULFATE 2 MG: 2 INJECTION, SOLUTION INTRAMUSCULAR; INTRAVENOUS at 09:21

## 2021-10-15 RX ADMIN — METRONIDAZOLE 500 MG: 500 INJECTION, SOLUTION INTRAVENOUS at 17:45

## 2021-10-15 NOTE — PROGRESS NOTES
10/15/21 0000   Dual Skin Pressure Injury Assessment   Dual Skin Pressure Injury Assessment WDL   Second Care Provider (Based on Facility Policy) Laine Lucas   Skin Integumentary   Skin Integumentary (WDL) WDL

## 2021-10-15 NOTE — PROGRESS NOTES
H&P/Consult Note/Progress Note/Office Note:   Gregory Conley MRN: [id number]  :1965  Age:55 y.o.    HPI: Gregory Conley is a 50 y.o. male with h/o diverticulitis who came to the ER on 10/14/21   complaining worsening severe LLQ pain that began on 10/8/2021. He reported he was treated with Augmentin  for suspected diverticulitis. Despite the Augmentin his pain worsened. Nothing in particular made his symptoms better. He reported associated nausea and fever. He had a percutaneous drain placed for diverticulitis in . He is s/p [facility] with mesh in   He is s/p colonoscopy on 2017 by Dr. Hamida Liriano. Surgery was consulted to see the patient by Dr. Darcie Mccartney. 10/14/21 CT abd/pelvis with oral and IV contrast  Hx: LLQ abd pain currently on antibiotics with persistent pain.      LOWER CHEST: Normal.     HEPATOBILIARY: Subtle low-attenuation liver lesions are present most likely cysts and too small to characterize by CT. These may be slightly larger than on the prior exam from 2017. No new liver lesions are appreciated. No calcified gallstones.     PANCREAS: Normal.  SPLEEN: Normal.  ADRENAL GLANDS: Normal.  KIDNEYS/BLADDER: Kidneys and bladder are unremarkable. No urinary tract calculi or hydronephrosis.     BOWEL: No evidence of bowel obstruction. Appendix is normal.   There is marked inflammation and wall thickening in the region of the proximal sigmoid colon with surrounding mesenteric inflammation. There is likely a small intramural fluid collection involving the wall of the colon. No extra colonic fluid collection or free air is evident. Several small probable reactive mesenteric nodes are present in this area.     LYMPH NODES: Small left lower quadrant mesenteric lymph nodes in the area of the colonic inflammation and cortical thickening. Small retroperitoneal nodes are also present but are not enlarged by CT criteria and appear stable.  No enlarged pelvic lymph nodes.     VASCULATURE: Unremarkable. PELVIC ORGANS: Prostate gland and rectum are unremarkable. No free pelvic fluid. MUSCULOSKELETAL: Normal.     IMPRESSION  Colonic inflammation proximal sigmoid colon with probable intramural abscess involving the wall of the sigmoid colon. This measures approximately 1 cm in diameter. No extracolonic abscess or free intraperitoneal air.    Small adjacent reactive lymph nodes are present.         Additional hx:  10/15/21 c/o headache; AF/VSS; LLQ pain; WBC 12.6--->10.4k;  IV Cipro/Flagyl/ Bowel rest              Past Medical History:   Diagnosis Date    Diverticulosis     H/O seasonal allergies     Unspecified sleep apnea     does not use cpap     Past Surgical History:   Procedure Laterality Date    COLONOSCOPY N/A 12/13/2017    COLONOSCOPY  BMI 31 performed by Misael Mir MD at [facility] 3 HX ORTHOPAEDIC Right 2009    rolled bicep    NH ABDOMEN SURGERY PROC UNLISTED  2008    perc drain diverticular abscess    NH COLONOSCOPY FLX DX W/COLLJ SPEC WHEN PFRMD  12/13/2017          Current Facility-Administered Medications   Medication Dose Route Frequency    ciprofloxacin (CIPRO) 400 mg in D5W IVPB (premix)  400 mg IntraVENous Q12H    metroNIDAZOLE (FLAGYL) IVPB premix 500 mg  500 mg IntraVENous Q8H    sodium chloride (NS) flush 5-40 mL  5-40 mL IntraVENous PRN    acetaminophen (TYLENOL) tablet 650 mg  650 mg Oral Q6H PRN    promethazine (PHENERGAN) tablet 12.5 mg  12.5 mg Oral Q6H PRN    Or    ondansetron (ZOFRAN) injection 4 mg  4 mg IntraVENous Q6H PRN    enoxaparin (LOVENOX) injection 40 mg  40 mg SubCUTAneous Q24H    lactated Ringers infusion  125 mL/hr IntraVENous CONTINUOUS    famotidine (PF) (PEPCID) 20 mg in 0.9% sodium chloride 10 mL injection  20 mg IntraVENous Q12H    morphine injection 2 mg  2 mg IntraVENous Q1H PRN     Darvocet a500 [propoxyphene n-acetaminophen]  Social History     Socioeconomic History    Marital status:      Spouse name: Not on file    Number of children: Not on file    Years of education: Not on file    Highest education level: Not on file   Tobacco Use    Smoking status: Never Smoker    Smokeless tobacco: Never Used   Substance and Sexual Activity    Alcohol use: No    Drug use: No    Sexual activity: Yes     Partners: Female     Social Determinants of Health     Financial Resource Strain:     Difficulty of Paying Living Expenses:    Food Insecurity:     Worried About Running Out of Food in the Last Year:     [address] in the Last Year:    Transportation Needs:     Lack of Transportation (Medical):  Lack of Transportation (Non-Medical):    Physical Activity:     Days of Exercise per Week:     Minutes of Exercise per Session:    Stress:     Feeling of Stress :    Social Connections:     Frequency of Communication with Friends and Family:     Frequency of Social Gatherings with Friends and Family:     Attends [employer] Services:     Active Member of Clubs or Organizations:     Attends Club or Organization Meetings:     Marital Status:      Social History     Tobacco Use   Smoking Status Never Smoker   Smokeless Tobacco Never Used     Family History   Problem Relation Age of Onset    Diabetes Mother    Tim Salmalathi COPD Mother     Kidney Disease Mother     Heart Disease Mother     Hypertension Mother     Cancer Father         prostate    Other Father         abdominal aortic aneurysm    Sleep Apnea Father     Diabetes Maternal Grandmother     Diabetes Sister     Malignant Hyperthermia Neg Hx     Pseudocholinesterase Deficiency Neg Hx     Delayed Awakening Neg Hx     Post-op Nausea/Vomiting Neg Hx     Emergence Delirium Neg Hx     Post-op Cognitive Dysfunction Neg Hx      ROS: The patient has no difficulty with chest pain or shortness of breath. No fever or chills. Comprehensive review of systems was otherwise unremarkable except as noted above.     Physical Exam: Visit Vitals  BP (!) 132/91 (BP 1 Location: Left upper arm, BP Patient Position: At rest)   Pulse 79   Temp 98.5 °F (36.9 °C)   Resp 18   Ht 5' 7\" (1.702 m)   Wt 210 lb (95.3 kg)   SpO2 93%   BMI 32.89 kg/m²     Vitals:    10/14/21 2328 10/15/21 0450 10/15/21 0758 10/15/21 1136   BP: (!) 141/82 130/80 (!) 144/84 (!) 132/91   Pulse: 78 85 69 79   Resp: 18 18 18 18   Temp: 98.5 °F (36.9 °C) 98.5 °F (36.9 °C) 98.7 °F (37.1 °C) 98.5 °F (36.9 °C)   SpO2: 98% 97% 94% 93%   Weight:       Height:         10/15 0701 - 10/15 1900  In: -   Out: 500 [Urine:500]  10/13 1901 - 10/15 0700  In: 927.1 [I.V.:927.1]  Out: 750 [Urine:750]    Constitutional: Alert, oriented, cooperative patient in no acute distress; appears stated age    Eyes:Sclera are clear. EOMs intact  ENMT: no external lesions gross hearing normal; no obvious neck masses, no ear or lip lesions, nares normal  CV: RRR. Normal perfusion  Resp: No JVD. Breathing is  non-labored; no audible wheezing. GI: soft and non-distended; Tender in LLQ     Musculoskeletal: unremarkable with normal function. No embolic signs or cyanosis.    Neuro:  Oriented; moves all 4; no focal deficits  Psychiatric: normal affect and mood, no memory impairment    Recent vitals (if inpt):  Patient Vitals for the past 24 hrs:   BP Temp Pulse Resp SpO2 Height Weight   10/15/21 1136 (!) 132/91 98.5 °F (36.9 °C) 79 18 93 %     10/15/21 0758 (!) 144/84 98.7 °F (37.1 °C) 69 18 94 %     10/15/21 0450 130/80 98.5 °F (36.9 °C) 85 18 97 %     10/14/21 2328 (!) 141/82 98.5 °F (36.9 °C) 78 18 98 %     10/14/21 2153 138/68 98.7 °F (37.1 °C) 81 18 97 %     10/14/21 2059 128/76 99 °F (37.2 °C) 81 18 96 %     10/14/21 1946 139/87 98.9 °F (37.2 °C) 71 18 97 %     10/14/21 1730      5' 7\" (1.702 m) 210 lb (95.3 kg)   10/14/21 1448 (!) 144/100 99.6 °F (37.6 °C) 77 16 97 %         Amount and/or Complexity of Data Reviewed and Analyzed:  I reviewed and analyzed all of the unique labs and radiologic studies that are shown below as well as any that are in the HPI, and any that are in the expanded problem list below  *Each unique test, order, or document contributes to the combination of 2 or combination of 3 in Category 1 below. For this visit I also reviewed old records and prior notes. Recent Labs     10/15/21  0405 10/14/21  1502 10/14/21  1502   WBC 10.4   < > 12.6*   HGB 11.5*   < > 14.3      < > 293      < > 139   K 3.9   < > 3.5      < > 103   CO2 31   < > 28   BUN 10   < > 14   CREA 0.89   < > 0.98   *   < > 147*   TBILI  --   --  0.6   ALT  --   --  32   AP  --   --  77   LPSE  --   --  56*    < > = values in this interval not displayed. Review of most recent CBC  Lab Results   Component Value Date/Time    WBC 10.4 10/15/2021 04:05 AM    HGB 11.5 (L) 10/15/2021 04:05 AM    HCT 35.4 (L) 10/15/2021 04:05 AM    PLATELET 429 98/42/8488 04:05 AM    MCV 92.2 10/15/2021 04:05 AM       Review of most recent BMP  Lab Results   Component Value Date/Time    Sodium 142 10/15/2021 04:05 AM    Potassium 3.9 10/15/2021 04:05 AM    Chloride 107 10/15/2021 04:05 AM    CO2 31 10/15/2021 04:05 AM    Anion gap 4 (L) 10/15/2021 04:05 AM    Glucose 101 (H) 10/15/2021 04:05 AM    BUN 10 10/15/2021 04:05 AM    Creatinine 0.89 10/15/2021 04:05 AM    BUN/Creatinine ratio 15 10/19/2020 07:21 AM    GFR est AA >60 10/15/2021 04:05 AM    GFR est non-AA >60 10/15/2021 04:05 AM    Calcium 8.5 10/15/2021 04:05 AM       Review of most recent LFTs (and lipase if done)  Lab Results   Component Value Date/Time    ALT (SGPT) 32 10/14/2021 03:02 PM    AST (SGOT) 12 (L) 10/14/2021 03:02 PM    Alk.  phosphatase 77 10/14/2021 03:02 PM    Bilirubin, total 0.6 10/14/2021 03:02 PM     Lab Results   Component Value Date/Time    Lipase 56 (L) 10/14/2021 03:02 PM       Lab Results   Component Value Date/Time    INR  05/12/2008 08:55 PM     1.1  Suggested therapeutic INR range:  Venous thrombosis and embolus            2.0-3.0  Prosthetic heart valve                   2.5-3.5    aPTT 30.5  HEPARIN THERAPY RANGE:  48 - 75 SECONDS 05/12/2008 08:55 PM       Review of most recent HgbA1c  No results found for: HBA1C, VAO1LWGI, AXE8QRGX, DII8CEJL    Nutritional assessment screen for wound healing issues:  Lab Results   Component Value Date/Time    Protein, total 9.1 (H) 10/14/2021 03:02 PM    Albumin 3.8 10/14/2021 03:02 PM       @lastcovr@  XR Results (most recent):  Results from Appointment encounter on 03/31/21    XR KNEE RT MIN 4 V    Narrative  AUTOMATIC ADMINISTRATIVE RESULT    The result for this exam can be found in the Progress note in the chart. Impression  :  See Progress note in the chart. CT Results (most recent):  Results from Hospital Encounter encounter on 10/14/21    CT ABD PELV W CONT    Narrative  CT OF THE ABDOMEN AND PELVIS    INDICATION: Left lower quadrant abdominal pain currently on antibiotics with  persistent pain. Evaluate for corticated diverticulitis. Multiple axial images were obtained through the abdomen and pelvis. Oral  contrast was used for bowel opacification. 100mL of Isovue 370 intravenous  contrast was used for better evaluation of solid organs and vascular structures. Radiation dose reduction techniques were used for this study. All CT scans  performed at this facility use one or all of the following: Automated exposure  control, adjustment of the mA and/or kVp according to patient's size, iterative  reconstruction. COMPARISON: CT abdomen and pelvis 6/7/2017. FINDINGS:  LOWER CHEST: Normal.    HEPATOBILIARY: Subtle low-attenuation liver lesions are present most likely  cysts and too small to characterize by CT. These may be slightly larger than on  the prior exam from 2017. No new liver lesions are appreciated. No calcified  gallstones. PANCREAS: Normal.    SPLEEN: Normal.    ADRENAL GLANDS: Normal.    KIDNEYS/BLADDER: Kidneys and bladder are unremarkable. No urinary tract calculi  or hydronephrosis. BOWEL: No evidence of bowel obstruction. Appendix is normal. There is marked  inflammation and wall thickening in the region of the proximal sigmoid colon  with surrounding mesenteric inflammation. There is likely a small intramural  fluid collection involving the wall of the colon. No extra colonic fluid  collection or free air is evident. Several small probable reactive mesenteric  nodes are present in this area. LYMPH NODES: Small left lower quadrant mesenteric lymph nodes in the area of the  colonic inflammation and cortical thickening. Small retroperitoneal nodes are  also present but are not enlarged by CT criteria and appear stable. No enlarged  pelvic lymph nodes. VASCULATURE: Unremarkable. PELVIC ORGANS: Prostate gland and rectum are unremarkable. No free pelvic fluid. MUSCULOSKELETAL: Normal.    Impression  Colonic inflammation proximal sigmoid colon with probable intramural  abscess involving the wall of the sigmoid colon. This measures approximately 1  cm in diameter. No extracolonic abscess or free intraperitoneal air. Small  adjacent reactive lymph nodes are present. US Results (most recent):  No results found for this or any previous visit.         Admission date (for inpatients): 10/14/2021   * No surgery found *  * No surgery found *        ASSESSMENT/PLAN:  Problem List  Date Reviewed: 10/12/2021        Codes Class Noted    * (Principal) Sigmoid diverticulitis ICD-10-CM: C42.21  ICD-9-CM: 562.11  10/14/2021        Abscess of sigmoid colon due to diverticulitis ICD-10-CM: K57.20  ICD-9-CM: 562.11, 569.5  10/14/2021        LLQ pain ICD-10-CM: R10.32  ICD-9-CM: 789.04  10/14/2021        Obesity (BMI 30.0-34.9) ICD-10-CM: E66.9  ICD-9-CM: 278.00  10/5/2020        Transient disorder of initiating or maintaining sleep ICD-10-CM: F51.02  ICD-9-CM: 307.41  10/5/2020        Hyperlipemia ICD-10-CM: E78.5  ICD-9-CM: 272.4  8/6/2014        FRENCH on CPAP ICD-10-CM: G47.33, Z99.89  ICD-9-CM: 327.23, V46.8  8/6/2014        HA (headache) ICD-10-CM: R51.9  ICD-9-CM: 784.0  8/6/2014        FH: prostate cancer ICD-10-CM: Z80.42  ICD-9-CM: V16.42  8/6/2014        FHx: AAA ICD-10-CM: ECC5791  ICD-9-CM: Mikey Gomez  8/6/2014        Allergic rhinitis ICD-10-CM: J30.9  ICD-9-CM: 477.9  8/6/2014        Diverticulosis of colon without diverticulitis ICD-10-CM: K57.30  ICD-9-CM: 562.10  8/6/2014            Principal Problem:    Sigmoid diverticulitis (10/14/2021)    Active Problems:    Obesity (BMI 30.0-34.9) (10/5/2020)      Abscess of sigmoid colon due to diverticulitis (10/14/2021)      LLQ pain (10/14/2021)           Number and Complexity of Problems addressed and   Risks of complications and/or morbidity of management        Acute diverticulitis with suspected 1cm intramural abscess in sigmoid colon  Continue inpt admission  He failed outpt Rx with Augmentin    NPO  IV fluids  IV Cipro/Flagyl  Serial exams  Serial labs  Monitor for complications  Continue home CPAP regiment  Will likely order a PICC line and TPN on Monday  Repeat CT scan next Thursday      This will likely require prolonged bowel rest.    He will likely need PICC and TPN starting next week      Headache  Refractory to morphine given earlier  Tylenol 1000mg PO now + Ibuprofen 600 mg PO now  If headache persists consider reconsulting hospitalist or neurology            Level of MDM (2/3 elements below)  Number and Complexity of Problems Addressed Amount and/or Complexity of Data to be Reviewed and Analyzed  *Each unique test, order, or document contributes to the combination of 2 or combination of 3 in Category 1 below.  Risk of Complications and/or Morbidity or Mortality of pt Management     87920  71777 SF Minimal  1self-limited or minor problem Minimal or none Minimal risk of morbidity from additional diagnostic testing or Rx   85902  27066 Low Low  2or more self-limited or minor problems;    or  1stable chronic illness;    or  5HOZBP, uncomplicated illness or injury   Limited  (Must meet the requirements of at least 1 of the 2 categories)  Category 1: Tests and documents   Any combination of 2 from the following:  Review of prior external note(s) from each unique source*;  review of the result(s) of each unique test*;   ordering of each unique test*    or   Category 2: Assessment requiring an independent historian(s)  (For the categories of independent interpretation of tests and discussion of management or test interpretation, see moderate or high) Low risk of morbidity from additional diagnostic testing or treatment     30653  82703 Mod Moderate  1or more chronic illnesses with exacerbation, progression, or side effects of treatment;    or  2or more stable chronic illnesses;    or  1undiagnosed new problem with uncertain prognosis;    or  1acute illness with systemic symptoms;    or  4MSOIL complicated injury   Moderate  (Must meet the requirements of at least 1 out of 3 categories)  Category 1: Tests, documents, or independent historian(s)  Any combination of 3 from the following:   Review of prior external note(s) from each unique source*;  Review of the result(s) of each unique test*;  Ordering of each unique test*;  Assessment requiring an independent historian(s)    or  Category 2: Independent interpretation of tests   Independent interpretation of a test performed by another physician/other qualified health care professional (not separately reported);     or  Category 3: Discussion of management or test interpretation  Discussion of management or test interpretation with external physician/other qualified health care professional/appropriate source (not separately reported)   Moderate risk of morbidity from additional diagnostic testing or treatment  Examples only:  Prescription drug management   Decision regarding minor surgery with identified patient or procedure risk factors  Decision regarding elective major surgery without identified patient or procedure risk factors   Diagnosis or treatment significantly limited by social determinants of health       41863  30498 High High  1or more chronic illnesses with severe exacerbation, progression, or side effects of treatment;    or  1 acute or chronic illness or injury that poses a threat to life or bodily function   Extensive  (Must meet the requirements of at least 2 out of 3 categories)  Category 1: Tests, documents, or independent historian(s)  Any combination of 3 from the following:   Review of prior external note(s) from each unique source*;  Review of the result(s) of each unique test*;   Ordering of each unique test*;   Assessment requiring an independent historian(s)    or   Category 2: Independent interpretation of tests   Independent interpretation of a test performed by another physician/other qualified health care professional (not separately reported);     or  Category 3: Discussion of management or test interpretation  Discussion of management or test interpretation with external physician/other qualified health care professional/appropriate source (not separately reported)   High risk of morbidity from additional diagnostic testing or treatment  Examples only:  Drug therapy requiring intensive monitoring for toxicity  Decision regarding elective major surgery with identified patient or procedure risk factors  Decision regarding emergency major surgery  Decision regarding hospitalization  Decision not to resuscitate or to de-escalate care because of poor prognosis             I have personally performed a face-to-face diagnostic evaluation and management  service on this patient. I have independently seen the patient. I have independently obtained the above history from the patient/family. I have independently examined the patient with above findings.   I have independently reviewed data/labs for this patient and developed the above plan of care (MDM). Signed: Lisa Alford.  Jayla Pisano MD, FACS

## 2021-10-15 NOTE — PROGRESS NOTES
Pt ambulatory and resting comfortably in bed. ABD pain controlled with Tylenol. Denies nausea.  NPO for bowel rest.

## 2021-10-15 NOTE — PROGRESS NOTES
Hospitalist Progress Note   Admit Date:  10/14/2021  3:03 PM   Name:  Jamesetta Babinski. Age:  50 y.o. Sex:  male  :  1965   MRN:  [id number]   Room:  [facility]/    Presenting Complaint: Abdominal Pain    Reason(s) for Admission: Sigmoid diverticulitis [K57.32]  Abscess of sigmoid colon due to diverticulitis [facility] Hospital Course & Interval History:   50 y.o. male with h/o diverticulitis who came to the ER on 10/14/21   complaining worsening severe LLQ pain that began on 10/8/2021. He had been taking Augmentin without resolution of pain. Patient seen by surgery yesterday evening who will now take over as primary. Subjective (10/15/21): Alert and oriented 3. Remains NPO. Endorses ongoing diffuse abdominal pain. **SURGERY IS PRIMARY. AT THIS TIME HOSPITALIST SERVICES SIGNING OFF. Assessment & Plan:   Sigmoid diverticulitis with abscess  Symptom onset on 10/8. Started on Augmentin on 10/12 with worsening symptoms. CT scan reviewed. It showed colonic inflammation proximal sigmoid colon with probable intramural abscess about 1 cm in diameter. Failed outpt therapy with augmentin  -start ciprofloxacin and flagy  -Follow-up blood cultures  -NPO  -SURGERY IS NOW PRIMARY AS OF YESTERDAY EVENING.   -antiemetics     Obesity :Body mass index is 32.89 kg/m².        Disposition: Inpatient admission, 2+ midnights  Diet: clear liquid  VTE ppx: lovenox  CODE STATUS: FULL      Hospital Problems as of 10/15/2021 Date Reviewed: 10/12/2021        Codes Class Noted - Resolved POA    * (Principal) Sigmoid diverticulitis ICD-10-CM: Y07.12  ICD-9-CM: 562.11  10/14/2021 - Present Yes        Abscess of sigmoid colon due to diverticulitis ICD-10-CM: K57.20  ICD-9-CM: 562.11, 569.5  10/14/2021 - Present Yes        LLQ pain ICD-10-CM: R10.32  ICD-9-CM: 789.04  10/14/2021 - Present Yes        Obesity (BMI 30.0-34.9) ICD-10-CM: E66.9  ICD-9-CM: 278.00  10/5/2020 - Present Yes              Objective:     Patient Vitals for the past 24 hrs:   Temp Pulse Resp BP SpO2   10/15/21 0758 98.7 °F (37.1 °C) 69 18 (!) 144/84 94 %   10/15/21 0450 98.5 °F (36.9 °C) 85 18 130/80 97 %   10/14/21 2328 98.5 °F (36.9 °C) 78 18 (!) 141/82 98 %   10/14/21 2153 98.7 °F (37.1 °C) 81 18 138/68 97 %   10/14/21 2059 99 °F (37.2 °C) 81 18 128/76 96 %   10/14/21 1946 98.9 °F (37.2 °C) 71 18 139/87 97 %   10/14/21 1448 99.6 °F (37.6 °C) 77 16 (!) 144/100 97 %     Oxygen Therapy  O2 Sat (%): 94 % (10/15/21 0758)  O2 Device: None (Room air) (10/14/21 1448)    Estimated body mass index is 32.89 kg/m² as calculated from the following:    Height as of this encounter: 5' 7\" (1.702 m). Weight as of this encounter: 95.3 kg (210 lb). Intake/Output Summary (Last 24 hours) at 10/15/2021 1118  Last data filed at 10/15/2021 0932  Gross per 24 hour   Intake 927.08 ml   Output 1250 ml   Net -322.92 ml         Physical Exam:     Blood pressure (!) 144/84, pulse 69, temperature 98.7 °F (37.1 °C), resp. rate 18, height 5' 7\" (1.702 m), weight 95.3 kg (210 lb), SpO2 94 %.     General:                     alert, awake, no acute distress. HENT:                         normocephalic, atraumatic. Eyes:                           anicteric sclerae, moist conjunctiva, EOMI  Neck:                          supple, non-tender. Trachea midline. Lungs:                        CTAB, no wheezing  Cardiac:                      RRR, Normal S1 and S2. Abdomen:                  Soft, non distended, TTP to LLQ, +BS, no guarding/rebound  Extremities:               No edema , pedal pulses present, no digital cyanosis  Skin:                           Warn, dry, normnal turgor and texture  Neuro:             AAOx3.  No gross focal neurological deficit  Psychiatric:                No anxiety, calm, cooperative     I have reviewed ordered lab tests and independently visualized imaging below:    Recent Labs:  Recent Results (from the past 48 hour(s))   CBC WITH AUTOMATED DIFF Collection Time: 10/14/21  3:02 PM   Result Value Ref Range    WBC 12.6 (H) 4.3 - 11.1 K/uL    RBC 4.71 4.23 - 5.6 M/uL    HGB 14.3 13.6 - 17.2 g/dL    HCT 42.6 41.1 - 50.3 %    MCV 90.4 79.6 - 97.8 FL    MCH 30.4 26.1 - 32.9 PG    MCHC 33.6 31.4 - 35.0 g/dL    RDW 12.3 11.9 - 14.6 %    PLATELET 642 646 - 951 K/uL    MPV 9.2 (L) 9.4 - 12.3 FL    ABSOLUTE NRBC 0.00 0.0 - 0.2 K/uL    DF AUTOMATED      NEUTROPHILS 80 (H) 43 - 78 %    LYMPHOCYTES 12 (L) 13 - 44 %    MONOCYTES 7 4.0 - 12.0 %    EOSINOPHILS 1 0.5 - 7.8 %    BASOPHILS 0 0.0 - 2.0 %    IMMATURE GRANULOCYTES 0 0.0 - 5.0 %    ABS. NEUTROPHILS 10.1 (H) 1.7 - 8.2 K/UL    ABS. LYMPHOCYTES 1.5 0.5 - 4.6 K/UL    ABS. MONOCYTES 0.9 0.1 - 1.3 K/UL    ABS. EOSINOPHILS 0.1 0.0 - 0.8 K/UL    ABS. BASOPHILS 0.0 0.0 - 0.2 K/UL    ABS. IMM. GRANS. 0.0 0.0 - 0.5 K/UL   METABOLIC PANEL, COMPREHENSIVE    Collection Time: 10/14/21  3:02 PM   Result Value Ref Range    Sodium 139 136 - 145 mmol/L    Potassium 3.5 3.5 - 5.1 mmol/L    Chloride 103 98 - 107 mmol/L    CO2 28 21 - 32 mmol/L    Anion gap 8 7 - 16 mmol/L    Glucose 147 (H) 65 - 100 mg/dL    BUN 14 6 - 23 MG/DL    Creatinine 0.98 0.8 - 1.5 MG/DL    GFR est AA >60 >60 ml/min/1.73m2    GFR est non-AA >60 >60 ml/min/1.73m2    Calcium 9.6 8.3 - 10.4 MG/DL    Bilirubin, total 0.6 0.2 - 1.1 MG/DL    ALT (SGPT) 32 12 - 65 U/L    AST (SGOT) 12 (L) 15 - 37 U/L    Alk.  phosphatase 77 50 - 136 U/L    Protein, total 9.1 (H) 6.3 - 8.2 g/dL    Albumin 3.8 3.5 - 5.0 g/dL    Globulin 5.3 (H) 2.3 - 3.5 g/dL    A-G Ratio 0.7 (L) 1.2 - 3.5     LIPASE    Collection Time: 10/14/21  3:02 PM   Result Value Ref Range    Lipase 56 (L) 73 - 393 U/L   CULTURE, BLOOD    Collection Time: 10/14/21  5:09 PM    Specimen: Blood   Result Value Ref Range    Special Requests: NO SPECIAL REQUESTS  LEFT  Antecubital        Culture result: NO GROWTH AFTER 14 HOURS     CULTURE, BLOOD    Collection Time: 10/14/21  6:03 PM    Specimen: Blood   Result Value Ref Range    Special Requests: RIGHT  HAND        Culture result: NO GROWTH AFTER 11 HOURS     METABOLIC PANEL, BASIC    Collection Time: 10/15/21  4:05 AM   Result Value Ref Range    Sodium 142 136 - 145 mmol/L    Potassium 3.9 3.5 - 5.1 mmol/L    Chloride 107 98 - 107 mmol/L    CO2 31 21 - 32 mmol/L    Anion gap 4 (L) 7 - 16 mmol/L    Glucose 101 (H) 65 - 100 mg/dL    BUN 10 6 - 23 MG/DL    Creatinine 0.89 0.8 - 1.5 MG/DL    GFR est AA >60 >60 ml/min/1.73m2    GFR est non-AA >60 >60 ml/min/1.73m2    Calcium 8.5 8.3 - 10.4 MG/DL   CBC W/O DIFF    Collection Time: 10/15/21  4:05 AM   Result Value Ref Range    WBC 10.4 4.3 - 11.1 K/uL    RBC 3.84 (L) 4.23 - 5.6 M/uL    HGB 11.5 (L) 13.6 - 17.2 g/dL    HCT 35.4 (L) 41.1 - 50.3 %    MCV 92.2 79.6 - 97.8 FL    MCH 29.9 26.1 - 32.9 PG    MCHC 32.5 31.4 - 35.0 g/dL    RDW 12.4 11.9 - 14.6 %    PLATELET 892 907 - 011 K/uL    MPV 9.2 (L) 9.4 - 12.3 FL    ABSOLUTE NRBC 0.00 0.0 - 0.2 K/uL       All Micro Results     Procedure Component Value Units Date/Time    CULTURE, BLOOD [[id number]] Collected: 10/14/21 1709    Order Status: Completed Specimen: Blood Updated: 10/15/21 0738     Special Requests: --        NO SPECIAL REQUESTS  LEFT  Antecubital       Culture result: NO GROWTH AFTER 14 HOURS       CULTURE, BLOOD [[id number]] Collected: 10/14/21 1803    Order Status: Completed Specimen: Blood Updated: 10/15/21 0738     Special Requests: --        RIGHT  HAND       Culture result: NO GROWTH AFTER 11 HOURS             Other Studies:  CT ABD PELV W CONT    Result Date: 10/14/2021  CT OF THE ABDOMEN AND PELVIS INDICATION: Left lower quadrant abdominal pain currently on antibiotics with persistent pain. Evaluate for corticated diverticulitis. Multiple axial images were obtained through the abdomen and pelvis. Oral contrast was used for bowel opacification. 100mL of Isovue 370 intravenous contrast was used for better evaluation of solid organs and vascular structures.   Radiation dose reduction techniques were used for this study. All CT scans performed at this facility use one or all of the following: Automated exposure control, adjustment of the mA and/or kVp according to patient's size, iterative reconstruction. COMPARISON: CT abdomen and pelvis 6/7/2017. FINDINGS: LOWER CHEST: Normal. HEPATOBILIARY: Subtle low-attenuation liver lesions are present most likely cysts and too small to characterize by CT. These may be slightly larger than on the prior exam from 2017. No new liver lesions are appreciated. No calcified gallstones. PANCREAS: Normal. SPLEEN: Normal. ADRENAL GLANDS: Normal. KIDNEYS/BLADDER: Kidneys and bladder are unremarkable. No urinary tract calculi or hydronephrosis. BOWEL: No evidence of bowel obstruction. Appendix is normal. There is marked inflammation and wall thickening in the region of the proximal sigmoid colon with surrounding mesenteric inflammation. There is likely a small intramural fluid collection involving the wall of the colon. No extra colonic fluid collection or free air is evident. Several small probable reactive mesenteric nodes are present in this area. LYMPH NODES: Small left lower quadrant mesenteric lymph nodes in the area of the colonic inflammation and cortical thickening. Small retroperitoneal nodes are also present but are not enlarged by CT criteria and appear stable. No enlarged pelvic lymph nodes. VASCULATURE: Unremarkable. PELVIC ORGANS: Prostate gland and rectum are unremarkable. No free pelvic fluid. MUSCULOSKELETAL: Normal.     Colonic inflammation proximal sigmoid colon with probable intramural abscess involving the wall of the sigmoid colon. This measures approximately 1 cm in diameter. No extracolonic abscess or free intraperitoneal air. Small adjacent reactive lymph nodes are present.        Current Meds:  Current Facility-Administered Medications   Medication Dose Route Frequency    ciprofloxacin (CIPRO) 400 mg in D5W IVPB (premix)  400 mg IntraVENous Q12H    metroNIDAZOLE (FLAGYL) IVPB premix 500 mg  500 mg IntraVENous Q8H    sodium chloride (NS) flush 5-40 mL  5-40 mL IntraVENous PRN    acetaminophen (TYLENOL) tablet 650 mg  650 mg Oral Q6H PRN    promethazine (PHENERGAN) tablet 12.5 mg  12.5 mg Oral Q6H PRN    Or    ondansetron (ZOFRAN) injection 4 mg  4 mg IntraVENous Q6H PRN    enoxaparin (LOVENOX) injection 40 mg  40 mg SubCUTAneous Q24H    lactated Ringers infusion  125 mL/hr IntraVENous CONTINUOUS    famotidine (PF) (PEPCID) 20 mg in 0.9% sodium chloride 10 mL injection  20 mg IntraVENous Q12H    morphine injection 2 mg  2 mg IntraVENous Q1H PRN       Signed:  Puma Miranda NP

## 2021-10-15 NOTE — CONSULTS
H&P/Consult Note/Progress Note/Office Note:   Trent Butt MRN: [id number]  :1965  Age:55 y.o.    HPI: Trent Butt is a 50 y.o. male with h/o diverticulitis who came to the ER on 10/14/21   complaining worsening severe LLQ pain that began on 10/8/2021. He reported he was treated with Augmentin  for suspected diverticulitis. Despite the Augmentin his pain worsened. Nothing in particular made his symptoms better. He reported associated nausea and fever. He had a percutaneous drain placed for diverticulitis in . He is s/p [facility] with mesh in   He is s/p colonoscopy on 2017 by Dr. Maura Wakefield. Surgery was consulted to see the patient by Dr. Swati Goode. 10/14/21 CT abd/pelvis with oral and IV contrast  Hx: LLQ abd pain currently on antibiotics with persistent pain.      LOWER CHEST: Normal.     HEPATOBILIARY: Subtle low-attenuation liver lesions are present most likely cysts and too small to characterize by CT. These may be slightly larger than on the prior exam from 2017. No new liver lesions are appreciated. No calcified gallstones.     PANCREAS: Normal.  SPLEEN: Normal.  ADRENAL GLANDS: Normal.  KIDNEYS/BLADDER: Kidneys and bladder are unremarkable. No urinary tract calculi or hydronephrosis.     BOWEL: No evidence of bowel obstruction. Appendix is normal.   There is marked inflammation and wall thickening in the region of the proximal sigmoid colon with surrounding mesenteric inflammation. There is likely a small intramural fluid collection involving the wall of the colon. No extra colonic fluid collection or free air is evident. Several small probable reactive mesenteric nodes are present in this area.     LYMPH NODES: Small left lower quadrant mesenteric lymph nodes in the area of the colonic inflammation and cortical thickening. Small retroperitoneal nodes are also present but are not enlarged by CT criteria and appear stable.  No enlarged pelvic lymph nodes.     VASCULATURE: Unremarkable. PELVIC ORGANS: Prostate gland and rectum are unremarkable. No free pelvic fluid. MUSCULOSKELETAL: Normal.     IMPRESSION  Colonic inflammation proximal sigmoid colon with probable intramural abscess involving the wall of the sigmoid colon. This measures approximately 1 cm in diameter. No extracolonic abscess or free intraperitoneal air.    Small adjacent reactive lymph nodes are present.               Past Medical History:   Diagnosis Date    Diverticulosis     H/O seasonal allergies     Unspecified sleep apnea     does not use cpap     Past Surgical History:   Procedure Laterality Date    COLONOSCOPY N/A 12/13/2017    COLONOSCOPY  BMI 31 performed by Pippa Thomas MD at [address] HX HERNIA REPAIR      HX ORTHOPAEDIC Right 2009    rolled bicep    ID ABDOMEN SURGERY PROC UNLISTED  2008    perc drain diverticular abscess    ID COLONOSCOPY FLX DX W/COLLJ SPEC WHEN PFRMD  12/13/2017          Current Facility-Administered Medications   Medication Dose Route Frequency    sodium chloride (NS) flush 5-10 mL  5-10 mL IntraVENous Q8H    sodium chloride (NS) flush 5-10 mL  5-10 mL IntraVENous PRN    saline peripheral flush soln 10 mL  10 mL InterCATHeter RAD ONCE    iopamidoL (ISOVUE-370) 76 % injection 100 mL  100 mL IntraVENous RAD ONCE    ciprofloxacin (CIPRO) 400 mg in D5W IVPB (premix)  400 mg IntraVENous Q12H    metroNIDAZOLE (FLAGYL) IVPB premix 500 mg  500 mg IntraVENous Q8H    sodium chloride (NS) flush 5-40 mL  5-40 mL IntraVENous Q8H    sodium chloride (NS) flush 5-40 mL  5-40 mL IntraVENous PRN    acetaminophen (TYLENOL) tablet 650 mg  650 mg Oral Q6H PRN    Or    acetaminophen (TYLENOL) suppository 650 mg  650 mg Rectal Q6H PRN    polyethylene glycol (MIRALAX) packet 17 g  17 g Oral DAILY PRN    promethazine (PHENERGAN) tablet 12.5 mg  12.5 mg Oral Q6H PRN    Or    ondansetron (ZOFRAN) injection 4 mg  4 mg IntraVENous Q6H PRN    enoxaparin (LOVENOX) injection 40 mg  40 mg SubCUTAneous Q24H     Current Outpatient Medications   Medication Sig    amoxicillin-clavulanate (AUGMENTIN) 875-125 mg per tablet Take 1 Tablet by mouth every twelve (12) hours for 10 days.  loratadine (CLARITIN) 10 mg tablet Take 10 mg by mouth daily. Darvocet a500 [propoxyphene n-acetaminophen]  Social History     Socioeconomic History    Marital status:      Spouse name: Not on file    Number of children: Not on file    Years of education: Not on file    Highest education level: Not on file   Tobacco Use    Smoking status: Never Smoker    Smokeless tobacco: Never Used   Substance and Sexual Activity    Alcohol use: No    Drug use: No    Sexual activity: Yes     Partners: Female     Social Determinants of Health     Financial Resource Strain:     Difficulty of Paying Living Expenses:    Food Insecurity:     Worried About Running Out of Food in the Last Year:     [address] in the Last Year:    Transportation Needs:     Lack of Transportation (Medical):      Lack of Transportation (Non-Medical):    Physical Activity:     Days of Exercise per Week:     Minutes of Exercise per Session:    Stress:     Feeling of Stress :    Social Connections:     Frequency of Communication with Friends and Family:     Frequency of Social Gatherings with Friends and Family:     Attends [employer] Services:     Active Member of Clubs or Organizations:     Attends Club or Organization Meetings:     Marital Status:      Social History     Tobacco Use   Smoking Status Never Smoker   Smokeless Tobacco Never Used     Family History   Problem Relation Age of Onset    Diabetes Mother    [facility] COPD Mother     Kidney Disease Mother     Heart Disease Mother     Hypertension Mother     Cancer Father         prostate    Other Father         abdominal aortic aneurysm    Sleep Apnea Father     Diabetes Maternal Grandmother     Diabetes Sister     Malignant Hyperthermia Neg Hx     Pseudocholinesterase Deficiency Neg Hx     Delayed Awakening Neg Hx     Post-op Nausea/Vomiting Neg Hx     Emergence Delirium Neg Hx     Post-op Cognitive Dysfunction Neg Hx      ROS: The patient has no difficulty with chest pain or shortness of breath. No fever or chills. Comprehensive review of systems was otherwise unremarkable except as noted above. Physical Exam:   Visit Vitals  /87   Pulse 71   Temp 98.9 °F (37.2 °C)   Resp 18   Ht 5' 7\" (1.702 m)   Wt 210 lb (95.3 kg)   SpO2 97%   BMI 32.89 kg/m²     Vitals:    10/14/21 1448 10/14/21 1730 10/14/21 1946   BP: (!) 144/100  139/87   Pulse: 77  71   Resp: 16  18   Temp: 99.6 °F (37.6 °C)  98.9 °F (37.2 °C)   SpO2: 97%  97%   Weight:  210 lb (95.3 kg)    Height:  5' 7\" (1.702 m)      No intake/output data recorded. No intake/output data recorded. Constitutional: Alert, oriented, cooperative patient in no acute distress; appears stated age    Eyes:Sclera are clear. EOMs intact  ENMT: no external lesions gross hearing normal; no obvious neck masses, no ear or lip lesions, nares normal  CV: RRR. Normal perfusion  Resp: No JVD. Breathing is  non-labored; no audible wheezing. GI: soft and non-distended; Tender in LLQ     Musculoskeletal: unremarkable with normal function. No embolic signs or cyanosis.    Neuro:  Oriented; moves all 4; no focal deficits  Psychiatric: normal affect and mood, no memory impairment    Recent vitals (if inpt):  Patient Vitals for the past 24 hrs:   BP Temp Pulse Resp SpO2 Height Weight   10/14/21 1946 139/87 98.9 °F (37.2 °C) 71 18 97 %     10/14/21 1730      5' 7\" (1.702 m) 210 lb (95.3 kg)   10/14/21 1448 (!) 144/100 99.6 °F (37.6 °C) 77 16 97 %         Amount and/or Complexity of Data Reviewed and Analyzed:  I reviewed and analyzed all of the unique labs and radiologic studies that are shown below as well as any that are in the HPI, and any that are in the expanded problem list below  *Each unique test, order, or document contributes to the combination of 2 or combination of 3 in Category 1 below. For this visit I also reviewed old records and prior notes. Recent Labs     10/14/21  1502   WBC 12.6*   HGB 14.3         K 3.5      CO2 28   BUN 14   CREA 0.98   *   TBILI 0.6   ALT 32   AP 77   LPSE 56*     Review of most recent CBC  Lab Results   Component Value Date/Time    WBC 12.6 (H) 10/14/2021 03:02 PM    HGB 14.3 10/14/2021 03:02 PM    HCT 42.6 10/14/2021 03:02 PM    PLATELET 566 02/65/2166 03:02 PM    MCV 90.4 10/14/2021 03:02 PM       Review of most recent BMP  Lab Results   Component Value Date/Time    Sodium 139 10/14/2021 03:02 PM    Potassium 3.5 10/14/2021 03:02 PM    Chloride 103 10/14/2021 03:02 PM    CO2 28 10/14/2021 03:02 PM    Anion gap 8 10/14/2021 03:02 PM    Glucose 147 (H) 10/14/2021 03:02 PM    BUN 14 10/14/2021 03:02 PM    Creatinine 0.98 10/14/2021 03:02 PM    BUN/Creatinine ratio 15 10/19/2020 07:21 AM    GFR est AA >60 10/14/2021 03:02 PM    GFR est non-AA >60 10/14/2021 03:02 PM    Calcium 9.6 10/14/2021 03:02 PM       Review of most recent LFTs (and lipase if done)  Lab Results   Component Value Date/Time    ALT (SGPT) 32 10/14/2021 03:02 PM    AST (SGOT) 12 (L) 10/14/2021 03:02 PM    Alk.  phosphatase 77 10/14/2021 03:02 PM    Bilirubin, total 0.6 10/14/2021 03:02 PM     Lab Results   Component Value Date/Time    Lipase 56 (L) 10/14/2021 03:02 PM       Lab Results   Component Value Date/Time    INR  05/12/2008 08:55 PM     1.1  Suggested therapeutic INR range:  Venous thrombosis and embolus            2.0-3.0  Prosthetic heart valve                   2.5-3.5    aPTT 30.5  HEPARIN THERAPY RANGE:  48 - 75 SECONDS 05/12/2008 08:55 PM       Review of most recent HgbA1c  No results found for: HBA1C, NXX3DHWG, ZJA5WOMC, HIO8CXOX    Nutritional assessment screen for wound healing issues:  Lab Results   Component Value Date/Time    Protein, total 9.1 (H) 10/14/2021 03:02 PM    Albumin 3.8 10/14/2021 03:02 PM       @lastcovr@  XR Results (most recent):  Results from Appointment encounter on 03/31/21    XR KNEE RT MIN 4 V    Narrative  AUTOMATIC ADMINISTRATIVE RESULT    The result for this exam can be found in the Progress note in the chart. Impression  :  See Progress note in the chart. CT Results (most recent):  Results from Hospital Encounter encounter on 10/14/21    CT ABD PELV W CONT    Narrative  CT OF THE ABDOMEN AND PELVIS    INDICATION: Left lower quadrant abdominal pain currently on antibiotics with  persistent pain. Evaluate for corticated diverticulitis. Multiple axial images were obtained through the abdomen and pelvis. Oral  contrast was used for bowel opacification. 100mL of Isovue 370 intravenous  contrast was used for better evaluation of solid organs and vascular structures. Radiation dose reduction techniques were used for this study. All CT scans  performed at this facility use one or all of the following: Automated exposure  control, adjustment of the mA and/or kVp according to patient's size, iterative  reconstruction. COMPARISON: CT abdomen and pelvis 6/7/2017. FINDINGS:  LOWER CHEST: Normal.    HEPATOBILIARY: Subtle low-attenuation liver lesions are present most likely  cysts and too small to characterize by CT. These may be slightly larger than on  the prior exam from 2017. No new liver lesions are appreciated. No calcified  gallstones. PANCREAS: Normal.    SPLEEN: Normal.    ADRENAL GLANDS: Normal.    KIDNEYS/BLADDER: Kidneys and bladder are unremarkable. No urinary tract calculi  or hydronephrosis. BOWEL: No evidence of bowel obstruction. Appendix is normal. There is marked  inflammation and wall thickening in the region of the proximal sigmoid colon  with surrounding mesenteric inflammation. There is likely a small intramural  fluid collection involving the wall of the colon.  No extra colonic fluid  collection or free air is evident. Several small probable reactive mesenteric  nodes are present in this area. LYMPH NODES: Small left lower quadrant mesenteric lymph nodes in the area of the  colonic inflammation and cortical thickening. Small retroperitoneal nodes are  also present but are not enlarged by CT criteria and appear stable. No enlarged  pelvic lymph nodes. VASCULATURE: Unremarkable. PELVIC ORGANS: Prostate gland and rectum are unremarkable. No free pelvic fluid. MUSCULOSKELETAL: Normal.    Impression  Colonic inflammation proximal sigmoid colon with probable intramural  abscess involving the wall of the sigmoid colon. This measures approximately 1  cm in diameter. No extracolonic abscess or free intraperitoneal air. Small  adjacent reactive lymph nodes are present. US Results (most recent):  No results found for this or any previous visit.         Admission date (for inpatients): 10/14/2021   * No surgery found *  * No surgery found *        ASSESSMENT/PLAN:  Problem List  Date Reviewed: 10/12/2021        Codes Class Noted    * (Principal) Sigmoid diverticulitis ICD-10-CM: T61.53  ICD-9-CM: 562.11  10/14/2021        Abscess of sigmoid colon due to diverticulitis ICD-10-CM: K57.20  ICD-9-CM: 562.11, 569.5  10/14/2021        LLQ pain ICD-10-CM: R10.32  ICD-9-CM: 789.04  10/14/2021        Obesity (BMI 30.0-34.9) ICD-10-CM: E66.9  ICD-9-CM: 278.00  10/5/2020        Transient disorder of initiating or maintaining sleep ICD-10-CM: F51.02  ICD-9-CM: 307.41  10/5/2020        Hyperlipemia ICD-10-CM: E78.5  ICD-9-CM: 272.4  8/6/2014        FRENCH on CPAP ICD-10-CM: G47.33, Z99.89  ICD-9-CM: 327.23, V46.8  8/6/2014        HA (headache) ICD-10-CM: R51.9  ICD-9-CM: 784.0  8/6/2014        FH: prostate cancer ICD-10-CM: Z80.42  ICD-9-CM: V16.42  8/6/2014        FHx: AAA ICD-10-CM: [id number]  ICD-9-CM: PPY6369  8/6/2014        Allergic rhinitis ICD-10-CM: J30.9  ICD-9-CM: 477.9  8/6/2014        Diverticulosis of colon without diverticulitis ICD-10-CM: K57.30  ICD-9-CM: 562.10  8/6/2014            Principal Problem:    Sigmoid diverticulitis (10/14/2021)    Active Problems:    Obesity (BMI 30.0-34.9) (10/5/2020)      Abscess of sigmoid colon due to diverticulitis (10/14/2021)      LLQ pain (10/14/2021)           Number and Complexity of Problems addressed and   Risks of complications and/or morbidity of management        Acute diverticulitis with suspected 1cm intramural abscess in sigmoid colon  Inpt admission  NPO  IV fluids  IV antibiotics  Serial exams  Serial labs  Monitor for complications    This will likely require prolonged bowel rest.    He will likely need PICC and TPN starting next week    Transfer to surgical service if OK with Hospitalists            Level of MDM (2/3 elements below)  Number and Complexity of Problems Addressed Amount and/or Complexity of Data to be Reviewed and Analyzed  *Each unique test, order, or document contributes to the combination of 2 or combination of 3 in Category 1 below.  Risk of Complications and/or Morbidity or Mortality of pt Management     65934  25633 SF Minimal  1self-limited or minor problem Minimal or none Minimal risk of morbidity from additional diagnostic testing or Rx   92447  54101 Low Low  2or more self-limited or minor problems;    or  1stable chronic illness;    or  9HBPKG, uncomplicated illness or injury   Limited  (Must meet the requirements of at least 1 of the 2 categories)  Category 1: Tests and documents   Any combination of 2 from the following:  Review of prior external note(s) from each unique source*;  review of the result(s) of each unique test*;   ordering of each unique test*    or   Category 2: Assessment requiring an independent historian(s)  (For the categories of independent interpretation of tests and discussion of management or test interpretation, see moderate or high) Low risk of morbidity from additional diagnostic testing or treatment 51563  99926 Mod Moderate  1or more chronic illnesses with exacerbation, progression, or side effects of treatment;    or  2or more stable chronic illnesses;    or  1undiagnosed new problem with uncertain prognosis;    or  1acute illness with systemic symptoms;    or  2ZRINA complicated injury   Moderate  (Must meet the requirements of at least 1 out of 3 categories)  Category 1: Tests, documents, or independent historian(s)  Any combination of 3 from the following:   Review of prior external note(s) from each unique source*;  Review of the result(s) of each unique test*;  Ordering of each unique test*;  Assessment requiring an independent historian(s)    or  Category 2: Independent interpretation of tests   Independent interpretation of a test performed by another physician/other qualified health care professional (not separately reported);     or  Category 3: Discussion of management or test interpretation  Discussion of management or test interpretation with external physician/other qualified health care professional/appropriate source (not separately reported)   Moderate risk of morbidity from additional diagnostic testing or treatment  Examples only:  Prescription drug management   Decision regarding minor surgery with identified patient or procedure risk factors  Decision regarding elective major surgery without identified patient or procedure risk factors   Diagnosis or treatment significantly limited by social determinants of health       63553  61022 High High  1or more chronic illnesses with severe exacerbation, progression, or side effects of treatment;    or  1 acute or chronic illness or injury that poses a threat to life or bodily function   Extensive  (Must meet the requirements of at least 2 out of 3 categories)  Category 1: Tests, documents, or independent historian(s)  Any combination of 3 from the following:   Review of prior external note(s) from each unique source*;  Review of the result(s) of each unique test*;   Ordering of each unique test*;   Assessment requiring an independent historian(s)    or   Category 2: Independent interpretation of tests   Independent interpretation of a test performed by another physician/other qualified health care professional (not separately reported);     or  Category 3: Discussion of management or test interpretation  Discussion of management or test interpretation with external physician/other qualified health care professional/appropriate source (not separately reported)   High risk of morbidity from additional diagnostic testing or treatment  Examples only:  Drug therapy requiring intensive monitoring for toxicity  Decision regarding elective major surgery with identified patient or procedure risk factors  Decision regarding emergency major surgery  Decision regarding hospitalization  Decision not to resuscitate or to de-escalate care because of poor prognosis             I have personally performed a face-to-face diagnostic evaluation and management  service on this patient. I have independently seen the patient. I have independently obtained the above history from the patient/family. I have independently examined the patient with above findings. I have independently reviewed data/labs for this patient and developed the above plan of care (MDM). Signed: Rebecca Villar MD, FACS

## 2021-10-16 LAB
ANION GAP SERPL CALC-SCNC: 3 MMOL/L (ref 7–16)
BUN SERPL-MCNC: 10 MG/DL (ref 6–23)
CALCIUM SERPL-MCNC: 8.7 MG/DL (ref 8.3–10.4)
CHLORIDE SERPL-SCNC: 106 MMOL/L (ref 98–107)
CO2 SERPL-SCNC: 33 MMOL/L (ref 21–32)
CREAT SERPL-MCNC: 0.98 MG/DL (ref 0.8–1.5)
ERYTHROCYTE [DISTWIDTH] IN BLOOD BY AUTOMATED COUNT: 12.2 % (ref 11.9–14.6)
GLUCOSE SERPL-MCNC: 133 MG/DL (ref 65–100)
HCT VFR BLD AUTO: 35.5 % (ref 41.1–50.3)
HGB BLD-MCNC: 11.5 G/DL (ref 13.6–17.2)
MCH RBC QN AUTO: 29.9 PG (ref 26.1–32.9)
MCHC RBC AUTO-ENTMCNC: 32.4 G/DL (ref 31.4–35)
MCV RBC AUTO: 92.4 FL (ref 79.6–97.8)
NRBC # BLD: 0 K/UL (ref 0–0.2)
PLATELET # BLD AUTO: 251 K/UL (ref 150–450)
PMV BLD AUTO: 9.4 FL (ref 9.4–12.3)
POTASSIUM SERPL-SCNC: 3.8 MMOL/L (ref 3.5–5.1)
RBC # BLD AUTO: 3.84 M/UL (ref 4.23–5.6)
SODIUM SERPL-SCNC: 142 MMOL/L (ref 136–145)
WBC # BLD AUTO: 9.7 K/UL (ref 4.3–11.1)

## 2021-10-16 PROCEDURE — 85027 COMPLETE CBC AUTOMATED: CPT

## 2021-10-16 PROCEDURE — 74011250637 HC RX REV CODE- 250/637: Performed by: NURSE PRACTITIONER

## 2021-10-16 PROCEDURE — 74011250636 HC RX REV CODE- 250/636: Performed by: INTERNAL MEDICINE

## 2021-10-16 PROCEDURE — 36415 COLL VENOUS BLD VENIPUNCTURE: CPT

## 2021-10-16 PROCEDURE — 80048 BASIC METABOLIC PNL TOTAL CA: CPT

## 2021-10-16 PROCEDURE — 74011250637 HC RX REV CODE- 250/637: Performed by: INTERNAL MEDICINE

## 2021-10-16 PROCEDURE — 65270000029 HC RM PRIVATE

## 2021-10-16 PROCEDURE — 74011000250 HC RX REV CODE- 250: Performed by: SURGERY

## 2021-10-16 PROCEDURE — 74011250636 HC RX REV CODE- 250/636: Performed by: EMERGENCY MEDICINE

## 2021-10-16 PROCEDURE — 74011250636 HC RX REV CODE- 250/636: Performed by: SURGERY

## 2021-10-16 RX ORDER — OXYCODONE AND ACETAMINOPHEN 5; 325 MG/1; MG/1
1 TABLET ORAL ONCE
Status: COMPLETED | OUTPATIENT
Start: 2021-10-16 | End: 2021-10-16

## 2021-10-16 RX ADMIN — ONDANSETRON 4 MG: 2 INJECTION INTRAMUSCULAR; INTRAVENOUS at 09:31

## 2021-10-16 RX ADMIN — POTASSIUM CHLORIDE, DEXTROSE MONOHYDRATE AND SODIUM CHLORIDE 100 ML/HR: 150; 5; 450 INJECTION, SOLUTION INTRAVENOUS at 04:21

## 2021-10-16 RX ADMIN — FAMOTIDINE 20 MG: 10 INJECTION INTRAVENOUS at 09:24

## 2021-10-16 RX ADMIN — METRONIDAZOLE 500 MG: 500 INJECTION, SOLUTION INTRAVENOUS at 16:41

## 2021-10-16 RX ADMIN — FAMOTIDINE 20 MG: 10 INJECTION INTRAVENOUS at 21:32

## 2021-10-16 RX ADMIN — METRONIDAZOLE 500 MG: 500 INJECTION, SOLUTION INTRAVENOUS at 09:19

## 2021-10-16 RX ADMIN — ENOXAPARIN SODIUM 40 MG: 40 INJECTION SUBCUTANEOUS at 21:32

## 2021-10-16 RX ADMIN — ONDANSETRON 4 MG: 2 INJECTION INTRAMUSCULAR; INTRAVENOUS at 17:54

## 2021-10-16 RX ADMIN — CIPROFLOXACIN 400 MG: 2 INJECTION, SOLUTION INTRAVENOUS at 16:42

## 2021-10-16 RX ADMIN — ACETAMINOPHEN 650 MG: 325 TABLET, FILM COATED ORAL at 09:23

## 2021-10-16 RX ADMIN — CIPROFLOXACIN 400 MG: 2 INJECTION, SOLUTION INTRAVENOUS at 04:15

## 2021-10-16 RX ADMIN — POTASSIUM CHLORIDE, DEXTROSE MONOHYDRATE AND SODIUM CHLORIDE 100 ML/HR: 150; 5; 450 INJECTION, SOLUTION INTRAVENOUS at 21:42

## 2021-10-16 RX ADMIN — OXYCODONE AND ACETAMINOPHEN 1 TABLET: 5; 325 TABLET ORAL at 21:32

## 2021-10-16 NOTE — PROGRESS NOTES
General Surgery Progress Note    10/16/2021    Admit Date: 10/14/2021    Subjective:     Surgery On Call (for Dr. Kym Padilla)  The patient reports mild LLQ pain. Passing flatus. No BM in 2 days. Patient has a headache. Objective:     Visit Vitals  /86 (BP 1 Location: Left upper arm, BP Patient Position: Supine)   Pulse 76   Temp 98.5 °F (36.9 °C)   Resp 15   Ht 5' 7\" (1.702 m)   Wt 210 lb (95.3 kg)   SpO2 94%   BMI 32.89 kg/m²         Intake/Output Summary (Last 24 hours) at 10/16/2021 1021  Last data filed at 10/15/2021 2308  Gross per 24 hour   Intake    Output 875 ml   Net -875 ml        EXAM:  ABD soft, mild LLQ tenderness to palpation, active BS's.  No peritoneal signs       Data Review    Recent Results (from the past 24 hour(s))   METABOLIC PANEL, BASIC    Collection Time: 10/16/21  5:08 AM   Result Value Ref Range    Sodium 142 136 - 145 mmol/L    Potassium 3.8 3.5 - 5.1 mmol/L    Chloride 106 98 - 107 mmol/L    CO2 33 (H) 21 - 32 mmol/L    Anion gap 3 (L) 7 - 16 mmol/L    Glucose 133 (H) 65 - 100 mg/dL    BUN 10 6 - 23 MG/DL    Creatinine 0.98 0.8 - 1.5 MG/DL    GFR est AA >60 >60 ml/min/1.73m2    GFR est non-AA >60 >60 ml/min/1.73m2    Calcium 8.7 8.3 - 10.4 MG/DL   CBC W/O DIFF    Collection Time: 10/16/21  5:08 AM   Result Value Ref Range    WBC 9.7 4.3 - 11.1 K/uL    RBC 3.84 (L) 4.23 - 5.6 M/uL    HGB 11.5 (L) 13.6 - 17.2 g/dL    HCT 35.5 (L) 41.1 - 50.3 %    MCV 92.4 79.6 - 97.8 FL    MCH 29.9 26.1 - 32.9 PG    MCHC 32.4 31.4 - 35.0 g/dL    RDW 12.2 11.9 - 14.6 %    PLATELET 326 007 - 483 K/uL    MPV 9.4 9.4 - 12.3 FL    ABSOLUTE NRBC 0.00 0.0 - 0.2 K/uL        Hospital Problems  Date Reviewed: 10/12/2021        Codes Class Noted POA    Headache ICD-10-CM: R51.9  ICD-9-CM: 784.0  10/15/2021 Yes        * (Principal) Sigmoid diverticulitis ICD-10-CM: V56.60  ICD-9-CM: 562.11  10/14/2021 Yes        Abscess of sigmoid colon due to diverticulitis ICD-10-CM: K57.20  ICD-9-CM: 562.11, 569.5  10/14/2021 Yes        LLQ pain ICD-10-CM: R10.32  ICD-9-CM: 789.04  10/14/2021 Yes        Obesity (BMI 30.0-34.9) ICD-10-CM: E66.9  ICD-9-CM: 278.00  10/5/2020 Yes          1. Cipro/Flagyl  2. Bowel rest  3. Medical care. 4. No surgery planned, as per Dr. Rosie Chatterjee.   Robert Torres MD.

## 2021-10-16 NOTE — PROGRESS NOTES
Care Management Interventions  PCP Verified by CM: Yes  Support Systems: Spouse/Significant Other  Discharge Location  Discharge Placement: Home with family assistance  50 MM with hx diverticulosis. Abscess of sigmoid colon due to diverticulitis. Insurance with [employer]. Chart screened by  for discharge planning. No needs identified at this time. Please consult  if any new issues arise.

## 2021-10-17 LAB
ANION GAP SERPL CALC-SCNC: 4 MMOL/L (ref 7–16)
BUN SERPL-MCNC: 8 MG/DL (ref 6–23)
CALCIUM SERPL-MCNC: 8.9 MG/DL (ref 8.3–10.4)
CHLORIDE SERPL-SCNC: 106 MMOL/L (ref 98–107)
CO2 SERPL-SCNC: 30 MMOL/L (ref 21–32)
CREAT SERPL-MCNC: 1.02 MG/DL (ref 0.8–1.5)
ERYTHROCYTE [DISTWIDTH] IN BLOOD BY AUTOMATED COUNT: 12.1 % (ref 11.9–14.6)
GLUCOSE SERPL-MCNC: 120 MG/DL (ref 65–100)
HCT VFR BLD AUTO: 35.8 % (ref 41.1–50.3)
HGB BLD-MCNC: 11.8 G/DL (ref 13.6–17.2)
MCH RBC QN AUTO: 30.3 PG (ref 26.1–32.9)
MCHC RBC AUTO-ENTMCNC: 33 G/DL (ref 31.4–35)
MCV RBC AUTO: 91.8 FL (ref 79.6–97.8)
NRBC # BLD: 0 K/UL (ref 0–0.2)
PLATELET # BLD AUTO: 281 K/UL (ref 150–450)
PMV BLD AUTO: 9.1 FL (ref 9.4–12.3)
POTASSIUM SERPL-SCNC: 3.9 MMOL/L (ref 3.5–5.1)
RBC # BLD AUTO: 3.9 M/UL (ref 4.23–5.6)
SODIUM SERPL-SCNC: 140 MMOL/L (ref 136–145)
WBC # BLD AUTO: 10 K/UL (ref 4.3–11.1)

## 2021-10-17 PROCEDURE — 74011250637 HC RX REV CODE- 250/637: Performed by: INTERNAL MEDICINE

## 2021-10-17 PROCEDURE — 74011000250 HC RX REV CODE- 250: Performed by: SURGERY

## 2021-10-17 PROCEDURE — 74011250636 HC RX REV CODE- 250/636: Performed by: INTERNAL MEDICINE

## 2021-10-17 PROCEDURE — 74011250636 HC RX REV CODE- 250/636: Performed by: EMERGENCY MEDICINE

## 2021-10-17 PROCEDURE — 65270000029 HC RM PRIVATE

## 2021-10-17 PROCEDURE — 85027 COMPLETE CBC AUTOMATED: CPT

## 2021-10-17 PROCEDURE — 36415 COLL VENOUS BLD VENIPUNCTURE: CPT

## 2021-10-17 PROCEDURE — 74011250636 HC RX REV CODE- 250/636: Performed by: SURGERY

## 2021-10-17 PROCEDURE — 80048 BASIC METABOLIC PNL TOTAL CA: CPT

## 2021-10-17 RX ADMIN — FAMOTIDINE 20 MG: 10 INJECTION INTRAVENOUS at 21:54

## 2021-10-17 RX ADMIN — METRONIDAZOLE 500 MG: 500 INJECTION, SOLUTION INTRAVENOUS at 00:12

## 2021-10-17 RX ADMIN — ACETAMINOPHEN 650 MG: 325 TABLET, FILM COATED ORAL at 09:11

## 2021-10-17 RX ADMIN — CIPROFLOXACIN 400 MG: 2 INJECTION, SOLUTION INTRAVENOUS at 17:02

## 2021-10-17 RX ADMIN — METRONIDAZOLE 500 MG: 500 INJECTION, SOLUTION INTRAVENOUS at 17:02

## 2021-10-17 RX ADMIN — FAMOTIDINE 20 MG: 10 INJECTION INTRAVENOUS at 09:11

## 2021-10-17 RX ADMIN — ONDANSETRON 4 MG: 2 INJECTION INTRAMUSCULAR; INTRAVENOUS at 17:04

## 2021-10-17 RX ADMIN — CIPROFLOXACIN 400 MG: 2 INJECTION, SOLUTION INTRAVENOUS at 05:21

## 2021-10-17 RX ADMIN — ONDANSETRON 4 MG: 2 INJECTION INTRAMUSCULAR; INTRAVENOUS at 09:16

## 2021-10-17 RX ADMIN — METRONIDAZOLE 500 MG: 500 INJECTION, SOLUTION INTRAVENOUS at 09:11

## 2021-10-17 RX ADMIN — ONDANSETRON 4 MG: 2 INJECTION INTRAMUSCULAR; INTRAVENOUS at 00:12

## 2021-10-17 NOTE — PROGRESS NOTES
Notified on call General Surgery of pt headache and not relieved by tylenol and states the morphine seemed to make it worse when tried.

## 2021-10-17 NOTE — PROGRESS NOTES
General Surgery Progress Note    10/17/2021    Admit Date: 10/14/2021    Subjective:     Surgery On Call (for Dr. Dewight Severin)    The patient reports mild LLQ pain. Passing flatus. No increase in abdominal pain. Objective:     Visit Vitals  /88 (BP 1 Location: Right upper arm, BP Patient Position: Supine)   Pulse 67   Temp 98.3 °F (36.8 °C)   Resp 18   Ht 5' 7\" (1.702 m)   Wt 210 lb (95.3 kg)   SpO2 97%   BMI 32.89 kg/m²         Intake/Output Summary (Last 24 hours) at 10/17/2021 0754  Last data filed at 10/16/2021 2115  Gross per 24 hour   Intake    Output 2800 ml   Net -2800 ml        EXAM:  ABD soft, mild LLQ tenderness to palpation, active BS's.  No peritoneal signs       Data Review    Recent Results (from the past 24 hour(s))   METABOLIC PANEL, BASIC    Collection Time: 10/17/21  4:23 AM   Result Value Ref Range    Sodium 140 136 - 145 mmol/L    Potassium 3.9 3.5 - 5.1 mmol/L    Chloride 106 98 - 107 mmol/L    CO2 30 21 - 32 mmol/L    Anion gap 4 (L) 7 - 16 mmol/L    Glucose 120 (H) 65 - 100 mg/dL    BUN 8 6 - 23 MG/DL    Creatinine 1.02 0.8 - 1.5 MG/DL    GFR est AA >60 >60 ml/min/1.73m2    GFR est non-AA >60 >60 ml/min/1.73m2    Calcium 8.9 8.3 - 10.4 MG/DL   CBC W/O DIFF    Collection Time: 10/17/21  4:23 AM   Result Value Ref Range    WBC 10.0 4.3 - 11.1 K/uL    RBC 3.90 (L) 4.23 - 5.6 M/uL    HGB 11.8 (L) 13.6 - 17.2 g/dL    HCT 35.8 (L) 41.1 - 50.3 %    MCV 91.8 79.6 - 97.8 FL    MCH 30.3 26.1 - 32.9 PG    MCHC 33.0 31.4 - 35.0 g/dL    RDW 12.1 11.9 - 14.6 %    PLATELET 639 304 - 399 K/uL    MPV 9.1 (L) 9.4 - 12.3 FL    ABSOLUTE NRBC 0.00 0.0 - 0.2 K/uL        Hospital Problems  Date Reviewed: 10/12/2021        Codes Class Noted POA    Headache ICD-10-CM: R51.9  ICD-9-CM: 784.0  10/15/2021 Yes        * (Principal) Sigmoid diverticulitis ICD-10-CM: L84.07  ICD-9-CM: 562.11  10/14/2021 Yes        Abscess of sigmoid colon due to diverticulitis ICD-10-CM: K57.20  ICD-9-CM: 562.11, 569.5  10/14/2021 Yes LLQ pain ICD-10-CM: R10.32  ICD-9-CM: 789.04  10/14/2021 Yes        Obesity (BMI 30.0-34.9) ICD-10-CM: E66.9  ICD-9-CM: 278.00  10/5/2020 Yes          1. Cipro/Flagyl  2. Bowel rest  3. Medical care. 4. No surgery planned, as per Dr. Rojo Co.   Ramses Negro MD.

## 2021-10-18 LAB
ANION GAP SERPL CALC-SCNC: 6 MMOL/L (ref 7–16)
BUN SERPL-MCNC: 8 MG/DL (ref 6–23)
CALCIUM SERPL-MCNC: 9 MG/DL (ref 8.3–10.4)
CHLORIDE SERPL-SCNC: 105 MMOL/L (ref 98–107)
CO2 SERPL-SCNC: 29 MMOL/L (ref 21–32)
CREAT SERPL-MCNC: 1 MG/DL (ref 0.8–1.5)
ERYTHROCYTE [DISTWIDTH] IN BLOOD BY AUTOMATED COUNT: 12 % (ref 11.9–14.6)
GLUCOSE SERPL-MCNC: 133 MG/DL (ref 65–100)
HCT VFR BLD AUTO: 36.8 % (ref 41.1–50.3)
HGB BLD-MCNC: 12.4 G/DL (ref 13.6–17.2)
MCH RBC QN AUTO: 30.2 PG (ref 26.1–32.9)
MCHC RBC AUTO-ENTMCNC: 33.7 G/DL (ref 31.4–35)
MCV RBC AUTO: 89.8 FL (ref 79.6–97.8)
NRBC # BLD: 0 K/UL (ref 0–0.2)
PLATELET # BLD AUTO: 263 K/UL (ref 150–450)
PMV BLD AUTO: 8.6 FL (ref 9.4–12.3)
POTASSIUM SERPL-SCNC: 3.7 MMOL/L (ref 3.5–5.1)
RBC # BLD AUTO: 4.1 M/UL (ref 4.23–5.6)
SODIUM SERPL-SCNC: 140 MMOL/L (ref 136–145)
WBC # BLD AUTO: 6.3 K/UL (ref 4.3–11.1)

## 2021-10-18 PROCEDURE — 65270000029 HC RM PRIVATE

## 2021-10-18 PROCEDURE — 74011250636 HC RX REV CODE- 250/636: Performed by: SURGERY

## 2021-10-18 PROCEDURE — 99232 SBSQ HOSP IP/OBS MODERATE 35: CPT | Performed by: SURGERY

## 2021-10-18 PROCEDURE — 74011250636 HC RX REV CODE- 250/636: Performed by: INTERNAL MEDICINE

## 2021-10-18 PROCEDURE — 74011250636 HC RX REV CODE- 250/636: Performed by: EMERGENCY MEDICINE

## 2021-10-18 PROCEDURE — 85027 COMPLETE CBC AUTOMATED: CPT

## 2021-10-18 PROCEDURE — 36415 COLL VENOUS BLD VENIPUNCTURE: CPT

## 2021-10-18 PROCEDURE — 80048 BASIC METABOLIC PNL TOTAL CA: CPT

## 2021-10-18 PROCEDURE — 94760 N-INVAS EAR/PLS OXIMETRY 1: CPT

## 2021-10-18 PROCEDURE — 74011000250 HC RX REV CODE- 250: Performed by: SURGERY

## 2021-10-18 RX ADMIN — ONDANSETRON 4 MG: 2 INJECTION INTRAMUSCULAR; INTRAVENOUS at 01:10

## 2021-10-18 RX ADMIN — METRONIDAZOLE 500 MG: 500 INJECTION, SOLUTION INTRAVENOUS at 08:13

## 2021-10-18 RX ADMIN — CIPROFLOXACIN 400 MG: 2 INJECTION, SOLUTION INTRAVENOUS at 16:13

## 2021-10-18 RX ADMIN — FAMOTIDINE 20 MG: 10 INJECTION INTRAVENOUS at 08:12

## 2021-10-18 RX ADMIN — CIPROFLOXACIN 400 MG: 2 INJECTION, SOLUTION INTRAVENOUS at 04:41

## 2021-10-18 RX ADMIN — METRONIDAZOLE 500 MG: 500 INJECTION, SOLUTION INTRAVENOUS at 23:18

## 2021-10-18 RX ADMIN — METRONIDAZOLE 500 MG: 500 INJECTION, SOLUTION INTRAVENOUS at 16:17

## 2021-10-18 RX ADMIN — POTASSIUM CHLORIDE, DEXTROSE MONOHYDRATE AND SODIUM CHLORIDE 100 ML/HR: 150; 5; 450 INJECTION, SOLUTION INTRAVENOUS at 01:08

## 2021-10-18 RX ADMIN — FAMOTIDINE 20 MG: 10 INJECTION INTRAVENOUS at 20:56

## 2021-10-18 RX ADMIN — METRONIDAZOLE 500 MG: 500 INJECTION, SOLUTION INTRAVENOUS at 01:10

## 2021-10-18 NOTE — PROGRESS NOTES
H&P/Consult Note/Progress Note/Office Note:   Anson Dhillon MRN: [id number]  :1965  Age:55 y.o.    HPI: Anson Dhillon is a 50 y.o. male with h/o diverticulitis who came to the ER on 10/14/21   complaining worsening severe LLQ pain that began on 10/8/2021. He reported he was treated with Augmentin  for suspected diverticulitis. Despite the Augmentin his pain worsened. Nothing in particular made his symptoms better. He reported associated nausea and fever. He had a percutaneous drain placed for diverticulitis in . He is s/p [facility] with mesh in   He is s/p colonoscopy on 2017 by Dr. Paris Motley. Surgery was consulted to see the patient by Dr. Andre Wang. 10/14/21 CT abd/pelvis with oral and IV contrast  Hx: LLQ abd pain currently on antibiotics with persistent pain.      LOWER CHEST: Normal.     HEPATOBILIARY: Subtle low-attenuation liver lesions are present most likely cysts and too small to characterize by CT. These may be slightly larger than on the prior exam from 2017. No new liver lesions are appreciated. No calcified gallstones.     PANCREAS: Normal.  SPLEEN: Normal.  ADRENAL GLANDS: Normal.  KIDNEYS/BLADDER: Kidneys and bladder are unremarkable. No urinary tract calculi or hydronephrosis.     BOWEL: No evidence of bowel obstruction. Appendix is normal.   There is marked inflammation and wall thickening in the region of the proximal sigmoid colon with surrounding mesenteric inflammation. There is likely a small intramural fluid collection involving the wall of the colon. No extra colonic fluid collection or free air is evident. Several small probable reactive mesenteric nodes are present in this area.     LYMPH NODES: Small left lower quadrant mesenteric lymph nodes in the area of the colonic inflammation and cortical thickening. Small retroperitoneal nodes are also present but are not enlarged by CT criteria and appear stable.  No enlarged pelvic lymph nodes.     VASCULATURE: Unremarkable. PELVIC ORGANS: Prostate gland and rectum are unremarkable. No free pelvic fluid. MUSCULOSKELETAL: Normal.     IMPRESSION  Colonic inflammation proximal sigmoid colon with probable intramural abscess involving the wall of the sigmoid colon. This measures approximately 1 cm in diameter. No extracolonic abscess or free intraperitoneal air.    Small adjacent reactive lymph nodes are present.         Additional hx:  10/15/21 c/o headache; AF/VSS; LLQ pain; WBC 12.6--->10.4k;  IV Cipro/Flagyl/ Bowel rest  10/16/21 LLQ pain +flatus; IV Abx  10/17/21 LLQ pain better IV Abx  10/18/21 LLQ pain resolved at present; AF; WBC normal; Hold off on TPN/PICC; Repeat CT Wednesday            Past Medical History:   Diagnosis Date    Diverticulosis     H/O seasonal allergies     Unspecified sleep apnea     does not use cpap     Past Surgical History:   Procedure Laterality Date    COLONOSCOPY N/A 12/13/2017    COLONOSCOPY  BMI 31 performed by Jacques Maki MD at [facility] 3 HX ORTHOPAEDIC Right 2009    rolled bicep    WA ABDOMEN SURGERY PROC UNLISTED  2008    perc drain diverticular abscess    [place] COLONOSCOPY FLX DX W/COLLJ SPEC WHEN PFRMD  12/13/2017          Current Facility-Administered Medications   Medication Dose Route Frequency    dextrose 5% - 0.45% NaCl with KCl 20 mEq/L infusion  100 mL/hr IntraVENous CONTINUOUS    ciprofloxacin (CIPRO) 400 mg in D5W IVPB (premix)  400 mg IntraVENous Q12H    metroNIDAZOLE (FLAGYL) IVPB premix 500 mg  500 mg IntraVENous Q8H    sodium chloride (NS) flush 5-40 mL  5-40 mL IntraVENous PRN    acetaminophen (TYLENOL) tablet 650 mg  650 mg Oral Q6H PRN    promethazine (PHENERGAN) tablet 12.5 mg  12.5 mg Oral Q6H PRN    Or    ondansetron (ZOFRAN) injection 4 mg  4 mg IntraVENous Q6H PRN    enoxaparin (LOVENOX) injection 40 mg  40 mg SubCUTAneous Q24H    famotidine (PF) (PEPCID) 20 mg in 0.9% sodium chloride 10 mL injection  20 mg IntraVENous Q12H    morphine injection 2 mg  2 mg IntraVENous Q1H PRN     Darvocet a500 [propoxyphene n-acetaminophen]  Social History     Socioeconomic History    Marital status:      Spouse name: Not on file    Number of children: Not on file    Years of education: Not on file    Highest education level: Not on file   Tobacco Use    Smoking status: Never Smoker    Smokeless tobacco: Never Used   Substance and Sexual Activity    Alcohol use: No    Drug use: No    Sexual activity: Yes     Partners: Female     Social Determinants of Health     Financial Resource Strain:     Difficulty of Paying Living Expenses:    Food Insecurity:     Worried About Running Out of Food in the Last Year:     [address] in the Last Year:    Transportation Needs:     Lack of Transportation (Medical):      Lack of Transportation (Non-Medical):    Physical Activity:     Days of Exercise per Week:     Minutes of Exercise per Session:    Stress:     Feeling of Stress :    Social Connections:     Frequency of Communication with Friends and Family:     Frequency of Social Gatherings with Friends and Family:     Attends [employer] Services:     Active Member of Clubs or Organizations:     Attends Club or Organization Meetings:     Marital Status:      Social History     Tobacco Use   Smoking Status Never Smoker   Smokeless Tobacco Never Used     Family History   Problem Relation Age of Onset    Diabetes Mother    Jolene Balbuena COPD Mother     Kidney Disease Mother     Heart Disease Mother     Hypertension Mother     Cancer Father         prostate    Other Father         abdominal aortic aneurysm    Sleep Apnea Father     Diabetes Maternal Grandmother     Diabetes Sister     Malignant Hyperthermia Neg Hx     Pseudocholinesterase Deficiency Neg Hx     Delayed Awakening Neg Hx     Post-op Nausea/Vomiting Neg Hx     Emergence Delirium Neg Hx     Post-op Cognitive Dysfunction Neg Hx      ROS: The patient has no difficulty with chest pain or shortness of breath. No fever or chills. Comprehensive review of systems was otherwise unremarkable except as noted above. Physical Exam:   Visit Vitals  /71   Pulse 67   Temp 98.1 °F (36.7 °C)   Resp 16   Ht 5' 7\" (1.702 m)   Wt 210 lb (95.3 kg)   SpO2 95%   BMI 32.89 kg/m²     Vitals:    10/18/21 0409 10/18/21 0734 10/18/21 0832 10/18/21 1140   BP: 118/68 136/82  115/71   Pulse: 61 74  67   Resp: 17 16  16   Temp: 98.1 °F (36.7 °C) 98.3 °F (36.8 °C)  98.1 °F (36.7 °C)   SpO2: 95% 96% 95% 95%   Weight:       Height:         No intake/output data recorded. 10/16 1901 - 10/18 0700  In: -   Out: 3500 [Urine:3500]    Constitutional: Alert, oriented, cooperative patient in no acute distress; appears stated age    Eyes:Sclera are clear. EOMs intact  ENMT: no external lesions gross hearing normal; no obvious neck masses, no ear or lip lesions, nares normal  CV: RRR. Normal perfusion  Resp: No JVD. Breathing is  non-labored; no audible wheezing. GI: soft and non-distended; non-tender in LLQ     Musculoskeletal: unremarkable with normal function. No embolic signs or cyanosis.    Neuro:  Oriented; moves all 4; no focal deficits  Psychiatric: normal affect and mood, no memory impairment    Recent vitals (if inpt):  Patient Vitals for the past 24 hrs:   BP Temp Pulse Resp SpO2   10/18/21 1140 115/71 98.1 °F (36.7 °C) 67 16 95 %   10/18/21 0832     95 %   10/18/21 0734 136/82 98.3 °F (36.8 °C) 74 16 96 %   10/18/21 0409 118/68 98.1 °F (36.7 °C) 61 17 95 %   10/17/21 2329 (!) 144/85 98.3 °F (36.8 °C) 67 16 95 %   10/17/21 2038     96 %   10/17/21 2000 (!) 140/80 98.5 °F (36.9 °C) 70 16 93 %   10/17/21 1510 131/75 98.2 °F (36.8 °C) 66 16 94 %       Amount and/or Complexity of Data Reviewed and Analyzed:  I reviewed and analyzed all of the unique labs and radiologic studies that are shown below as well as any that are in the HPI, and any that are in the expanded problem list below  *Each unique test, order, or document contributes to the combination of 2 or combination of 3 in Category 1 below. For this visit I also reviewed old records and prior notes. Recent Labs     10/18/21  0536   WBC 6.3   HGB 12.4*         K 3.7      CO2 29   BUN 8   CREA 1.00   *     Review of most recent CBC  Lab Results   Component Value Date/Time    WBC 6.3 10/18/2021 05:36 AM    HGB 12.4 (L) 10/18/2021 05:36 AM    HCT 36.8 (L) 10/18/2021 05:36 AM    PLATELET 350 98/05/7080 05:36 AM    MCV 89.8 10/18/2021 05:36 AM       Review of most recent BMP  Lab Results   Component Value Date/Time    Sodium 140 10/18/2021 05:36 AM    Potassium 3.7 10/18/2021 05:36 AM    Chloride 105 10/18/2021 05:36 AM    CO2 29 10/18/2021 05:36 AM    Anion gap 6 (L) 10/18/2021 05:36 AM    Glucose 133 (H) 10/18/2021 05:36 AM    BUN 8 10/18/2021 05:36 AM    Creatinine 1.00 10/18/2021 05:36 AM    BUN/Creatinine ratio 15 10/19/2020 07:21 AM    GFR est AA >60 10/18/2021 05:36 AM    GFR est non-AA >60 10/18/2021 05:36 AM    Calcium 9.0 10/18/2021 05:36 AM       Review of most recent LFTs (and lipase if done)  Lab Results   Component Value Date/Time    ALT (SGPT) 32 10/14/2021 03:02 PM    AST (SGOT) 12 (L) 10/14/2021 03:02 PM    Alk.  phosphatase 77 10/14/2021 03:02 PM    Bilirubin, total 0.6 10/14/2021 03:02 PM     Lab Results   Component Value Date/Time    Lipase 56 (L) 10/14/2021 03:02 PM       Lab Results   Component Value Date/Time    INR  05/12/2008 08:55 PM     1.1  Suggested therapeutic INR range:  Venous thrombosis and embolus            2.0-3.0  Prosthetic heart valve                   2.5-3.5    aPTT 30.5  HEPARIN THERAPY RANGE:  48 - 75 SECONDS 05/12/2008 08:55 PM       Review of most recent HgbA1c  No results found for: HBA1C, CVY8CWWS, FSK7RBAT, NDW6XIMY    Nutritional assessment screen for wound healing issues:  Lab Results   Component Value Date/Time    Protein, total 9.1 (H) 10/14/2021 03:02 PM    Albumin 3.8 10/14/2021 03:02 PM       @lastcovr@  XR Results (most recent):  Results from Appointment encounter on 03/31/21    XR KNEE RT MIN 4 V    Narrative  AUTOMATIC ADMINISTRATIVE RESULT    The result for this exam can be found in the Progress note in the chart. Impression  :  See Progress note in the chart. CT Results (most recent):  Results from Hospital Encounter encounter on 10/14/21    CT ABD PELV W CONT    Narrative  CT OF THE ABDOMEN AND PELVIS    INDICATION: Left lower quadrant abdominal pain currently on antibiotics with  persistent pain. Evaluate for corticated diverticulitis. Multiple axial images were obtained through the abdomen and pelvis. Oral  contrast was used for bowel opacification. 100mL of Isovue 370 intravenous  contrast was used for better evaluation of solid organs and vascular structures. Radiation dose reduction techniques were used for this study. All CT scans  performed at this facility use one or all of the following: Automated exposure  control, adjustment of the mA and/or kVp according to patient's size, iterative  reconstruction. COMPARISON: CT abdomen and pelvis 6/7/2017. FINDINGS:  LOWER CHEST: Normal.    HEPATOBILIARY: Subtle low-attenuation liver lesions are present most likely  cysts and too small to characterize by CT. These may be slightly larger than on  the prior exam from 2017. No new liver lesions are appreciated. No calcified  gallstones. PANCREAS: Normal.    SPLEEN: Normal.    ADRENAL GLANDS: Normal.    KIDNEYS/BLADDER: Kidneys and bladder are unremarkable. No urinary tract calculi  or hydronephrosis. BOWEL: No evidence of bowel obstruction. Appendix is normal. There is marked  inflammation and wall thickening in the region of the proximal sigmoid colon  with surrounding mesenteric inflammation. There is likely a small intramural  fluid collection involving the wall of the colon.  No extra colonic fluid  collection or free air is evident. Several small probable reactive mesenteric  nodes are present in this area. LYMPH NODES: Small left lower quadrant mesenteric lymph nodes in the area of the  colonic inflammation and cortical thickening. Small retroperitoneal nodes are  also present but are not enlarged by CT criteria and appear stable. No enlarged  pelvic lymph nodes. VASCULATURE: Unremarkable. PELVIC ORGANS: Prostate gland and rectum are unremarkable. No free pelvic fluid. MUSCULOSKELETAL: Normal.    Impression  Colonic inflammation proximal sigmoid colon with probable intramural  abscess involving the wall of the sigmoid colon. This measures approximately 1  cm in diameter. No extracolonic abscess or free intraperitoneal air. Small  adjacent reactive lymph nodes are present. US Results (most recent):  No results found for this or any previous visit.         Admission date (for inpatients): 10/14/2021   * No surgery found *  * No surgery found *        ASSESSMENT/PLAN:  Problem List  Date Reviewed: 10/12/2021        Codes Class Noted    Headache ICD-10-CM: R51.9  ICD-9-CM: 784.0  10/15/2021        * (Principal) Sigmoid diverticulitis ICD-10-CM: K57.32  ICD-9-CM: 562.11  10/14/2021        Abscess of sigmoid colon due to diverticulitis ICD-10-CM: K57.20  ICD-9-CM: 562.11, 569.5  10/14/2021        LLQ pain ICD-10-CM: R10.32  ICD-9-CM: 789.04  10/14/2021        Obesity (BMI 30.0-34.9) ICD-10-CM: E66.9  ICD-9-CM: 278.00  10/5/2020        Transient disorder of initiating or maintaining sleep ICD-10-CM: F51.02  ICD-9-CM: 307.41  10/5/2020        Hyperlipemia ICD-10-CM: E78.5  ICD-9-CM: 272.4  8/6/2014        FRENCH on CPAP ICD-10-CM: G47.33, Z99.89  ICD-9-CM: 327.23, V46.8  8/6/2014        HA (headache) ICD-10-CM: R51.9  ICD-9-CM: 784.0  8/6/2014        FH: prostate cancer ICD-10-CM: Z80.42  ICD-9-CM: V16.42  8/6/2014        FHx: AAA ICD-10-CM: VKS0063  ICD-9-CM: EJB1812  8/6/2014        Allergic rhinitis ICD-10-CM: J30.9  ICD-9-CM: 477.9  8/6/2014        Diverticulosis of colon without diverticulitis ICD-10-CM: K57.30  ICD-9-CM: 562.10  8/6/2014            Principal Problem:    Sigmoid diverticulitis (10/14/2021)    Active Problems:    Obesity (BMI 30.0-34.9) (10/5/2020)      Abscess of sigmoid colon due to diverticulitis (10/14/2021)      LLQ pain (10/14/2021)      Headache (10/15/2021)           Number and Complexity of Problems addressed and   Risks of complications and/or morbidity of management        Acute diverticulitis with suspected 1cm intramural abscess in sigmoid colon  Continue inpt admission  He failed outpt Rx with Augmentin    NPO  IV fluids  IV Cipro/Flagyl  Serial exams    Monitor for complications  Continue home CPAP regiment      Hold off on PICC/TPN given he is pain-free  Repeat CT Wednesday ordered  If OK, start clears Wednesday afternoon and home Thursday if no new issues on PO ABx        Headache-->resolved  Refractory to morphine given earlier  Tylenol 1000mg PO now + Ibuprofen 600 mg PO now  If headache issues persist, I will consider reconsulting hospitalist or neurology            Level of MDM (2/3 elements below)  Number and Complexity of Problems Addressed Amount and/or Complexity of Data to be Reviewed and Analyzed  *Each unique test, order, or document contributes to the combination of 2 or combination of 3 in Category 1 below.  Risk of Complications and/or Morbidity or Mortality of pt Management     07726  94145 SF Minimal  1self-limited or minor problem Minimal or none Minimal risk of morbidity from additional diagnostic testing or Rx   56377  79104 Low Low  2or more self-limited or minor problems;    or  1stable chronic illness;    or  0QPJIH, uncomplicated illness or injury   Limited  (Must meet the requirements of at least 1 of the 2 categories)  Category 1: Tests and documents   Any combination of 2 from the following:  Review of prior external note(s) from each unique source*;  review of the result(s) of each unique test*;   ordering of each unique test*    or   Category 2: Assessment requiring an independent historian(s)  (For the categories of independent interpretation of tests and discussion of management or test interpretation, see moderate or high) Low risk of morbidity from additional diagnostic testing or treatment     32050  99347 Mod Moderate  1or more chronic illnesses with exacerbation, progression, or side effects of treatment;    or  2or more stable chronic illnesses;    or  1undiagnosed new problem with uncertain prognosis;    or  1acute illness with systemic symptoms;    or  4TRHJZ complicated injury   Moderate  (Must meet the requirements of at least 1 out of 3 categories)  Category 1: Tests, documents, or independent historian(s)  Any combination of 3 from the following:   Review of prior external note(s) from each unique source*;  Review of the result(s) of each unique test*;  Ordering of each unique test*;  Assessment requiring an independent historian(s)    or  Category 2: Independent interpretation of tests   Independent interpretation of a test performed by another physician/other qualified health care professional (not separately reported);     or  Category 3: Discussion of management or test interpretation  Discussion of management or test interpretation with external physician/other qualified health care professional/appropriate source (not separately reported)   Moderate risk of morbidity from additional diagnostic testing or treatment  Examples only:  Prescription drug management   Decision regarding minor surgery with identified patient or procedure risk factors  Decision regarding elective major surgery without identified patient or procedure risk factors   Diagnosis or treatment significantly limited by social determinants of health       31517 42590 High High  1or more chronic illnesses with severe exacerbation, progression, or side effects of treatment;    or  1 acute or chronic illness or injury that poses a threat to life or bodily function   Extensive  (Must meet the requirements of at least 2 out of 3 categories)  Category 1: Tests, documents, or independent historian(s)  Any combination of 3 from the following:   Review of prior external note(s) from each unique source*;  Review of the result(s) of each unique test*;   Ordering of each unique test*;   Assessment requiring an independent historian(s)    or   Category 2: Independent interpretation of tests   Independent interpretation of a test performed by another physician/other qualified health care professional (not separately reported);     or  Category 3: Discussion of management or test interpretation  Discussion of management or test interpretation with external physician/other qualified health care professional/appropriate source (not separately reported)   High risk of morbidity from additional diagnostic testing or treatment  Examples only:  Drug therapy requiring intensive monitoring for toxicity  Decision regarding elective major surgery with identified patient or procedure risk factors  Decision regarding emergency major surgery  Decision regarding hospitalization  Decision not to resuscitate or to de-escalate care because of poor prognosis             I have personally performed a face-to-face diagnostic evaluation and management  service on this patient. I have independently seen the patient. I have independently obtained the above history from the patient/family. I have independently examined the patient with above findings. I have independently reviewed data/labs for this patient and developed the above plan of care (MDM). Signed: Jurgen Thornton.  Rosie Chatterjee MD, FACS

## 2021-10-18 NOTE — PROGRESS NOTES
Problem: Nutrition Deficit  Goal: *Optimize nutritional status  Outcome: Progressing Towards Goal     Problem: Patient Education: Go to Patient Education Activity  Goal: Patient/Family Education  Outcome: Progressing Towards Goal     Problem: Pain  Goal: *Control of Pain  Outcome: Progressing Towards Goal  Goal: *PALLIATIVE CARE:  Alleviation of Pain  Outcome: Progressing Towards Goal     Problem: Patient Education: Go to Patient Education Activity  Goal: Patient/Family Education  Outcome: Progressing Towards Goal     Problem: Nausea/Vomiting (Adult)  Goal: *Absence of nausea/vomiting  Outcome: Progressing Towards Goal  Goal: *Palliation of nausea/vomiting (Palliative Care)  Outcome: Progressing Towards Goal     Problem: Patient Education: Go to Patient Education Activity  Goal: Patient/Family Education  Outcome: Progressing Towards Goal     Problem: Falls - Risk of  Goal: *Absence of Falls  Description: Document Sita Fall Risk and appropriate interventions in the flowsheet.   Outcome: Progressing Towards Goal  Note: Fall Risk Interventions:            Medication Interventions: Teach patient to arise slowly         History of Falls Interventions: Bed/chair exit alarm, Room close to nurse's station, Investigate reason for fall         Problem: Patient Education: Go to Patient Education Activity  Goal: Patient/Family Education  Outcome: Progressing Towards Goal

## 2021-10-19 LAB
BACTERIA SPEC CULT: NORMAL
BACTERIA SPEC CULT: NORMAL
SERVICE CMNT-IMP: NORMAL
SERVICE CMNT-IMP: NORMAL

## 2021-10-19 PROCEDURE — 74011250636 HC RX REV CODE- 250/636: Performed by: SURGERY

## 2021-10-19 PROCEDURE — 74011000250 HC RX REV CODE- 250: Performed by: SURGERY

## 2021-10-19 PROCEDURE — 2709999900 HC NON-CHARGEABLE SUPPLY

## 2021-10-19 PROCEDURE — 99232 SBSQ HOSP IP/OBS MODERATE 35: CPT | Performed by: PHYSICIAN ASSISTANT

## 2021-10-19 PROCEDURE — 74011250636 HC RX REV CODE- 250/636: Performed by: EMERGENCY MEDICINE

## 2021-10-19 PROCEDURE — 65270000029 HC RM PRIVATE

## 2021-10-19 RX ADMIN — METRONIDAZOLE 500 MG: 500 INJECTION, SOLUTION INTRAVENOUS at 22:23

## 2021-10-19 RX ADMIN — FAMOTIDINE 20 MG: 10 INJECTION INTRAVENOUS at 08:02

## 2021-10-19 RX ADMIN — METRONIDAZOLE 500 MG: 500 INJECTION, SOLUTION INTRAVENOUS at 17:40

## 2021-10-19 RX ADMIN — FAMOTIDINE 20 MG: 10 INJECTION INTRAVENOUS at 20:57

## 2021-10-19 RX ADMIN — CIPROFLOXACIN 400 MG: 2 INJECTION, SOLUTION INTRAVENOUS at 04:31

## 2021-10-19 RX ADMIN — CIPROFLOXACIN 400 MG: 2 INJECTION, SOLUTION INTRAVENOUS at 17:40

## 2021-10-19 RX ADMIN — POTASSIUM CHLORIDE, DEXTROSE MONOHYDRATE AND SODIUM CHLORIDE 100 ML/HR: 150; 5; 450 INJECTION, SOLUTION INTRAVENOUS at 22:29

## 2021-10-19 RX ADMIN — POTASSIUM CHLORIDE, DEXTROSE MONOHYDRATE AND SODIUM CHLORIDE 100 ML/HR: 150; 5; 450 INJECTION, SOLUTION INTRAVENOUS at 00:25

## 2021-10-19 RX ADMIN — METRONIDAZOLE 500 MG: 500 INJECTION, SOLUTION INTRAVENOUS at 08:02

## 2021-10-19 NOTE — PROGRESS NOTES
H&P/Consult Note/Progress Note/Office Note:   Kervin Varma MRN: [id number]  :1965  Age:55 y.o.    HPI: Kervin Varma is a 50 y.o. male with h/o diverticulitis who came to the ER on 10/14/21   complaining worsening severe LLQ pain that began on 10/8/2021. He reported he was treated with Augmentin  for suspected diverticulitis. Despite the Augmentin his pain worsened. Nothing in particular made his symptoms better. He reported associated nausea and fever. He had a percutaneous drain placed for diverticulitis in . He is s/p [facility] with mesh in   He is s/p colonoscopy on 2017 by Dr. Adamaris Concepcion. Surgery was consulted to see the patient by Dr. Karol Sutherland. 10/14/21 CT abd/pelvis with oral and IV contrast  Hx: LLQ abd pain currently on antibiotics with persistent pain.      LOWER CHEST: Normal.     HEPATOBILIARY: Subtle low-attenuation liver lesions are present most likely cysts and too small to characterize by CT. These may be slightly larger than on the prior exam from 2017. No new liver lesions are appreciated. No calcified gallstones.     PANCREAS: Normal.  SPLEEN: Normal.  ADRENAL GLANDS: Normal.  KIDNEYS/BLADDER: Kidneys and bladder are unremarkable. No urinary tract calculi or hydronephrosis.     BOWEL: No evidence of bowel obstruction. Appendix is normal.   There is marked inflammation and wall thickening in the region of the proximal sigmoid colon with surrounding mesenteric inflammation. There is likely a small intramural fluid collection involving the wall of the colon. No extra colonic fluid collection or free air is evident. Several small probable reactive mesenteric nodes are present in this area.     LYMPH NODES: Small left lower quadrant mesenteric lymph nodes in the area of the colonic inflammation and cortical thickening. Small retroperitoneal nodes are also present but are not enlarged by CT criteria and appear stable.  No enlarged pelvic lymph nodes.     VASCULATURE: Unremarkable. PELVIC ORGANS: Prostate gland and rectum are unremarkable. No free pelvic fluid. MUSCULOSKELETAL: Normal.     IMPRESSION  Colonic inflammation proximal sigmoid colon with probable intramural abscess involving the wall of the sigmoid colon. This measures approximately 1 cm in diameter. No extracolonic abscess or free intraperitoneal air.    Small adjacent reactive lymph nodes are present.         Additional hx:  10/15/21 c/o headache; AF/VSS; LLQ pain; WBC 12.6--->10.4k;  IV Cipro/Flagyl/ Bowel rest  10/16/21 LLQ pain +flatus; IV Abx  10/17/21 LLQ pain better IV Abx  10/18/21 LLQ pain resolved at present; AF; WBC normal; Hold off on TPN/PICC; Repeat CT Wednesday  10/19/21 No change- LLQ pain resolved; repeat CT ordered for  Wednesday          Past Medical History:   Diagnosis Date    Diverticulosis     H/O seasonal allergies     Unspecified sleep apnea     does not use cpap     Past Surgical History:   Procedure Laterality Date    COLONOSCOPY N/A 12/13/2017    COLONOSCOPY  BMI 31 performed by Manav Dobson MD at [facility] 3 HX ORTHOPAEDIC Right 2009    rolled bicep    AK ABDOMEN SURGERY PROC UNLISTED  2008    perc drain diverticular abscess    AK COLONOSCOPY FLX DX W/COLLJ SPEC WHEN PFRMD  12/13/2017          Current Facility-Administered Medications   Medication Dose Route Frequency    dextrose 5% - 0.45% NaCl with KCl 20 mEq/L infusion  100 mL/hr IntraVENous CONTINUOUS    ciprofloxacin (CIPRO) 400 mg in D5W IVPB (premix)  400 mg IntraVENous Q12H    metroNIDAZOLE (FLAGYL) IVPB premix 500 mg  500 mg IntraVENous Q8H    sodium chloride (NS) flush 5-40 mL  5-40 mL IntraVENous PRN    acetaminophen (TYLENOL) tablet 650 mg  650 mg Oral Q6H PRN    promethazine (PHENERGAN) tablet 12.5 mg  12.5 mg Oral Q6H PRN    Or    ondansetron (ZOFRAN) injection 4 mg  4 mg IntraVENous Q6H PRN    enoxaparin (LOVENOX) injection 40 mg  40 mg SubCUTAneous Q24H    famotidine (PF) (PEPCID) 20 mg in 0.9% sodium chloride 10 mL injection  20 mg IntraVENous Q12H    morphine injection 2 mg  2 mg IntraVENous Q1H PRN     Darvocet a500 [propoxyphene n-acetaminophen]  Social History     Socioeconomic History    Marital status:      Spouse name: Not on file    Number of children: Not on file    Years of education: Not on file    Highest education level: Not on file   Tobacco Use    Smoking status: Never Smoker    Smokeless tobacco: Never Used   Substance and Sexual Activity    Alcohol use: No    Drug use: No    Sexual activity: Yes     Partners: Female     Social Determinants of Health     Financial Resource Strain:     Difficulty of Paying Living Expenses:    Food Insecurity:     Worried About Running Out of Food in the Last Year:     Ran Out of Food in the Last Year:    Transportation Needs:     Lack of Transportation (Medical):      Lack of Transportation (Non-Medical):    Physical Activity:     Days of Exercise per Week:     Minutes of Exercise per Session:    Stress:     Feeling of Stress :    Social Connections:     Frequency of Communication with Friends and Family:     Frequency of Social Gatherings with Friends and Family:     Attends [employer] Services:     Active Member of Clubs or Organizations:     Attends Club or Organization Meetings:     Marital Status:      Social History     Tobacco Use   Smoking Status Never Smoker   Smokeless Tobacco Never Used     Family History   Problem Relation Age of Onset    Diabetes Mother    Lindsey COPD Mother     Kidney Disease Mother     Heart Disease Mother     Hypertension Mother     Cancer Father         prostate    Other Father         abdominal aortic aneurysm    Sleep Apnea Father     Diabetes Maternal Grandmother     Diabetes Sister     Malignant Hyperthermia Neg Hx     Pseudocholinesterase Deficiency Neg Hx     Delayed Awakening Neg Hx     Post-op Nausea/Vomiting Neg Hx     Emergence Delirium Neg Hx     Post-op Cognitive Dysfunction Neg Hx      ROS: The patient has no difficulty with chest pain or shortness of breath. No fever or chills. Comprehensive review of systems was otherwise unremarkable except as noted above. Physical Exam:   Visit Vitals  BP (!) 114/53   Pulse 62   Temp 98.3 °F (36.8 °C)   Resp 18   Ht 5' 7\" (1.702 m)   Wt 210 lb (95.3 kg)   SpO2 95%   BMI 32.89 kg/m²     Vitals:    10/18/21 2130 10/18/21 2350 10/19/21 0500 10/19/21 0729   BP: 139/64 119/69 133/76 (!) 114/53   Pulse: 79 72 62 62   Resp: 18 17 17 18   Temp: 98.4 °F (36.9 °C) 98 °F (36.7 °C) 97.7 °F (36.5 °C) 98.3 °F (36.8 °C)   SpO2: 97% 98% 97% 95%   Weight:       Height:         No intake/output data recorded. 10/17 1901 - 10/19 0700  In: -   Out: 2925 [Urine:2925]    Constitutional: Alert, oriented, cooperative patient in no acute distress; appears stated age    Eyes:Sclera are clear. EOMs intact  ENMT: no external lesions gross hearing normal; no obvious neck masses, no ear or lip lesions, nares normal  CV: RRR. Normal perfusion  Resp: No JVD. Breathing is  non-labored; no audible wheezing. GI: soft and non-distended; non-tender in LLQ:+BS     Musculoskeletal: unremarkable with normal function. No embolic signs or cyanosis.    Neuro:  Oriented; moves all 4; no focal deficits  Psychiatric: normal affect and mood, no memory impairment    Recent vitals (if inpt):  Patient Vitals for the past 24 hrs:   BP Temp Pulse Resp SpO2   10/19/21 0729 (!) 114/53 98.3 °F (36.8 °C) 62 18 95 %   10/19/21 0500 133/76 97.7 °F (36.5 °C) 62 17 97 %   10/18/21 2350 119/69 98 °F (36.7 °C) 72 17 98 %   10/18/21 2130 139/64 98.4 °F (36.9 °C) 79 18 97 %   10/18/21 1548 133/87 98.1 °F (36.7 °C) 64 16 94 %   10/18/21 1140 115/71 98.1 °F (36.7 °C) 67 16 95 %       Amount and/or Complexity of Data Reviewed and Analyzed:  I reviewed and analyzed all of the unique labs and radiologic studies that are shown below as well as any that are in the HPI, and any that are in the expanded problem list below  *Each unique test, order, or document contributes to the combination of 2 or combination of 3 in Category 1 below. For this visit I also reviewed old records and prior notes. Recent Labs     10/18/21  0536   WBC 6.3   HGB 12.4*         K 3.7      CO2 29   BUN 8   CREA 1.00   *     Review of most recent CBC  Lab Results   Component Value Date/Time    WBC 6.3 10/18/2021 05:36 AM    HGB 12.4 (L) 10/18/2021 05:36 AM    HCT 36.8 (L) 10/18/2021 05:36 AM    PLATELET 129 80/70/5139 05:36 AM    MCV 89.8 10/18/2021 05:36 AM       Review of most recent BMP  Lab Results   Component Value Date/Time    Sodium 140 10/18/2021 05:36 AM    Potassium 3.7 10/18/2021 05:36 AM    Chloride 105 10/18/2021 05:36 AM    CO2 29 10/18/2021 05:36 AM    Anion gap 6 (L) 10/18/2021 05:36 AM    Glucose 133 (H) 10/18/2021 05:36 AM    BUN 8 10/18/2021 05:36 AM    Creatinine 1.00 10/18/2021 05:36 AM    BUN/Creatinine ratio 15 10/19/2020 07:21 AM    GFR est AA >60 10/18/2021 05:36 AM    GFR est non-AA >60 10/18/2021 05:36 AM    Calcium 9.0 10/18/2021 05:36 AM       Review of most recent LFTs (and lipase if done)  Lab Results   Component Value Date/Time    ALT (SGPT) 32 10/14/2021 03:02 PM    AST (SGOT) 12 (L) 10/14/2021 03:02 PM    Alk.  phosphatase 77 10/14/2021 03:02 PM    Bilirubin, total 0.6 10/14/2021 03:02 PM     Lab Results   Component Value Date/Time    Lipase 56 (L) 10/14/2021 03:02 PM       Lab Results   Component Value Date/Time    INR  05/12/2008 08:55 PM     1.1  Suggested therapeutic INR range:  Venous thrombosis and embolus            2.0-3.0  Prosthetic heart valve                   2.5-3.5    aPTT 30.5  HEPARIN THERAPY RANGE:  48 - 75 SECONDS 05/12/2008 08:55 PM       Review of most recent HgbA1c  No results found for: HBA1C, VGY3CVRM, YWE8FXSI, KNG2EFFJ    Nutritional assessment screen for wound healing issues:  Lab Results   Component Value Date/Time    Protein, total 9.1 (H) 10/14/2021 03:02 PM    Albumin 3.8 10/14/2021 03:02 PM       @lastcovr@  XR Results (most recent):  Results from Appointment encounter on 03/31/21    XR KNEE RT MIN 4 V    Narrative  AUTOMATIC ADMINISTRATIVE RESULT    The result for this exam can be found in the Progress note in the chart. Impression  :  See Progress note in the chart. CT Results (most recent):  Results from Hospital Encounter encounter on 10/14/21    CT ABD PELV W CONT    Narrative  CT OF THE ABDOMEN AND PELVIS    INDICATION: Left lower quadrant abdominal pain currently on antibiotics with  persistent pain. Evaluate for corticated diverticulitis. Multiple axial images were obtained through the abdomen and pelvis. Oral  contrast was used for bowel opacification. 100mL of Isovue 370 intravenous  contrast was used for better evaluation of solid organs and vascular structures. Radiation dose reduction techniques were used for this study. All CT scans  performed at this facility use one or all of the following: Automated exposure  control, adjustment of the mA and/or kVp according to patient's size, iterative  reconstruction. COMPARISON: CT abdomen and pelvis 6/7/2017. FINDINGS:  LOWER CHEST: Normal.    HEPATOBILIARY: Subtle low-attenuation liver lesions are present most likely  cysts and too small to characterize by CT. These may be slightly larger than on  the prior exam from 2017. No new liver lesions are appreciated. No calcified  gallstones. PANCREAS: Normal.    SPLEEN: Normal.    ADRENAL GLANDS: Normal.    KIDNEYS/BLADDER: Kidneys and bladder are unremarkable. No urinary tract calculi  or hydronephrosis. BOWEL: No evidence of bowel obstruction. Appendix is normal. There is marked  inflammation and wall thickening in the region of the proximal sigmoid colon  with surrounding mesenteric inflammation. There is likely a small intramural  fluid collection involving the wall of the colon.  No extra colonic fluid  collection or free air is evident. Several small probable reactive mesenteric  nodes are present in this area. LYMPH NODES: Small left lower quadrant mesenteric lymph nodes in the area of the  colonic inflammation and cortical thickening. Small retroperitoneal nodes are  also present but are not enlarged by CT criteria and appear stable. No enlarged  pelvic lymph nodes. VASCULATURE: Unremarkable. PELVIC ORGANS: Prostate gland and rectum are unremarkable. No free pelvic fluid. MUSCULOSKELETAL: Normal.    Impression  Colonic inflammation proximal sigmoid colon with probable intramural  abscess involving the wall of the sigmoid colon. This measures approximately 1  cm in diameter. No extracolonic abscess or free intraperitoneal air. Small  adjacent reactive lymph nodes are present. US Results (most recent):  No results found for this or any previous visit.         Admission date (for inpatients): 10/14/2021   * No surgery found *  * No surgery found *        ASSESSMENT/PLAN:  Problem List  Date Reviewed: 10/12/2021        Codes Class Noted    Headache ICD-10-CM: R51.9  ICD-9-CM: 784.0  10/15/2021        * (Principal) Sigmoid diverticulitis ICD-10-CM: K57.32  ICD-9-CM: 562.11  10/14/2021        Abscess of sigmoid colon due to diverticulitis ICD-10-CM: K57.20  ICD-9-CM: 562.11, 569.5  10/14/2021        LLQ pain ICD-10-CM: R10.32  ICD-9-CM: 789.04  10/14/2021        Obesity (BMI 30.0-34.9) ICD-10-CM: E66.9  ICD-9-CM: 278.00  10/5/2020        Transient disorder of initiating or maintaining sleep ICD-10-CM: F51.02  ICD-9-CM: 307.41  10/5/2020        Hyperlipemia ICD-10-CM: E78.5  ICD-9-CM: 272.4  8/6/2014        FRENHC on CPAP ICD-10-CM: G47.33, Z99.89  ICD-9-CM: 327.23, V46.8  8/6/2014        HA (headache) ICD-10-CM: R51.9  ICD-9-CM: 784.0  8/6/2014        FH: prostate cancer ICD-10-CM: Z80.42  ICD-9-CM: V16.42  8/6/2014        FHx: AAA ICD-10-CM: Barrett Manuel  ICD-9-CM: Barrett Manuel  8/6/2014 Allergic rhinitis ICD-10-CM: J30.9  ICD-9-CM: 477.9  8/6/2014        Diverticulosis of colon without diverticulitis ICD-10-CM: K57.30  ICD-9-CM: 562.10  8/6/2014            Principal Problem:    Sigmoid diverticulitis (10/14/2021)    Active Problems:    Obesity (BMI 30.0-34.9) (10/5/2020)      Abscess of sigmoid colon due to diverticulitis (10/14/2021)      LLQ pain (10/14/2021)      Headache (10/15/2021)           Number and Complexity of Problems addressed and   Risks of complications and/or morbidity of management        Acute diverticulitis with suspected 1cm intramural abscess in sigmoid colon  Continue inpt admission  He failed outpt Rx with Augmentin    NPO  IV fluids  IV Cipro/Flagyl  Serial exams    Monitor for complications  Continue home CPAP regiment      Hold off on PICC/TPN given he is pain-free  Repeat CT Wednesday ordered  If OK, start clears Wednesday afternoon and home Thursday if no new issues on PO ABx        Headache-->resolved  Refractory to morphine given earlier  Tylenol 1000mg PO now + Ibuprofen 600 mg PO now  If headache issues persist, I will consider reconsulting hospitalist or neurology            Level of MDM (2/3 elements below)  Number and Complexity of Problems Addressed Amount and/or Complexity of Data to be Reviewed and Analyzed  *Each unique test, order, or document contributes to the combination of 2 or combination of 3 in Category 1 below.  Risk of Complications and/or Morbidity or Mortality of pt Management     31844  45327 SF Minimal  1self-limited or minor problem Minimal or none Minimal risk of morbidity from additional diagnostic testing or Rx   03365  77273 Low Low  2or more self-limited or minor problems;    or  1stable chronic illness;    or  6WAENX, uncomplicated illness or injury   Limited  (Must meet the requirements of at least 1 of the 2 categories)  Category 1: Tests and documents   Any combination of 2 from the following:  Review of prior external note(s) from each unique source*;  review of the result(s) of each unique test*;   ordering of each unique test*    or   Category 2: Assessment requiring an independent historian(s)  (For the categories of independent interpretation of tests and discussion of management or test interpretation, see moderate or high) Low risk of morbidity from additional diagnostic testing or treatment     79124  82625 Mod Moderate  1or more chronic illnesses with exacerbation, progression, or side effects of treatment;    or  2or more stable chronic illnesses;    or  1undiagnosed new problem with uncertain prognosis;    or  1acute illness with systemic symptoms;    or  6XUFFS complicated injury   Moderate  (Must meet the requirements of at least 1 out of 3 categories)  Category 1: Tests, documents, or independent historian(s)  Any combination of 3 from the following:   Review of prior external note(s) from each unique source*;  Review of the result(s) of each unique test*;  Ordering of each unique test*;  Assessment requiring an independent historian(s)    or  Category 2: Independent interpretation of tests   Independent interpretation of a test performed by another physician/other qualified health care professional (not separately reported);     or  Category 3: Discussion of management or test interpretation  Discussion of management or test interpretation with external physician/other qualified health care professional/appropriate source (not separately reported)   Moderate risk of morbidity from additional diagnostic testing or treatment  Examples only:  Prescription drug management   Decision regarding minor surgery with identified patient or procedure risk factors  Decision regarding elective major surgery without identified patient or procedure risk factors   Diagnosis or treatment significantly limited by social determinants of health       [id number] High High  1or more chronic illnesses with severe exacerbation, progression, or side effects of treatment;    or  1 acute or chronic illness or injury that poses a threat to life or bodily function   Extensive  (Must meet the requirements of at least 2 out of 3 categories)  Category 1: Tests, documents, or independent historian(s)  Any combination of 3 from the following:   Review of prior external note(s) from each unique source*;  Review of the result(s) of each unique test*;   Ordering of each unique test*;   Assessment requiring an independent historian(s)    or   Category 2: Independent interpretation of tests   Independent interpretation of a test performed by another physician/other qualified health care professional (not separately reported);     or  Category 3: Discussion of management or test interpretation  Discussion of management or test interpretation with external physician/other qualified health care professional/appropriate source (not separately reported)   High risk of morbidity from additional diagnostic testing or treatment  Examples only:  Drug therapy requiring intensive monitoring for toxicity  Decision regarding elective major surgery with identified patient or procedure risk factors  Decision regarding emergency major surgery  Decision regarding hospitalization  Decision not to resuscitate or to de-escalate care because of poor prognosis           ALMAZ Irby

## 2021-10-20 ENCOUNTER — APPOINTMENT (OUTPATIENT)
Dept: CT IMAGING | Age: 56
DRG: 392 | End: 2021-10-20
Attending: SURGERY
Payer: COMMERCIAL

## 2021-10-20 LAB
ANION GAP SERPL CALC-SCNC: 2 MMOL/L (ref 7–16)
BUN SERPL-MCNC: 9 MG/DL (ref 6–23)
CALCIUM SERPL-MCNC: 9.5 MG/DL (ref 8.3–10.4)
CHLORIDE SERPL-SCNC: 104 MMOL/L (ref 98–107)
CO2 SERPL-SCNC: 30 MMOL/L (ref 21–32)
CREAT SERPL-MCNC: 1.05 MG/DL (ref 0.8–1.5)
GLUCOSE SERPL-MCNC: 102 MG/DL (ref 65–100)
POTASSIUM SERPL-SCNC: 4.8 MMOL/L (ref 3.5–5.1)
SODIUM SERPL-SCNC: 136 MMOL/L (ref 136–145)

## 2021-10-20 PROCEDURE — C1751 CATH, INF, PER/CENT/MIDLINE: HCPCS

## 2021-10-20 PROCEDURE — 74011250636 HC RX REV CODE- 250/636: Performed by: EMERGENCY MEDICINE

## 2021-10-20 PROCEDURE — 36415 COLL VENOUS BLD VENIPUNCTURE: CPT

## 2021-10-20 PROCEDURE — 74011250636 HC RX REV CODE- 250/636: Performed by: SURGERY

## 2021-10-20 PROCEDURE — 3E0436Z INTRODUCTION OF NUTRITIONAL SUBSTANCE INTO CENTRAL VEIN, PERCUTANEOUS APPROACH: ICD-10-PCS | Performed by: SURGERY

## 2021-10-20 PROCEDURE — 80048 BASIC METABOLIC PNL TOTAL CA: CPT

## 2021-10-20 PROCEDURE — 74011000250 HC RX REV CODE- 250: Performed by: SURGERY

## 2021-10-20 PROCEDURE — 74011000258 HC RX REV CODE- 258: Performed by: SURGERY

## 2021-10-20 PROCEDURE — 2709999900 HC NON-CHARGEABLE SUPPLY

## 2021-10-20 PROCEDURE — 65270000029 HC RM PRIVATE

## 2021-10-20 PROCEDURE — 02HV33Z INSERTION OF INFUSION DEVICE INTO SUPERIOR VENA CAVA, PERCUTANEOUS APPROACH: ICD-10-PCS | Performed by: SURGERY

## 2021-10-20 PROCEDURE — 74177 CT ABD & PELVIS W/CONTRAST: CPT

## 2021-10-20 PROCEDURE — 36573 INSJ PICC RS&I 5 YR+: CPT | Performed by: SURGERY

## 2021-10-20 PROCEDURE — 74011000636 HC RX REV CODE- 636: Performed by: SURGERY

## 2021-10-20 PROCEDURE — 99232 SBSQ HOSP IP/OBS MODERATE 35: CPT | Performed by: SURGERY

## 2021-10-20 RX ORDER — SODIUM CHLORIDE 0.9 % (FLUSH) 0.9 %
10 SYRINGE (ML) INJECTION
Status: COMPLETED | OUTPATIENT
Start: 2021-10-20 | End: 2021-10-20

## 2021-10-20 RX ORDER — SODIUM CHLORIDE 0.9 % (FLUSH) 0.9 %
20 SYRINGE (ML) INJECTION EVERY 8 HOURS
Status: DISCONTINUED | OUTPATIENT
Start: 2021-10-20 | End: 2021-10-22 | Stop reason: HOSPADM

## 2021-10-20 RX ORDER — SODIUM CHLORIDE 0.9 % (FLUSH) 0.9 %
20 SYRINGE (ML) INJECTION AS NEEDED
Status: DISCONTINUED | OUTPATIENT
Start: 2021-10-20 | End: 2021-10-22 | Stop reason: HOSPADM

## 2021-10-20 RX ADMIN — METRONIDAZOLE 500 MG: 500 INJECTION, SOLUTION INTRAVENOUS at 09:30

## 2021-10-20 RX ADMIN — FAMOTIDINE 20 MG: 10 INJECTION INTRAVENOUS at 09:30

## 2021-10-20 RX ADMIN — DIATRIZOATE MEGLUMINE AND DIATRIZOATE SODIUM 15 ML: 660; 100 LIQUID ORAL; RECTAL at 09:29

## 2021-10-20 RX ADMIN — SODIUM CHLORIDE 100 ML: 900 INJECTION, SOLUTION INTRAVENOUS at 10:46

## 2021-10-20 RX ADMIN — Medication 10 ML: at 10:46

## 2021-10-20 RX ADMIN — POTASSIUM CHLORIDE, DEXTROSE MONOHYDRATE AND SODIUM CHLORIDE 100 ML/HR: 150; 5; 450 INJECTION, SOLUTION INTRAVENOUS at 20:31

## 2021-10-20 RX ADMIN — POTASSIUM CHLORIDE, DEXTROSE MONOHYDRATE AND SODIUM CHLORIDE 100 ML/HR: 150; 5; 450 INJECTION, SOLUTION INTRAVENOUS at 14:37

## 2021-10-20 RX ADMIN — IOPAMIDOL 100 ML: 755 INJECTION, SOLUTION INTRAVENOUS at 10:46

## 2021-10-20 RX ADMIN — CIPROFLOXACIN 400 MG: 2 INJECTION, SOLUTION INTRAVENOUS at 17:27

## 2021-10-20 RX ADMIN — FAMOTIDINE 20 MG: 10 INJECTION INTRAVENOUS at 20:30

## 2021-10-20 RX ADMIN — METRONIDAZOLE 500 MG: 500 INJECTION, SOLUTION INTRAVENOUS at 16:36

## 2021-10-20 RX ADMIN — CIPROFLOXACIN 400 MG: 2 INJECTION, SOLUTION INTRAVENOUS at 04:12

## 2021-10-20 NOTE — PROGRESS NOTES
PICC Placement Note    PRE-PROCEDURE VERIFICATION  Correct Procedure: yes. Time out completed with assistant Seven Garcia rn and all persons present in agreement with time out. Correct Site:  yes  Temperature: Temp: 98.1 °F (36.7 °C), Temperature Source: Temp Source: Oral  Recent Labs     10/20/21  0842 10/18/21  0536 10/18/21  0536   BUN 9   < > 8   CREA 1.05   < > 1.00   PLT  --   --  263   WBC  --   --  6.3    < > = values in this interval not displayed. Allergies: Darvocet a500 [propoxyphene n-acetaminophen]  Education materials for PICC Care given to patient or family. PROCEDURE DETAIL  A double lumen PICC line was started for TPN. The following documentation is in addition to the PICC properties in the lines/airways flowsheet :  Lot #: [id number]  xylocaine used: yes  Mid-Arm Circumference: 35 (cm)  Internal Catheter Length: 41 (cm)  Internal Catheter Total Length: 41 (cm)  Vein Selection for PICC:right brachial  Central Line Bundle followed yes  Complication Related to Insertion: none  Both the insertion guidewire and ECG guidewire were removed intact all ports have positive blood return and were flush well with normal saline. The location of the tip of the PICC is verified using ECG technology. The tip is in the SVC per ECG reading. See image below.      Line is okay to use: yes

## 2021-10-20 NOTE — PROGRESS NOTES
Care Management Interventions  PCP Verified by CM: Yes  Support Systems: Spouse/Significant Other  Discharge Location  Discharge Placement: Home with family assistance  Chart reviewed and per MD pt need PICC line for TPN. Had repeat CT and in need of TPN nutrition. [employer] + aware and checking pt's coverage. CM following. See MD note below for plan of care. ...................   Plan   Repeat CT on 10/20/21 showed sigmoid colon wall abscess 10mm--->now 7mm  PICC/TPN  Ask for case management to get outpt TPN approval  Repeat CT in approx 2 weeks as outpt  Home on PO Augmentin

## 2021-10-20 NOTE — PROGRESS NOTES
H&P/Consult Note/Progress Note/Office Note:   Naima Hdz MRN: [id number]  :1965  Age:55 y.o.    HPI: Naima Hdz is a 50 y.o. male with h/o diverticulitis who came to the ER on 10/14/21   complaining worsening severe LLQ pain that began on 10/8/2021. He reported he was treated with Augmentin  for suspected diverticulitis. Despite the Augmentin his pain worsened. Nothing in particular made his symptoms better. He reported associated nausea and fever. He had a percutaneous drain placed for diverticuliti in . He is s/p [facility] with mesh in   He is s/p colonoscopy on 2017 by Dr. Kavon Interiano. Surgery was consulted to see the patient by Dr. Susie Smith. 10/14/21 CT abd/pelvis with oral and IV contrast  Hx: LLQ abd pain currently on antibiotics with persistent pain.      LOWER CHEST: Normal.     HEPATOBILIARY: Subtle low-attenuation liver lesions are present most likely cysts and too small to characterize by CT. These may be slightly larger than on the prior exam from 2017. No new liver lesions are appreciated. No calcified gallstones.     PANCREAS: Normal.  SPLEEN: Normal.  ADRENAL GLANDS: Normal.  KIDNEYS/BLADDER: Kidneys and bladder are unremarkable. No urinary tract calculi or hydronephrosis.     BOWEL: No evidence of bowel obstruction. Appendix is normal.   There is marked inflammation and wall thickening in the region of the proximal sigmoid colon with surrounding mesenteric inflammation. There is likely a small intramural fluid collection involving the wall of the colon. No extra colonic fluid collection or free air is evident. Several small probable reactive mesenteric nodes are present in this area.     LYMPH NODES: Small left lower quadrant mesenteric lymph nodes in the area of the colonic inflammation and cortical thickening. Small retroperitoneal nodes are also present but are not enlarged by CT criteria and appear stable.  No enlarged pelvic lymph nodes.     VASCULATURE: Unremarkable. PELVIC ORGANS: Prostate gland and rectum are unremarkable. No free pelvic fluid. MUSCULOSKELETAL: Normal.     IMPRESSION  Colonic inflammation proximal sigmoid colon with probable intramural abscess involving the wall of the sigmoid colon. This measures approximately 1 cm in diameter. No extracolonic abscess or free intraperitoneal air. Small adjacent reactive lymph nodes are present.     10/20/21 CT abd/pelvis      LOWER CHEST: Atelectatic lung base changes. No pleural fluid.     HEPATOBILIARY: Scattered low-attenuation liver lesions appear stable, most  likely cysts. No definite new or enlarging liver lesions. No calcified  gallstones. Probable vicarious excretion of previously administered intravenous  contrast noted in the gallbladder. This is a normal anatomic variant.     PANCREAS: Normal.  SPLEEN: Normal.  ADRENAL GLANDS: Normal.  KIDNEYS/BLADDER: Kidneys and bladder are unremarkable. No urinary tract calculi or hydronephrosis.     BOWEL: Persistent inflammation noted in the region of patient's preceding described diverticulitis. Small fluid collection in the wall of the sigmoid colon on image 66 of series 2 has diminished in size now measuring approximately 0.7 cm. No new extra colonic fluid collection to indicate a mesenteric abscess. No free intraperitoneal air.     LYMPH NODES: Small retroperitoneal and left common iliac lymph nodes, unchanged since prior exam.   These are not enlarged by CT criteria.     VASCULATURE: Unremarkable. PELVIC ORGANS: Prostate gland and rectum are unremarkable. No significant free pelvic fluid. MUSCULOSKELETAL: Normal.     IMPRESSION  1. Interval improvement involving the inflammation and intramural abscess proximal sigmoid colon. No new extra colonic fluid collection to indicate an abscess. No free intraperitoneal air. 2. Stable low-attenuation liver lesions most likely cysts.   3. Stable subcentimeter retroperitoneal and left common iliac lymph nodes. None are enlarged by CT criteria.  No new or enlarging lymph nodes.               Additional hx:  10/15/21 c/o headache; AF/VSS; LLQ pain; WBC 12.6--->10.4k;  IV Cipro/Flagyl/ Bowel rest  10/16/21 LLQ pain +flatus; IV Abx  10/17/21 LLQ pain better IV Abx  10/18/21 LLQ pain resolved at present; AF; WBC normal; Hold off on TPN/PICC; Repeat CT Wednesday  10/19/21 No change- LLQ pain resolved; repeat CT tomorrow   10/20/21 feels OK; repeat CT today shows abscess 10mm--->now 7mm; Plan PICC/TPN            Past Medical History:   Diagnosis Date    Diverticulosis     H/O seasonal allergies     Unspecified sleep apnea     does not use cpap     Past Surgical History:   Procedure Laterality Date    COLONOSCOPY N/A 12/13/2017    COLONOSCOPY  BMI 31 performed by Silas George MD at [facility] 3 HX ORTHOPAEDIC Right 2009    rolled bicep    MD ABDOMEN SURGERY PROC UNLISTED  2008    perc drain diverticular abscess    MD COLONOSCOPY FLX DX W/COLLJ SPEC WHEN PFRMD  12/13/2017          Current Facility-Administered Medications   Medication Dose Route Frequency    dextrose 5% - 0.45% NaCl with KCl 20 mEq/L infusion  100 mL/hr IntraVENous CONTINUOUS    ciprofloxacin (CIPRO) 400 mg in D5W IVPB (premix)  400 mg IntraVENous Q12H    metroNIDAZOLE (FLAGYL) IVPB premix 500 mg  500 mg IntraVENous Q8H    sodium chloride (NS) flush 5-40 mL  5-40 mL IntraVENous PRN    acetaminophen (TYLENOL) tablet 650 mg  650 mg Oral Q6H PRN    promethazine (PHENERGAN) tablet 12.5 mg  12.5 mg Oral Q6H PRN    Or    ondansetron (ZOFRAN) injection 4 mg  4 mg IntraVENous Q6H PRN    enoxaparin (LOVENOX) injection 40 mg  40 mg SubCUTAneous Q24H    famotidine (PF) (PEPCID) 20 mg in 0.9% sodium chloride 10 mL injection  20 mg IntraVENous Q12H    morphine injection 2 mg  2 mg IntraVENous Q1H PRN     Darvocet a500 [propoxyphene n-acetaminophen]  Social History     Socioeconomic History    Marital status:      Spouse name: Not on file    Number of children: Not on file    Years of education: Not on file    Highest education level: Not on file   Tobacco Use    Smoking status: Never Smoker    Smokeless tobacco: Never Used   Substance and Sexual Activity    Alcohol use: No    Drug use: No    Sexual activity: Yes     Partners: Female     Social Determinants of Health     Financial Resource Strain:     Difficulty of Paying Living Expenses:    Food Insecurity:     Worried About Running Out of Food in the Last Year:     [address] in the Last Year:    Transportation Needs:     Lack of Transportation (Medical):  Lack of Transportation (Non-Medical):    Physical Activity:     Days of Exercise per Week:     Minutes of Exercise per Session:    Stress:     Feeling of Stress :    Social Connections:     Frequency of Communication with Friends and Family:     Frequency of Social Gatherings with Friends and Family:     Attends [employer] Services:     Active Member of Clubs or Organizations:     Attends Club or Organization Meetings:     Marital Status:      Social History     Tobacco Use   Smoking Status Never Smoker   Smokeless Tobacco Never Used     Family History   Problem Relation Age of Onset    Diabetes Mother    Jolene Bansalos COPD Mother     Kidney Disease Mother     Heart Disease Mother     Hypertension Mother     Cancer Father         prostate    Other Father         abdominal aortic aneurysm    Sleep Apnea Father     Diabetes Maternal Grandmother     Diabetes Sister     Malignant Hyperthermia Neg Hx     Pseudocholinesterase Deficiency Neg Hx     Delayed Awakening Neg Hx     Post-op Nausea/Vomiting Neg Hx     Emergence Delirium Neg Hx     Post-op Cognitive Dysfunction Neg Hx      ROS: The patient has no difficulty with chest pain or shortness of breath. No fever or chills. Comprehensive review of systems was otherwise unremarkable except as noted above.     Physical Exam:   Visit Vitals  /68 (BP 1 Location: Right upper arm, BP Patient Position: At rest)   Pulse (!) 58 Comment: rn notified   Temp 97.8 °F (36.6 °C)   Resp 18   Ht 5' 7\" (1.702 m)   Wt 210 lb (95.3 kg)   SpO2 96%   BMI 32.89 kg/m²     Vitals:    10/19/21 1525 10/19/21 2001 10/19/21 2316 10/20/21 0320   BP: 121/66 115/65 (!) 104/58 116/68   Pulse: 65 60 (!) 57 (!) 58   Resp: 18 18 18 18   Temp: 98.2 °F (36.8 °C) 98.4 °F (36.9 °C) 97.5 °F (36.4 °C) 97.8 °F (36.6 °C)   SpO2: 94% 95% 97% 96%   Weight:       Height:         No intake/output data recorded. 10/18 1901 - 10/20 0700  In: -   Out: 2175 [Urine:2175]    Constitutional: Alert, oriented, cooperative patient in no acute distress; appears stated age    Eyes:Sclera are clear. EOMs intact  ENMT: no external lesions gross hearing normal; no obvious neck masses, no ear or lip lesions, nares normal  CV: RRR. Normal perfusion  Resp: No JVD. Breathing is  non-labored; no audible wheezing. GI: soft and non-distended; non-tender in LLQ     Musculoskeletal: unremarkable with normal function. No embolic signs or cyanosis.    Neuro:  Oriented; moves all 4; no focal deficits  Psychiatric: normal affect and mood, no memory impairment    Recent vitals (if inpt):  Patient Vitals for the past 24 hrs:   BP Temp Pulse Resp SpO2   10/20/21 0320 116/68 97.8 °F (36.6 °C) (!) 58 18 96 %   10/19/21 2316 (!) 104/58 97.5 °F (36.4 °C) (!) 57 18 97 %   10/19/21 2001 115/65 98.4 °F (36.9 °C) 60 18 95 %   10/19/21 1525 121/66 98.2 °F (36.8 °C) 65 18 94 %   10/19/21 1147 123/76 98.3 °F (36.8 °C) 67 18 92 %   10/19/21 0729 (!) 114/53 98.3 °F (36.8 °C) 62 18 95 %       Amount and/or Complexity of Data Reviewed and Analyzed:  I reviewed and analyzed all of the unique labs and radiologic studies that are shown below as well as any that are in the HPI, and any that are in the expanded problem list below  *Each unique test, order, or document contributes to the combination of 2 or combination of 3 in Category 1 below. For this visit I also reviewed old records and prior notes. Recent Labs     10/18/21  0536   WBC 6.3   HGB 12.4*         K 3.7      CO2 29   BUN 8   CREA 1.00   *     Review of most recent CBC  Lab Results   Component Value Date/Time    WBC 6.3 10/18/2021 05:36 AM    HGB 12.4 (L) 10/18/2021 05:36 AM    HCT 36.8 (L) 10/18/2021 05:36 AM    PLATELET 643 03/53/2197 05:36 AM    MCV 89.8 10/18/2021 05:36 AM       Review of most recent BMP  Lab Results   Component Value Date/Time    Sodium 140 10/18/2021 05:36 AM    Potassium 3.7 10/18/2021 05:36 AM    Chloride 105 10/18/2021 05:36 AM    CO2 29 10/18/2021 05:36 AM    Anion gap 6 (L) 10/18/2021 05:36 AM    Glucose 133 (H) 10/18/2021 05:36 AM    BUN 8 10/18/2021 05:36 AM    Creatinine 1.00 10/18/2021 05:36 AM    BUN/Creatinine ratio 15 10/19/2020 07:21 AM    GFR est AA >60 10/18/2021 05:36 AM    GFR est non-AA >60 10/18/2021 05:36 AM    Calcium 9.0 10/18/2021 05:36 AM       Review of most recent LFTs (and lipase if done)  Lab Results   Component Value Date/Time    ALT (SGPT) 32 10/14/2021 03:02 PM    AST (SGOT) 12 (L) 10/14/2021 03:02 PM    Alk.  phosphatase 77 10/14/2021 03:02 PM    Bilirubin, total 0.6 10/14/2021 03:02 PM     Lab Results   Component Value Date/Time    Lipase 56 (L) 10/14/2021 03:02 PM       Lab Results   Component Value Date/Time    INR  05/12/2008 08:55 PM     1.1  Suggested therapeutic INR range:  Venous thrombosis and embolus            2.0-3.0  Prosthetic heart valve                   2.5-3.5    aPTT 30.5  HEPARIN THERAPY RANGE:  48 - 75 SECONDS 05/12/2008 08:55 PM       Review of most recent HgbA1c  No results found for: HBA1C, WTA0UVJP, DPL6QYDS, DJI2CTVX    Nutritional assessment screen for wound healing issues:  Lab Results   Component Value Date/Time    Protein, total 9.1 (H) 10/14/2021 03:02 PM    Albumin 3.8 10/14/2021 03:02 PM       @lastcovr@  XR Results (most recent):  Results from Appointment encounter on 03/31/21    XR KNEE RT MIN 4 V    Narrative  AUTOMATIC ADMINISTRATIVE RESULT    The result for this exam can be found in the Progress note in the chart. Impression  :  See Progress note in the chart. CT Results (most recent):  Results from Hospital Encounter encounter on 10/14/21    CT ABD PELV W CONT    Narrative  CT OF THE ABDOMEN AND PELVIS    INDICATION: Left lower quadrant abdominal pain currently on antibiotics with  persistent pain. Evaluate for corticated diverticulitis. Multiple axial images were obtained through the abdomen and pelvis. Oral  contrast was used for bowel opacification. 100mL of Isovue 370 intravenous  contrast was used for better evaluation of solid organs and vascular structures. Radiation dose reduction techniques were used for this study. All CT scans  performed at this facility use one or all of the following: Automated exposure  control, adjustment of the mA and/or kVp according to patient's size, iterative  reconstruction. COMPARISON: CT abdomen and pelvis 6/7/2017. FINDINGS:  LOWER CHEST: Normal.    HEPATOBILIARY: Subtle low-attenuation liver lesions are present most likely  cysts and too small to characterize by CT. These may be slightly larger than on  the prior exam from 2017. No new liver lesions are appreciated. No calcified  gallstones. PANCREAS: Normal.    SPLEEN: Normal.    ADRENAL GLANDS: Normal.    KIDNEYS/BLADDER: Kidneys and bladder are unremarkable. No urinary tract calculi  or hydronephrosis. BOWEL: No evidence of bowel obstruction. Appendix is normal. There is marked  inflammation and wall thickening in the region of the proximal sigmoid colon  with surrounding mesenteric inflammation. There is likely a small intramural  fluid collection involving the wall of the colon. No extra colonic fluid  collection or free air is evident.  Several small probable reactive mesenteric  nodes are present in this area.    LYMPH NODES: Small left lower quadrant mesenteric lymph nodes in the area of the  colonic inflammation and cortical thickening. Small retroperitoneal nodes are  also present but are not enlarged by CT criteria and appear stable. No enlarged  pelvic lymph nodes. VASCULATURE: Unremarkable. PELVIC ORGANS: Prostate gland and rectum are unremarkable. No free pelvic fluid. MUSCULOSKELETAL: Normal.    Impression  Colonic inflammation proximal sigmoid colon with probable intramural  abscess involving the wall of the sigmoid colon. This measures approximately 1  cm in diameter. No extracolonic abscess or free intraperitoneal air. Small  adjacent reactive lymph nodes are present. US Results (most recent):  No results found for this or any previous visit.         Admission date (for inpatients): 10/14/2021   * No surgery found *  * No surgery found *        ASSESSMENT/PLAN:  Problem List  Date Reviewed: 10/12/2021        Codes Class Noted    Headache ICD-10-CM: R51.9  ICD-9-CM: 784.0  10/15/2021        * (Principal) Sigmoid diverticulitis ICD-10-CM: K57.32  ICD-9-CM: 562.11  10/14/2021        Abscess of sigmoid colon due to diverticulitis ICD-10-CM: K57.20  ICD-9-CM: 562.11, 569.5  10/14/2021        LLQ pain ICD-10-CM: R10.32  ICD-9-CM: 789.04  10/14/2021        Obesity (BMI 30.0-34.9) ICD-10-CM: E66.9  ICD-9-CM: 278.00  10/5/2020        Transient disorder of initiating or maintaining sleep ICD-10-CM: F51.02  ICD-9-CM: 307.41  10/5/2020        Hyperlipemia ICD-10-CM: E78.5  ICD-9-CM: 272.4  8/6/2014        FRENCH on CPAP ICD-10-CM: G47.33, Z99.89  ICD-9-CM: 327.23, V46.8  8/6/2014        HA (headache) ICD-10-CM: R51.9  ICD-9-CM: 784.0  8/6/2014        FH: prostate cancer ICD-10-CM: Z80.42  ICD-9-CM: V16.42  8/6/2014        FHx: AAA ICD-10-CM: QOJ3759  ICD-9-CM: GUN3807  8/6/2014        Allergic rhinitis ICD-10-CM: J30.9  ICD-9-CM: 477.9  8/6/2014        Diverticulosis of colon without diverticulitis ICD-10-CM: K57.30  ICD-9-CM: 562.10  8/6/2014            Principal Problem:    Sigmoid diverticulitis (10/14/2021)    Active Problems:    Obesity (BMI 30.0-34.9) (10/5/2020)      Abscess of sigmoid colon due to diverticulitis (10/14/2021)      LLQ pain (10/14/2021)      Headache (10/15/2021)           Number and Complexity of Problems addressed and   Risks of complications and/or morbidity of management        Acute diverticulitis with suspected 1cm intramural abscess in sigmoid colon  Continue inpt admission  He failed outpt Rx with Augmentin    NPO  IV fluids  IV Cipro/Flagyl  Serial exams    Monitor for complications  Continue home CPAP regiment    Plan   Repeat CT on 10/20/21 showed sigmoid colon wall abscess 10mm--->now 7mm  PICC/TPN  Ask for case management to get outpt TPN approval  Repeat CT in approx 2 weeks as outpt  Home on PO Augmentin        Headache-->resolved  Refractory to morphine given earlier  Tylenol 1000mg PO now + Ibuprofen 600 mg PO now  If headache issues persist, I will consider reconsulting hospitalist or neurology            Level of MDM (2/3 elements below)  Number and Complexity of Problems Addressed Amount and/or Complexity of Data to be Reviewed and Analyzed  *Each unique test, order, or document contributes to the combination of 2 or combination of 3 in Category 1 below.  Risk of Complications and/or Morbidity or Mortality of pt Management     06347  49510 SF Minimal  1self-limited or minor problem Minimal or none Minimal risk of morbidity from additional diagnostic testing or Rx   59903  85923 Low Low  2or more self-limited or minor problems;    or  1stable chronic illness;    or  8UQFFG, uncomplicated illness or injury   Limited  (Must meet the requirements of at least 1 of the 2 categories)  Category 1: Tests and documents   Any combination of 2 from the following:  Review of prior external note(s) from each unique source*;  review of the result(s) of each unique test*;   ordering of each unique test*    or   Category 2: Assessment requiring an independent historian(s)  (For the categories of independent interpretation of tests and discussion of management or test interpretation, see moderate or high) Low risk of morbidity from additional diagnostic testing or treatment     96627  49959 Mod Moderate  1or more chronic illnesses with exacerbation, progression, or side effects of treatment;    or  2or more stable chronic illnesses;    or  1undiagnosed new problem with uncertain prognosis;    or  1acute illness with systemic symptoms;    or  3QVSDZ complicated injury   Moderate  (Must meet the requirements of at least 1 out of 3 categories)  Category 1: Tests, documents, or independent historian(s)  Any combination of 3 from the following:   Review of prior external note(s) from each unique source*;  Review of the result(s) of each unique test*;  Ordering of each unique test*;  Assessment requiring an independent historian(s)    or  Category 2: Independent interpretation of tests   Independent interpretation of a test performed by another physician/other qualified health care professional (not separately reported);     or  Category 3: Discussion of management or test interpretation  Discussion of management or test interpretation with external physician/other qualified health care professional/appropriate source (not separately reported)   Moderate risk of morbidity from additional diagnostic testing or treatment  Examples only:  Prescription drug management   Decision regarding minor surgery with identified patient or procedure risk factors  Decision regarding elective major surgery without identified patient or procedure risk factors   Diagnosis or treatment significantly limited by social determinants of health       42062  70764 High High  1or more chronic illnesses with severe exacerbation, progression, or side effects of treatment;    or  1 acute or chronic illness or injury that poses a threat to life or bodily function   Extensive  (Must meet the requirements of at least 2 out of 3 categories)  Category 1: Tests, documents, or independent historian(s)  Any combination of 3 from the following:   Review of prior external note(s) from each unique source*;  Review of the result(s) of each unique test*;   Ordering of each unique test*;   Assessment requiring an independent historian(s)    or   Category 2: Independent interpretation of tests   Independent interpretation of a test performed by another physician/other qualified health care professional (not separately reported);     or  Category 3: Discussion of management or test interpretation  Discussion of management or test interpretation with external physician/other qualified health care professional/appropriate source (not separately reported)   High risk of morbidity from additional diagnostic testing or treatment  Examples only:  Drug therapy requiring intensive monitoring for toxicity  Decision regarding elective major surgery with identified patient or procedure risk factors  Decision regarding emergency major surgery  Decision regarding hospitalization  Decision not to resuscitate or to de-escalate care because of poor prognosis             I have personally performed a face-to-face diagnostic evaluation and management  service on this patient. I have independently seen the patient. I have independently obtained the above history from the patient/family. I have independently examined the patient with above findings. I have independently reviewed data/labs for this patient and developed the above plan of care (MDM). Signed: Shima Grayson.  Taylor Arenas MD, FACS

## 2021-10-20 NOTE — PROGRESS NOTES
Nutrition  Acknowledge consult for TPN (Surgery)    Orders due to pharmacy at 1330. TPN to be ordered tomorrow to begin 1800 tomorrow evening. BMP, magnesium, phosphorus, and triglyceride have been ordered for tomorrow morning.      Electronically signed by Jane Rodas MS, GARTHN, LD 10/20/2021 at 2:40 PM  Contact: [phone]

## 2021-10-21 ENCOUNTER — HOME HEALTH ADMISSION (OUTPATIENT)
Dept: HOME HEALTH SERVICES | Facility: HOME HEALTH | Age: 56
End: 2021-10-21
Payer: COMMERCIAL

## 2021-10-21 PROBLEM — E44.1 MILD PROTEIN-CALORIE MALNUTRITION (HCC): Status: ACTIVE | Noted: 2021-10-21

## 2021-10-21 LAB
ALBUMIN SERPL-MCNC: 3.2 G/DL (ref 3.5–5)
ALBUMIN/GLOB SERPL: 0.8 {RATIO} (ref 1.2–3.5)
ALP SERPL-CCNC: 57 U/L (ref 50–136)
ALT SERPL-CCNC: 51 U/L (ref 12–65)
ANION GAP SERPL CALC-SCNC: 6 MMOL/L (ref 7–16)
AST SERPL-CCNC: 35 U/L (ref 15–37)
BILIRUB DIRECT SERPL-MCNC: 0.1 MG/DL
BILIRUB SERPL-MCNC: 0.3 MG/DL (ref 0.2–1.1)
BUN SERPL-MCNC: 8 MG/DL (ref 6–23)
CALCIUM SERPL-MCNC: 8.7 MG/DL (ref 8.3–10.4)
CHLORIDE SERPL-SCNC: 105 MMOL/L (ref 98–107)
CO2 SERPL-SCNC: 27 MMOL/L (ref 21–32)
CREAT SERPL-MCNC: 0.84 MG/DL (ref 0.8–1.5)
GLOBULIN SER CALC-MCNC: 3.8 G/DL (ref 2.3–3.5)
GLUCOSE SERPL-MCNC: 122 MG/DL (ref 65–100)
MAGNESIUM SERPL-MCNC: 2 MG/DL (ref 1.8–2.4)
PHOSPHATE SERPL-MCNC: 3.4 MG/DL (ref 2.5–4.5)
POTASSIUM SERPL-SCNC: 3.6 MMOL/L (ref 3.5–5.1)
PROT SERPL-MCNC: 7 G/DL (ref 6.3–8.2)
SODIUM SERPL-SCNC: 138 MMOL/L (ref 136–145)
TRIGL SERPL-MCNC: 67 MG/DL (ref 35–150)

## 2021-10-21 PROCEDURE — 65270000029 HC RM PRIVATE

## 2021-10-21 PROCEDURE — 84100 ASSAY OF PHOSPHORUS: CPT

## 2021-10-21 PROCEDURE — 74011250636 HC RX REV CODE- 250/636: Performed by: EMERGENCY MEDICINE

## 2021-10-21 PROCEDURE — 99232 SBSQ HOSP IP/OBS MODERATE 35: CPT | Performed by: SURGERY

## 2021-10-21 PROCEDURE — 84478 ASSAY OF TRIGLYCERIDES: CPT

## 2021-10-21 PROCEDURE — 80076 HEPATIC FUNCTION PANEL: CPT

## 2021-10-21 PROCEDURE — 83735 ASSAY OF MAGNESIUM: CPT

## 2021-10-21 PROCEDURE — 74011250636 HC RX REV CODE- 250/636: Performed by: SURGERY

## 2021-10-21 PROCEDURE — 80048 BASIC METABOLIC PNL TOTAL CA: CPT

## 2021-10-21 PROCEDURE — 74011000250 HC RX REV CODE- 250: Performed by: SURGERY

## 2021-10-21 RX ADMIN — METRONIDAZOLE 500 MG: 500 INJECTION, SOLUTION INTRAVENOUS at 17:25

## 2021-10-21 RX ADMIN — Medication 20 ML: at 14:51

## 2021-10-21 RX ADMIN — FAMOTIDINE: 10 INJECTION INTRAVENOUS at 17:17

## 2021-10-21 RX ADMIN — POTASSIUM CHLORIDE, DEXTROSE MONOHYDRATE AND SODIUM CHLORIDE 100 ML/HR: 150; 5; 450 INJECTION, SOLUTION INTRAVENOUS at 04:18

## 2021-10-21 RX ADMIN — CIPROFLOXACIN 400 MG: 2 INJECTION, SOLUTION INTRAVENOUS at 17:22

## 2021-10-21 RX ADMIN — FAMOTIDINE 20 MG: 10 INJECTION INTRAVENOUS at 09:02

## 2021-10-21 RX ADMIN — CIPROFLOXACIN 400 MG: 2 INJECTION, SOLUTION INTRAVENOUS at 04:15

## 2021-10-21 RX ADMIN — METRONIDAZOLE 500 MG: 500 INJECTION, SOLUTION INTRAVENOUS at 00:06

## 2021-10-21 RX ADMIN — METRONIDAZOLE 500 MG: 500 INJECTION, SOLUTION INTRAVENOUS at 09:01

## 2021-10-21 NOTE — PROGRESS NOTES
H&P/Consult Note/Progress Note/Office Note:   Grace Biggs MRN: [id number]  :1965  Age:55 y.o.    HPI: Grace Biggs is a 50 y.o. male with h/o diverticulitis who came to the ER on 10/14/21   complaining worsening severe LLQ pain that began on 10/8/21. He reported he was treated with Augmentin  for suspected diverticulitis. Despite the Augmentin his pain worsened. Nothing in particular made his symptoms better. He reported associated nausea and fever. He had a percutaneous drain placed for diverticulitis in . He is s/p [facility] with mesh in     He is s/p colonoscopy on 2017 by Dr. Zehra Lucia. The colonoscopy report indicates \"excavated lesions and rare, shallow diverticulum\" in sigmoid colon  Otherwise normal study, repeat was recommended in 10 yrrs ()        Surgery was consulted to see the patient by Dr. Federico Ocampo. 10/14/21 CT abd/pelvis with oral and IV contrast  Hx: LLQ abd pain currently on antibiotics with persistent pain.      LOWER CHEST: Normal.     HEPATOBILIARY: Subtle low-attenuation liver lesions are present most likely cysts and too small to characterize by CT. These may be slightly larger than on the prior exam from 2017. No new liver lesions are appreciated. No calcified gallstones.     PANCREAS: Normal.  SPLEEN: Normal.  ADRENAL GLANDS: Normal.  KIDNEYS/BLADDER: Kidneys and bladder are unremarkable. No urinary tract calculi or hydronephrosis.     BOWEL: No evidence of bowel obstruction. Appendix is normal.   There is marked inflammation and wall thickening in the region of the proximal sigmoid colon with surrounding mesenteric inflammation. There is likely a small intramural fluid collection involving the wall of the colon. No extra colonic fluid collection or free air is evident.    Several small probable reactive mesenteric nodes are present in this area.     LYMPH NODES: Small left lower quadrant mesenteric lymph nodes in the area of the colonic inflammation and cortical thickening. Small retroperitoneal nodes are also present but are not enlarged by CT criteria and appear stable. No enlarged pelvic lymph nodes.     VASCULATURE: Unremarkable. PELVIC ORGANS: Prostate gland and rectum are unremarkable. No free pelvic fluid. MUSCULOSKELETAL: Normal.     IMPRESSION  Colonic inflammation proximal sigmoid colon with probable intramural abscess involving the wall of the sigmoid colon. This measures approximately 1 cm in diameter. No extracolonic abscess or free intraperitoneal air. Small adjacent reactive lymph nodes are present.           10/20/21 CT abd/pelvis (follow-up study)      LOWER CHEST: Atelectatic lung base changes. No pleural fluid.     HEPATOBILIARY: Scattered low-attenuation liver lesions appear stable, most  likely cysts. No definite new or enlarging liver lesions. No calcified  gallstones. Probable vicarious excretion of previously administered intravenous  contrast noted in the gallbladder. This is a normal anatomic variant.     PANCREAS: Normal.  SPLEEN: Normal.  ADRENAL GLANDS: Normal.  KIDNEYS/BLADDER: Kidneys and bladder are unremarkable. No urinary tract calculi or hydronephrosis.     BOWEL: Persistent inflammation noted in the region of patient's preceding described diverticulitis. Small fluid collection in the wall of the sigmoid colon on image 66 of series 2 has diminished in size now measuring approximately 0.7 cm. No new extra colonic fluid collection to indicate a mesenteric abscess. No free intraperitoneal air.     LYMPH NODES: Small retroperitoneal and left common iliac lymph nodes, unchanged since prior exam.   These are not enlarged by CT criteria.     VASCULATURE: Unremarkable. PELVIC ORGANS: Prostate gland and rectum are unremarkable. No significant free pelvic fluid. MUSCULOSKELETAL: Normal.     IMPRESSION  1. Interval improvement involving the inflammation and intramural abscess proximal sigmoid colon. No new extra colonic fluid collection to indicate an abscess. No free intraperitoneal air. 2. Stable low-attenuation liver lesions most likely cysts. 3. Stable subcentimeter retroperitoneal and left common iliac lymph nodes. None are enlarged by CT criteria. No new or enlarging lymph nodes.               Additional hx:  10/15/21 c/o headache; AF/VSS; LLQ pain; WBC 12.6--->10.4k;  IV Cipro/Flagyl/ Bowel rest  10/16/21 LLQ pain +flatus; IV Abx  10/17/21 LLQ pain better IV Abx  10/18/21 LLQ pain resolved at present; AF; WBC normal; Hold off on TPN/PICC; Repeat CT Wednesday  10/19/21 No change- LLQ pain resolved; repeat CT tomorrow   10/20/21 feels OK; repeat CT today shows abscess 10mm--->now 7mm; Plan TPN for nutritional support with non-functioning GI tract; PICC line was placed  10/21/22 TPN begins today;  Request insurance authorization for home TPN for bowel rest as outpt until the sigmoid colon wall abscess (which cannot be safely drained by IR) resolves              Past Medical History:   Diagnosis Date    Diverticulosis     H/O seasonal allergies     Unspecified sleep apnea     does not use cpap     Past Surgical History:   Procedure Laterality Date    COLONOSCOPY N/A 12/13/2017    COLONOSCOPY  BMI 31 performed by Pia Ray MD at [address] [facility] HX HERNIA REPAIR      HX ORTHOPAEDIC Right 2009    rolled bicep    [place] ABDOMEN SURGERY PROC UNLISTED  2008    perc drain diverticular abscess    [place] COLONOSCOPY FLX DX W/COLLJ SPEC WHEN PFRMD  12/13/2017          Current Facility-Administered Medications   Medication Dose Route Frequency    sodium chloride (NS) flush 20 mL  20 mL InterCATHeter Q8H    sodium chloride (NS) flush 20 mL  20 mL InterCATHeter PRN    dextrose 5% - 0.45% NaCl with KCl 20 mEq/L infusion  100 mL/hr IntraVENous CONTINUOUS    ciprofloxacin (CIPRO) 400 mg in D5W IVPB (premix)  400 mg IntraVENous Q12H    metroNIDAZOLE (FLAGYL) IVPB premix 500 mg  500 mg IntraVENous Q8H    sodium chloride (NS) flush 5-40 mL  5-40 mL IntraVENous PRN    acetaminophen (TYLENOL) tablet 650 mg  650 mg Oral Q6H PRN    promethazine (PHENERGAN) tablet 12.5 mg  12.5 mg Oral Q6H PRN    Or    ondansetron (ZOFRAN) injection 4 mg  4 mg IntraVENous Q6H PRN    enoxaparin (LOVENOX) injection 40 mg  40 mg SubCUTAneous Q24H    famotidine (PF) (PEPCID) 20 mg in 0.9% sodium chloride 10 mL injection  20 mg IntraVENous Q12H    morphine injection 2 mg  2 mg IntraVENous Q1H PRN     Darvocet a500 [propoxyphene n-acetaminophen]  Social History     Socioeconomic History    Marital status:      Spouse name: Not on file    Number of children: Not on file    Years of education: Not on file    Highest education level: Not on file   Tobacco Use    Smoking status: Never Smoker    Smokeless tobacco: Never Used   Substance and Sexual Activity    Alcohol use: No    Drug use: No    Sexual activity: Yes     Partners: Female     Social Determinants of Health     Financial Resource Strain:     Difficulty of Paying Living Expenses:    Food Insecurity:     Worried About Running Out of Food in the Last Year:     Ran Out of Food in the Last Year:    Transportation Needs:     Lack of Transportation (Medical):      Lack of Transportation (Non-Medical):    Physical Activity:     Days of Exercise per Week:     Minutes of Exercise per Session:    Stress:     Feeling of Stress :    Social Connections:     Frequency of Communication with Friends and Family:     Frequency of Social Gatherings with Friends and Family:     Attends Orthodox Services:     Active Member of Clubs or Organizations:     Attends Club or Organization Meetings:     Marital Status:      Social History     Tobacco Use   Smoking Status Never Smoker   Smokeless Tobacco Never Used     Family History   Problem Relation Age of Onset    Diabetes Mother     COPD Mother     Kidney Disease Mother     Heart Disease Mother     Hypertension Mother    Maurisio Meyer Cancer Father         prostate    Other Father         abdominal aortic aneurysm    Sleep Apnea Father     Diabetes Maternal Grandmother     Diabetes Sister     Malignant Hyperthermia Neg Hx     Pseudocholinesterase Deficiency Neg Hx     Delayed Awakening Neg Hx     Post-op Nausea/Vomiting Neg Hx     Emergence Delirium Neg Hx     Post-op Cognitive Dysfunction Neg Hx      ROS: The patient has no difficulty with chest pain or shortness of breath. No fever or chills. Comprehensive review of systems was otherwise unremarkable except as noted above. Physical Exam:   Visit Vitals  /61 (BP 1 Location: Left upper arm, BP Patient Position: At rest)   Pulse 67   Temp 97.6 °F (36.4 °C)   Resp 17   Ht 5' 7\" (1.702 m)   Wt 210 lb (95.3 kg)   SpO2 97%   BMI 32.89 kg/m²     Vitals:    10/20/21 1519 10/20/21 1944 10/20/21 2329 10/21/21 0324   BP: 134/71 (!) 155/83 133/72 105/61   Pulse: 70 66 67 67   Resp: 18 18 17 17   Temp: 98.1 °F (36.7 °C) 98.3 °F (36.8 °C) 97 °F (36.1 °C) 97.6 °F (36.4 °C)   SpO2: 95% 95% 93% 97%   Weight:       Height:         10/20 1901 - 10/21 0700  In: -   Out: 200 [Urine:200]  10/19 0701 - 10/20 1900  In: 468 [I.V.:468]  Out: 1600 [Urine:1600]    Constitutional: Alert, oriented, cooperative patient in no acute distress; appears stated age    Eyes:Sclera are clear. EOMs intact  ENMT: no external lesions gross hearing normal; no obvious neck masses, no ear or lip lesions, nares normal  CV: RRR. Normal perfusion  Resp: No JVD. Breathing is  non-labored; no audible wheezing. GI: soft and non-distended; non-tender in LLQ     Musculoskeletal: unremarkable with normal function. No embolic signs or cyanosis.    Neuro:  Oriented; moves all 4; no focal deficits  Psychiatric: normal affect and mood, no memory impairment    Recent vitals (if inpt):  Patient Vitals for the past 24 hrs:   BP Temp Pulse Resp SpO2   10/21/21 0324 105/61 97.6 °F (36.4 °C) 67 17 97 %   10/20/21 9769 133/72 97 °F (36.1 °C) 67 17 93 %   10/20/21 1944 (!) 155/83 98.3 °F (36.8 °C) 66 18 95 %   10/20/21 1519 134/71 98.1 °F (36.7 °C) 70 18 95 %   10/20/21 1141 120/81 98.6 °F (37 °C) 64 18 95 %   10/20/21 0743 128/71 98.1 °F (36.7 °C) 68 18 98 %       Amount and/or Complexity of Data Reviewed and Analyzed:  I reviewed and analyzed all of the unique labs and radiologic studies that are shown below as well as any that are in the HPI, and any that are in the expanded problem list below  *Each unique test, order, or document contributes to the combination of 2 or combination of 3 in Category 1 below. For this visit I also reviewed old records and prior notes. Recent Labs     10/21/21  0406      K 3.6      CO2 27   BUN 8   CREA 0.84   *   TBILI 0.3   CBIL 0.1   ALT 51   AP 57     Review of most recent CBC  Lab Results   Component Value Date/Time    WBC 6.3 10/18/2021 05:36 AM    HGB 12.4 (L) 10/18/2021 05:36 AM    HCT 36.8 (L) 10/18/2021 05:36 AM    PLATELET 149 94/87/1862 05:36 AM    MCV 89.8 10/18/2021 05:36 AM       Review of most recent BMP  Lab Results   Component Value Date/Time    Sodium 138 10/21/2021 04:06 AM    Potassium 3.6 10/21/2021 04:06 AM    Chloride 105 10/21/2021 04:06 AM    CO2 27 10/21/2021 04:06 AM    Anion gap 6 (L) 10/21/2021 04:06 AM    Glucose 122 (H) 10/21/2021 04:06 AM    BUN 8 10/21/2021 04:06 AM    Creatinine 0.84 10/21/2021 04:06 AM    BUN/Creatinine ratio 15 10/19/2020 07:21 AM    GFR est AA >60 10/21/2021 04:06 AM    GFR est non-AA >60 10/21/2021 04:06 AM    Calcium 8.7 10/21/2021 04:06 AM       Review of most recent LFTs (and lipase if done)  Lab Results   Component Value Date/Time    ALT (SGPT) 51 10/21/2021 04:06 AM    AST (SGOT) 35 10/21/2021 04:06 AM    Alk.  phosphatase 57 10/21/2021 04:06 AM    Bilirubin, direct 0.1 10/21/2021 04:06 AM    Bilirubin, total 0.3 10/21/2021 04:06 AM     Lab Results   Component Value Date/Time    Lipase 56 (L) 10/14/2021 03:02 PM       Lab Results   Component Value Date/Time    INR  05/12/2008 08:55 PM     1.1  Suggested therapeutic INR range:  Venous thrombosis and embolus            2.0-3.0  Prosthetic heart valve                   2.5-3.5    aPTT 30.5  HEPARIN THERAPY RANGE:  48 - 75 SECONDS 05/12/2008 08:55 PM    Bilirubin, direct 0.1 10/21/2021 04:06 AM       Review of most recent HgbA1c  No results found for: HBA1C, PPV6JOPX, UAW2URMJ, CBI3IBOJ    Nutritional assessment screen for wound healing issues:  Lab Results   Component Value Date/Time    Protein, total 7.0 10/21/2021 04:06 AM    Albumin 3.2 (L) 10/21/2021 04:06 AM       @lastcovr@  XR Results (most recent):  Results from Appointment encounter on 03/31/21    XR KNEE RT MIN 4 V    Narrative  AUTOMATIC ADMINISTRATIVE RESULT    The result for this exam can be found in the Progress note in the chart. Impression  :  See Progress note in the chart. CT Results (most recent):  Results from Hospital Encounter encounter on 10/14/21    CT ABD PELV W CONT    Narrative  CT OF THE ABDOMEN AND PELVIS    INDICATION: Follow-up sigmoid diverticulitis after antibiotic therapy. Multiple axial images were obtained through the abdomen and pelvis. Oral  contrast was used for bowel opacification. 100mL of Isovue 370 intravenous  contrast was used for better evaluation of solid organs and vascular structures. Radiation dose reduction techniques were used for this study. All CT scans  performed at this facility use one or all of the following: Automated exposure  control, adjustment of the mA and/or kVp according to patient's size, iterative  reconstruction. COMPARISON: CT abdomen and pelvis 10/14/2021. FINDINGS:  LOWER CHEST: Atelectatic lung base changes. No pleural fluid. HEPATOBILIARY: Scattered low-attenuation liver lesions appear stable, most  likely cysts. No definite new or enlarging liver lesions. No calcified  gallstones.  Probable vicarious excretion of previously administered intravenous  contrast noted in the gallbladder. This is a normal anatomic variant. PANCREAS: Normal.    SPLEEN: Normal.    ADRENAL GLANDS: Normal.    KIDNEYS/BLADDER: Kidneys and bladder are unremarkable. No urinary tract calculi  or hydronephrosis. BOWEL: Persistent inflammation noted in the region of patient's preceding  described diverticulitis. Small fluid collection in the wall of the sigmoid  colon on image 66 of series 2 has diminished in size now measuring approximately  0.7 cm. No new extra colonic fluid collection to indicate a mesenteric abscess. No free intraperitoneal air. LYMPH NODES: Small retroperitoneal and left common iliac lymph nodes, unchanged  since prior exam. These are not enlarged by CT criteria. VASCULATURE: Unremarkable. PELVIC ORGANS: Prostate gland and rectum are unremarkable. No significant free  pelvic fluid. MUSCULOSKELETAL: Normal.    Impression  1. Interval improvement involving the inflammation and intramural abscess  proximal sigmoid colon. No new extra colonic fluid collection to indicate an  abscess. No free intraperitoneal air. 2. Stable low-attenuation liver lesions most likely cysts. 3. Stable subcentimeter retroperitoneal and left common iliac lymph nodes. None  are enlarged by CT criteria. No new or enlarging lymph nodes. US Results (most recent):  No results found for this or any previous visit.         Admission date (for inpatients): 10/14/2021   * No surgery found *  * No surgery found *        ASSESSMENT/PLAN:  Problem List  Date Reviewed: 10/12/2021        Codes Class Noted    Headache ICD-10-CM: R51.9  ICD-9-CM: 784.0  10/15/2021        * (Principal) Sigmoid diverticulitis ICD-10-CM: E18.90  ICD-9-CM: 562.11  10/14/2021        Abscess of sigmoid colon due to diverticulitis ICD-10-CM: K57.20  ICD-9-CM: 562.11, 569.5  10/14/2021        LLQ pain ICD-10-CM: R10.32  ICD-9-CM: 789.04  10/14/2021        Obesity (BMI 30.0-34.9) ICD-10-CM: E66.9  ICD-9-CM: 278.00  10/5/2020        Transient disorder of initiating or maintaining sleep ICD-10-CM: F51.02  ICD-9-CM: 307.41  10/5/2020        Hyperlipemia ICD-10-CM: E78.5  ICD-9-CM: 272.4  8/6/2014        FRENCH on CPAP ICD-10-CM: G47.33, Z99.89  ICD-9-CM: 327.23, V46.8  8/6/2014        HA (headache) ICD-10-CM: R51.9  ICD-9-CM: 784.0  8/6/2014        FH: prostate cancer ICD-10-CM: Z80.42  ICD-9-CM: V16.42  8/6/2014        FHx: AAA ICD-10-CM: UUT7105  ICD-9-CM: [place]  8/6/2014        Allergic rhinitis ICD-10-CM: J30.9  ICD-9-CM: 477.9  8/6/2014        Diverticulosis of colon without diverticulitis ICD-10-CM: K57.30  ICD-9-CM: 562.10  8/6/2014            Principal Problem:    Sigmoid diverticulitis (10/14/2021)    Active Problems:    Obesity (BMI 30.0-34.9) (10/5/2020)      Abscess of sigmoid colon due to diverticulitis (10/14/2021)      LLQ pain (10/14/2021)      Headache (10/15/2021)           Number and Complexity of Problems addressed and   Risks of complications and/or morbidity of management        Acute diverticulitis with suspected 1cm intramural abscess in sigmoid colon  Continue inpt admission  He failed outpt Rx with Augmentin    NPO  IVF  IV Cipro/Flagyl  Serial exams    Monitor for complications  Continue home CPAP regiment    Plan   Repeat CT on 10/20/21 showed sigmoid colon wall abscess 10mm--->now 7mm  PICC/TPN  Ask for case management to get outpt TPN approval which he needs  He could be discharged in am on 10/22/21 if this could be setup  Otherwise, he will need to remain inpt for another 7-14 days for bowel rest/TPN/ABx as we wait for resolution of intramural sigmoid colon wall abscess  Plan repeat CT in approx 2 weeks (hopefully as as outpt) to reassess  Home on PO Augmentin (not IV ABx)        Headache-->resolved  Refractory to morphine given earlier  Tylenol 1000mg PO now + Ibuprofen 600 mg PO now  If headache issues persist, I will consider reconsulting hospitalist or neurology            Level of MDM (2/3 elements below)  Number and Complexity of Problems Addressed Amount and/or Complexity of Data to be Reviewed and Analyzed  *Each unique test, order, or document contributes to the combination of 2 or combination of 3 in Category 1 below.  Risk of Complications and/or Morbidity or Mortality of pt Management     43334  66424 SF Minimal  1self-limited or minor problem Minimal or none Minimal risk of morbidity from additional diagnostic testing or Rx   45835  59892 Low Low  2or more self-limited or minor problems;    or  1stable chronic illness;    or  4FIPTX, uncomplicated illness or injury   Limited  (Must meet the requirements of at least 1 of the 2 categories)  Category 1: Tests and documents   Any combination of 2 from the following:  Review of prior external note(s) from each unique source*;  review of the result(s) of each unique test*;   ordering of each unique test*    or   Category 2: Assessment requiring an independent historian(s)  (For the categories of independent interpretation of tests and discussion of management or test interpretation, see moderate or high) Low risk of morbidity from additional diagnostic testing or treatment     43684  53704 Mod Moderate  1or more chronic illnesses with exacerbation, progression, or side effects of treatment;    or  2or more stable chronic illnesses;    or  1undiagnosed new problem with uncertain prognosis;    or  1acute illness with systemic symptoms;    or  7GARPM complicated injury   Moderate  (Must meet the requirements of at least 1 out of 3 categories)  Category 1: Tests, documents, or independent historian(s)  Any combination of 3 from the following:   Review of prior external note(s) from each unique source*;  Review of the result(s) of each unique test*;  Ordering of each unique test*;  Assessment requiring an independent historian(s)    or  Category 2: Independent interpretation of tests   Independent interpretation of a test performed by another physician/other qualified health care professional (not separately reported);     or  Category 3: Discussion of management or test interpretation  Discussion of management or test interpretation with external physician/other qualified health care professional/appropriate source (not separately reported)   Moderate risk of morbidity from additional diagnostic testing or treatment  Examples only:  Prescription drug management   Decision regarding minor surgery with identified patient or procedure risk factors  Decision regarding elective major surgery without identified patient or procedure risk factors   Diagnosis or treatment significantly limited by social determinants of health       73064  82553 High High  1or more chronic illnesses with severe exacerbation, progression, or side effects of treatment;    or  1 acute or chronic illness or injury that poses a threat to life or bodily function   Extensive  (Must meet the requirements of at least 2 out of 3 categories)  Category 1: Tests, documents, or independent historian(s)  Any combination of 3 from the following:   Review of prior external note(s) from each unique source*;  Review of the result(s) of each unique test*;   Ordering of each unique test*;   Assessment requiring an independent historian(s)    or   Category 2: Independent interpretation of tests   Independent interpretation of a test performed by another physician/other qualified health care professional (not separately reported);     or  Category 3: Discussion of management or test interpretation  Discussion of management or test interpretation with external physician/other qualified health care professional/appropriate source (not separately reported)   High risk of morbidity from additional diagnostic testing or treatment  Examples only:  Drug therapy requiring intensive monitoring for toxicity  Decision regarding elective major surgery with identified patient or procedure risk factors  Decision regarding emergency major surgery  Decision regarding hospitalization  Decision not to resuscitate or to de-escalate care because of poor prognosis             I have personally performed a face-to-face diagnostic evaluation and management  service on this patient. I have independently seen the patient. I have independently obtained the above history from the patient/family. I have independently examined the patient with above findings. I have independently reviewed data/labs for this patient and developed the above plan of care (MDM). Signed: Oumar Franks.  Ty Farr MD, FACS

## 2021-10-21 NOTE — PROGRESS NOTES
Care Management Interventions  PCP Verified by CM: Yes  Transition of Care Consult (CM Consult): [address]: Yes  Physical Therapy Consult: Yes  Occupational Therapy Consult: Yes  Support Systems: Spouse/Significant Other  Confirm Follow Up Transport: Family  Discharge Location  Discharge Placement: Home with infusion therapy    I rec'd cost from [place] [facility] re: cost for home TPN. I discussed cost and ins coverage with pt /spouse. Pt agreeable to initial $409.27 cost then approx $371 /wk until he meets his out of pocket max of $3,327. Pt feels this will be cheaper than hospital cost and he is anxious to go home. [facility] Referral sent to [address] [place] [address] for home nursing. [place] [facility] will initiate contact with pt/spouse and begin home TPN training. We should be ready for d/c to home with TPN tomorrow/Fri. 10-22 if medically ready. Yamileth Brooke NP updated per [facility].  Rj Jarvis 91

## 2021-10-21 NOTE — CONSULTS
Comprehensive Nutrition Assessment    Type and Reason for Visit: Initial, Consult, NPO/clear liquid  TPN Management (Surgery)    Nutrition Recommendations/Plan:   Parenteral Nutrition:  Start TPN  10%DEX/ 4.25%AA at 2L (88 ml/hr) with 250 ml 20% Lipids daily  To provide: 1520 kcal/d (90% of needs), 85 grams of protein/d (100% of needs), 200 grams of CHO/d and 2250 ml of total volume/d. Lytes/L:   Sodium 80 meq (80 meq NaCl), Potassium 30 meq (20 meq KCl, 10 meq KPO4), 3 meq Mg, 4.5 meq Calcium  Other additives: MTE, MVI, Pepcid 40mg (per Dr. Lynne Lacks order)   IVF:  Stop IVF once TPN started. (discussed with RN)  Vitamin and Mineral Supplement Therapy:  Implement electrolyte management replacement protocol. Labs:    BMP daily, Phos and Mg tomorrow then MWF, Triglyceride tomorrow am.  POC Glucoses/SSI if Glucose > 180 mg/dl on AM BMP. Malnutrition Assessment:  Malnutrition Status: Mild malnutrition  Context: Acute illness  Findings of clinical characteristics of malnutrition:   Energy Intake:  7 - 50% or less of est energy requirements for 5 or more days    Nutrition Assessment:   Nutrition History: Pt states he was eating well with no change in po intake until 10/14. Pt presents with worsening left lower quadrant abdominal pain. Pt has been NPO since 10/14 for bowel rest due to sigmoid diverticulitis. Nutrition Background: Pt with PMH notable for diverticulosis. Pt is admitted for sigmoid diverticulitis with abscess. Daily Update:  Pt and wife present at time of visit. Discussed TPN starting this evening. Pt has not been weighed this admission. Discussed weight order with RN for accuracy. Noted plan for D/C home with TPN for bowel rest. [employer] checking pt's insurance coverage per CM note. Abdominal Status (last documented): Soft abdomen with Active  bowel sounds. Last BM 10/14/21.   Pertinent Medications: Cipro, Pepcid, Flagyl  IVF: D5 1/2 NS with 20meq/L KCl @ 100ml/hr (provides 408kcal/day and 48meq K/day)--started 10/15  Pertinent Labs:   CMP:   Lab Results   Component Value Date/Time     10/21/2021 04:06 AM    K 3.6 10/21/2021 04:06 AM     10/21/2021 04:06 AM    CO2 27 10/21/2021 04:06 AM    AGAP 6 (L) 10/21/2021 04:06 AM     (H) 10/21/2021 04:06 AM    BUN 8 10/21/2021 04:06 AM    CREA 0.84 10/21/2021 04:06 AM    GFRAA >60 10/21/2021 04:06 AM    GFRNA >60 10/21/2021 04:06 AM    CA 8.7 10/21/2021 04:06 AM    MG 2.0 10/21/2021 04:06 AM    PHOS 3.4 10/21/2021 04:06 AM    ALB 3.2 (L) 10/21/2021 04:06 AM    TP 7.0 10/21/2021 04:06 AM    GLOB 3.8 (H) 10/21/2021 04:06 AM    AGRAT 0.8 (L) 10/21/2021 04:06 AM    ALT 51 10/21/2021 04:06 AM     Lab Results   Component Value Date/Time    Triglyceride 67 10/21/2021 04:06 AM     Labs notable for K <4.0 since 10/14 with the exception of 10/20 with K of 4.8. With the exception of 4.8 on 10/20, K was trending down from 3.0 on 10/17. No labs for 10/19. Increase in CHO tonight from 120gm dextrose in IVF since 10/15 to 200gm this evening in TPN. Phos and Mg WNL. Triglycerides <200. Nutrition Related Findings:   NFPE completed with no muscle or subcutaneous fat loss noted (10/20) double lumen PICC placed for TPN (10/21) TPN starting      Current Nutrition Therapies:  DIET NPO Sips of Water with Meds    Current Intake:   Average Meal Intake: NPO Average Supplement Intake: NPO      Anthropometric Measures:  Height: 5' 7\" (170.2 cm)  Current Body Wt: 95.3 kg (210 lb 1.6 oz) (10/14), Weight source: Not specified  BMI: 32.9, Obese class 1 (BMI 30.0-34. 9)  Admission Body Weight: 210 lb 1.6 oz (10/14; not specified)  Ideal Body Weight (lbs) (Calculated): 148 lbs (67 kg), 142 %  Usual Body Wt: 95.3 kg (210 lb), Percent weight change: 0          Edema: No data recorded   Estimated Daily Nutrient Needs:  Energy (kcal/day): 7977-2037 (Kcal/kg (25-30), Weight Used: Ideal (67.7kg))  Protein (g/day):  Weight Used:  ( (20% kcal))  Fluid (ml/day):   (1 ml/kcal)    Nutrition Diagnosis:   · Inadequate oral intake related to altered GI structure as evidenced by NPO or clear liquid status due to medical condition, nutrition support-parenteral nutrition    Nutrition Interventions:   Food and/or Nutrient Delivery: Continue NPO, Start parenteral nutrition     Coordination of Nutrition Care: Continue to monitor while inpatient  Plan of Care discussed with Sanjana Andrews RN    Goals: Active Goal: Tolerate initiation of TPN by RD follow up. Nutrition Monitoring and Evaluation:      Food/Nutrient Intake Outcomes: Parenteral nutrition intake/tolerance  Physical Signs/Symptoms Outcomes: Biochemical data, GI status, Fluid status or edema, Weight    Discharge Planning:     Too soon to determine (possible home with TPN)    Electronically signed by Vane Rodriguez MS, RDN, LD 10/21/2021 at 10:42 AM  Contact: [phone]    Disaster Mode Active

## 2021-10-21 NOTE — PROGRESS NOTES
Problem: Nutrition Deficit  Goal: *Optimize nutritional status  Outcome: Progressing Towards Goal     Problem: Patient Education: Go to Patient Education Activity  Goal: Patient/Family Education  Outcome: Progressing Towards Goal     Problem: Pain  Goal: *Control of Pain  Outcome: Progressing Towards Goal  Goal: *PALLIATIVE CARE:  Alleviation of Pain  Outcome: Progressing Towards Goal     Problem: Patient Education: Go to Patient Education Activity  Goal: Patient/Family Education  Outcome: Progressing Towards Goal     Problem: Nausea/Vomiting (Adult)  Goal: *Absence of nausea/vomiting  Outcome: Progressing Towards Goal  Goal: *Palliation of nausea/vomiting (Palliative Care)  Outcome: Progressing Towards Goal     Problem: Patient Education: Go to Patient Education Activity  Goal: Patient/Family Education  Outcome: Progressing Towards Goal     Problem: Falls - Risk of  Goal: *Absence of Falls  Description: Document Sita Fall Risk and appropriate interventions in the flowsheet.   Outcome: Progressing Towards Goal  Note: Fall Risk Interventions:            Medication Interventions: Patient to call before getting OOB, Teach patient to arise slowly    Elimination Interventions: Call light in reach, Elevated toilet seat, Patient to call for help with toileting needs, Urinal in reach    History of Falls Interventions: Bed/chair exit alarm, Room close to nurse's station, Investigate reason for fall         Problem: Patient Education: Go to Patient Education Activity  Goal: Patient/Family Education  Outcome: Progressing Towards Goal

## 2021-10-22 VITALS
DIASTOLIC BLOOD PRESSURE: 79 MMHG | RESPIRATION RATE: 18 BRPM | SYSTOLIC BLOOD PRESSURE: 137 MMHG | HEART RATE: 80 BPM | BODY MASS INDEX: 31.58 KG/M2 | WEIGHT: 201.2 LBS | TEMPERATURE: 99.7 F | OXYGEN SATURATION: 100 % | HEIGHT: 67 IN

## 2021-10-22 LAB
ANION GAP SERPL CALC-SCNC: 3 MMOL/L (ref 7–16)
BUN SERPL-MCNC: 10 MG/DL (ref 6–23)
CALCIUM SERPL-MCNC: 8.9 MG/DL (ref 8.3–10.4)
CHLORIDE SERPL-SCNC: 107 MMOL/L (ref 98–107)
CO2 SERPL-SCNC: 29 MMOL/L (ref 21–32)
CREAT SERPL-MCNC: 0.9 MG/DL (ref 0.8–1.5)
GLUCOSE SERPL-MCNC: 125 MG/DL (ref 65–100)
MAGNESIUM SERPL-MCNC: 2.1 MG/DL (ref 1.8–2.4)
PHOSPHATE SERPL-MCNC: 3.6 MG/DL (ref 2.5–4.5)
POTASSIUM SERPL-SCNC: 3.9 MMOL/L (ref 3.5–5.1)
SODIUM SERPL-SCNC: 139 MMOL/L (ref 136–145)
TRIGL SERPL-MCNC: 86 MG/DL (ref 35–150)

## 2021-10-22 PROCEDURE — 83735 ASSAY OF MAGNESIUM: CPT

## 2021-10-22 PROCEDURE — 99239 HOSP IP/OBS DSCHRG MGMT >30: CPT | Performed by: SURGERY

## 2021-10-22 PROCEDURE — 84478 ASSAY OF TRIGLYCERIDES: CPT

## 2021-10-22 PROCEDURE — 84100 ASSAY OF PHOSPHORUS: CPT

## 2021-10-22 PROCEDURE — 74011250636 HC RX REV CODE- 250/636: Performed by: EMERGENCY MEDICINE

## 2021-10-22 PROCEDURE — 80048 BASIC METABOLIC PNL TOTAL CA: CPT

## 2021-10-22 RX ORDER — AMOXICILLIN AND CLAVULANATE POTASSIUM 875; 125 MG/1; MG/1
1 TABLET, FILM COATED ORAL EVERY 12 HOURS
Qty: 28 TABLET | Refills: 1 | Status: SHIPPED | OUTPATIENT
Start: 2021-10-22 | End: 2021-11-05

## 2021-10-22 RX ADMIN — METRONIDAZOLE 500 MG: 500 INJECTION, SOLUTION INTRAVENOUS at 00:32

## 2021-10-22 RX ADMIN — CIPROFLOXACIN 400 MG: 2 INJECTION, SOLUTION INTRAVENOUS at 04:47

## 2021-10-22 RX ADMIN — METRONIDAZOLE 500 MG: 500 INJECTION, SOLUTION INTRAVENOUS at 09:25

## 2021-10-22 NOTE — PROGRESS NOTES
Care Management Interventions  PCP Verified by CM: Yes  Transition of Care Consult (CM Consult): [address]: Yes  Physical Therapy Consult: Yes  Occupational Therapy Consult: Yes  Support Systems: Spouse/Significant Other  Confirm Follow Up Transport: Family  Discharge Location  Discharge Placement: Home with infusion therapy    Patient discharging today to home with home TPN provided by [employer] [employer] and [address] support. Benydulce maria Lane with [employer] aware that pt plans to go home this early afternoon and she will arrange his first home TPN treatment this afternoon in his home. Pt/spouse agree with plans. Pt was given the option of staying in the hospital until 4pm today to obtain initial education/training before leaving but he chose to go home and have initial training at home w/ first home TPN dose. Benita Toledo w/ [facility] [place] aware of d/c today.

## 2021-10-22 NOTE — DISCHARGE SUMMARY
[facility] [address]  [place], [address]  [phone]   Discharge Summary     Rick Najera MRN: [id number]     : 1965     Age: 50 y.o. Admit date: 10/14/2021     Discharge date:  10/22/21  Attending Physician: Doug Patton MD, MD, FACS  Primary Discharge Diagnosis:   Principal Problem:    Presumed Sigmoid diverticulitis (10/14/2021) with abscess in wall of sigmoid colon     Active Problems:    Obesity (BMI 30.0-34.9) (10/5/2020)      Abscess of sigmoid colon due to diverticulitis (10/14/2021)      LLQ pain (10/14/2021)      Headache (10/15/2021)      Mild protein-calorie malnutrition ([address] (10/21/2021)      Primary Operations or Procedures Performed :  * No surgery found *     Brief History and Reason for Admission: Rick Najera was admitted with the following history of present illness. HPI: iRck Najera is a 50 y.o. male with h/o diverticulitis who came to the ER on 10/14/21   complaining worsening severe LLQ pain that began on 10/8/21.     He reported he was treated with Augmentin  for suspected diverticulitis. Despite the Augmentin his pain worsened. Nothing in particular made his symptoms better. He reported associated nausea and fever.     He had a percutaneous drain placed for diverticulitis in . He is s/p [facility] with mesh in     He is s/p colonoscopy on 2017 by Dr. Kirsten Sepulveda. The colonoscopy report indicates \"excavated lesions and rare, shallow diverticulum\" in sigmoid colon  Otherwise normal study, repeat was recommended in 10 yrrs ()      Surgery was consulted to see the patient by Dr. Lizzeth Vegas.         10/14/21 CT abd/pelvis with oral and IV contrast  Hx: LLQ abd pain currently on antibiotics with persistent pain.      LOWER CHEST: Normal.     HEPATOBILIARY: Subtle low-attenuation liver lesions are present most likely cysts and too small to characterize by CT.    These may be slightly larger than on the prior exam from 2017. No new liver lesions are appreciated. No calcified gallstones.     PANCREAS: Normal.  SPLEEN: Normal.  ADRENAL GLANDS: Normal.  KIDNEYS/BLADDER: Kidneys and bladder are unremarkable. No urinary tract calculi or hydronephrosis.     BOWEL: No evidence of bowel obstruction. Appendix is normal.   There is marked inflammation and wall thickening in the region of the proximal sigmoid colon with surrounding mesenteric inflammation. There is likely a small intramural fluid collection involving the wall of the colon. No extra colonic fluid collection or free air is evident. Several small probable reactive mesenteric nodes are present in this area.     LYMPH NODES: Small left lower quadrant mesenteric lymph nodes in the area of the colonic inflammation and cortical thickening. Small retroperitoneal nodes are also present but are not enlarged by CT criteria and appear stable. No enlarged pelvic lymph nodes.     VASCULATURE: Unremarkable. PELVIC ORGANS: Prostate gland and rectum are unremarkable. No free pelvic fluid. MUSCULOSKELETAL: Normal.     IMPRESSION  Colonic inflammation proximal sigmoid colon with probable intramural abscess involving the wall of the sigmoid colon. This measures approximately 1 cm in diameter. No extracolonic abscess or free intraperitoneal air. Small adjacent reactive lymph nodes are present.              10/20/21 CT abd/pelvis (follow-up study)       LOWER CHEST: Atelectatic lung base changes. No pleural fluid.     HEPATOBILIARY: Scattered low-attenuation liver lesions appear stable, most  likely cysts. No definite new or enlarging liver lesions. No calcified  gallstones. Probable vicarious excretion of previously administered intravenous  contrast noted in the gallbladder. This is a normal anatomic variant.     PANCREAS: Normal.  SPLEEN: Normal.  ADRENAL GLANDS: Normal.  KIDNEYS/BLADDER: Kidneys and bladder are unremarkable.  No urinary tract calculi or hydronephrosis.     BOWEL: Persistent inflammation noted in the region of patient's preceding described diverticulitis. Small fluid collection in the wall of the sigmoid colon on image 66 of series 2 has diminished in size now measuring approximately 0.7 cm. No new extra colonic fluid collection to indicate a mesenteric abscess. No free intraperitoneal air.     LYMPH NODES: Small retroperitoneal and left common iliac lymph nodes, unchanged since prior exam.   These are not enlarged by CT criteria.     VASCULATURE: Unremarkable. PELVIC ORGANS: Prostate gland and rectum are unremarkable. No significant free pelvic fluid. MUSCULOSKELETAL: Normal.     IMPRESSION  1. Interval improvement involving the inflammation and intramural abscess proximal sigmoid colon. No new extra colonic fluid collection to indicate an abscess. No free intraperitoneal air. 2. Stable low-attenuation liver lesions most likely cysts. 3. Stable subcentimeter retroperitoneal and left common iliac lymph nodes. None are enlarged by CT criteria. No new or enlarging lymph nodes.                   Hospital Course:         10/15/21 c/o headache; AF/VSS; LLQ pain; WBC 12.6--->10.4k;  IV Cipro/Flagyl/ Bowel rest  10/16/21 LLQ pain +flatus; IV Abx  10/17/21 LLQ pain better IV Abx  10/18/21 LLQ pain resolved at present; AF; WBC normal; Hold off on TPN/PICC; Repeat CT Wednesday  10/19/21 No change- LLQ pain resolved; repeat CT tomorrow   10/20/21 feels OK; repeat CT today shows abscess 10mm--->now 7mm; Plan TPN for nutritional support with non-functioning GI tract; PICC line was placed  10/21/22 TPN begins today;  Request insurance authorization for home TPN for bowel rest as outpt until the sigmoid colon wall abscess (which cannot be safely drained by IR) resolves  10/22/21 no pain; I am told case management has setup home infusion with TPN via his PICC; discharge today          Condition at Discharge: good    Discharge Medications: Current Discharge Medication List      CONTINUE these medications which have CHANGED    Details   amoxicillin-clavulanate (AUGMENTIN) 875-125 mg per tablet Take 1 Tablet by mouth every twelve (12) hours for 14 days. Qty: 28 Tablet, Refills: 1    Associated Diagnoses: Left lower quadrant abdominal pain; History of diverticulitis         CONTINUE these medications which have NOT CHANGED    Details   loratadine (CLARITIN) 10 mg tablet Take 10 mg by mouth daily. Associated Diagnoses: Allergic rhinitis               Disposition/Discharge Instructions/Follow-up Care:      HOME TPN with infusional company and home health as per case managament    Follow-up   On 10/22/21 Dr Samantha Teague ordered a repeat CT scan to be done on Thursday 11/4/21 if possible in approx 2 weeks. This is supposed to be scheduled over the phone by the radiology outpatient scheduling department. You are to receive a phone call from them soon. Unless Dr Samantha Teague calls you on November 4 or Nov 5 (after your CT) with instructions, follow-up with Dr Samantha Teague on Nov 5 in his office. He is in operating room and in wound center that day and not in office, but tell Dr Dottie Cordova office that Dr Samantha Teague said to get you an appt at 1PM if possible that day anyway.   at:  [address], [address]  [phone]  -->  option 1)    Diet  Nothing by mouth except sips of water with medications,   sips of water for dry mouth or dehydration, and up to 3 flavored popsicles (not ice cream or chocolate) a day               Signed:  Penny Huggins MD, FACS   10/22/2021  6:27 AM  E/M service time 40min day of discharge

## 2021-10-22 NOTE — PROGRESS NOTES
Nutrition Note    Pt seen sitting in bed at time of visit. Noted pt to d/c home today with home TPN. Pt had questions regarding diet once po intake is appropriate. Discussed GI soft diet with transition to high fiber diet once cleared to start diet past \"full liquids\" per surgery. Observed TPN off at time of visit. Discussed with RN who had paused TPN for abx 10 minutes prior to RD visit. Pt has double lumen PICC. Discussed resuming TPN at 44ml/hr for 2hrs in preparation for weaning prior to D/C and prevent hypoglycemia with RN. (nursing miscellaneous order placed). Provided diet education materials for GI soft and high fiber diet. Notable labs:    Lab Results   Component Value Date/Time     10/22/2021 04:49 AM    K 3.9 10/22/2021 04:49 AM     10/22/2021 04:49 AM    CO2 29 10/22/2021 04:49 AM    AGAP 3 (L) 10/22/2021 04:49 AM     (H) 10/22/2021 04:49 AM    BUN 10 10/22/2021 04:49 AM    CREA 0.90 10/22/2021 04:49 AM    GFRAA >60 10/22/2021 04:49 AM    GFRNA >60 10/22/2021 04:49 AM    CA 8.9 10/22/2021 04:49 AM    MG 2.1 10/22/2021 04:49 AM    PHOS 3.6 10/22/2021 04:49 AM     Lab Results   Component Value Date/Time    Triglyceride 86 10/22/2021 04:49 AM     Labs notable for improving potassium. Phos and mg WNL. Triglycerides remain WNL; however, noted lipids not ordered last night. Glucose <180.     Electronically signed by Vane Rodriguez MS, RDN, LD 10/22/2021 at 12:57 PM  Contact: [phone]

## 2021-10-22 NOTE — PROGRESS NOTES
Problem: Nutrition Deficit  Goal: *Optimize nutritional status  Outcome: Resolved/Met     Problem: Patient Education: Go to Patient Education Activity  Goal: Patient/Family Education  Outcome: Resolved/Met     Problem: Pain  Goal: *Control of Pain  Outcome: Resolved/Met  Goal: *PALLIATIVE CARE:  Alleviation of Pain  Outcome: Resolved/Met     Problem: Patient Education: Go to Patient Education Activity  Goal: Patient/Family Education  Outcome: Resolved/Met     Problem: Nausea/Vomiting (Adult)  Goal: *Absence of nausea/vomiting  Outcome: Resolved/Met  Goal: *Palliation of nausea/vomiting (Palliative Care)  Outcome: Resolved/Met     Problem: Patient Education: Go to Patient Education Activity  Goal: Patient/Family Education  Outcome: Resolved/Met     Problem: Falls - Risk of  Goal: *Absence of Falls  Description: Document Sita Fall Risk and appropriate interventions in the flowsheet.   Outcome: Resolved/Met  Note: Fall Risk Interventions:            Medication Interventions: Patient to call before getting OOB, Teach patient to arise slowly    Elimination Interventions: Call light in reach, Patient to call for help with toileting needs, Urinal in reach    History of Falls Interventions: Bed/chair exit alarm, Room close to nurse's station, Investigate reason for fall         Problem: Patient Education: Go to Patient Education Activity  Goal: Patient/Family Education  Outcome: Resolved/Met

## 2021-10-24 ENCOUNTER — HOME CARE VISIT (OUTPATIENT)
Dept: SCHEDULING | Facility: HOME HEALTH | Age: 56
End: 2021-10-24
Payer: COMMERCIAL

## 2021-10-24 PROCEDURE — 400013 HH SOC

## 2021-10-24 PROCEDURE — G0299 HHS/HOSPICE OF RN EA 15 MIN: HCPCS

## 2021-10-25 ENCOUNTER — HOSPITAL ENCOUNTER (OUTPATIENT)
Dept: LAB | Age: 56
Discharge: HOME | End: 2021-10-25
Payer: COMMERCIAL

## 2021-10-25 ENCOUNTER — HOME CARE VISIT (OUTPATIENT)
Dept: SCHEDULING | Facility: HOME HEALTH | Age: 56
End: 2021-10-25
Payer: COMMERCIAL

## 2021-10-25 VITALS
TEMPERATURE: 98.2 F | RESPIRATION RATE: 17 BRPM | HEART RATE: 75 BPM | SYSTOLIC BLOOD PRESSURE: 124 MMHG | OXYGEN SATURATION: 98 % | DIASTOLIC BLOOD PRESSURE: 80 MMHG

## 2021-10-25 LAB
ALBUMIN SERPL-MCNC: 3.8 G/DL (ref 3.5–5)
ALBUMIN/GLOB SERPL: 0.9 {RATIO} (ref 1.2–3.5)
ALP SERPL-CCNC: 62 U/L (ref 50–136)
ALT SERPL-CCNC: 51 U/L (ref 12–65)
ANION GAP SERPL CALC-SCNC: 8 MMOL/L (ref 7–16)
AST SERPL-CCNC: 30 U/L (ref 15–37)
BASOPHILS # BLD: 0.1 K/UL (ref 0–0.2)
BASOPHILS NFR BLD: 1 % (ref 0–2)
BILIRUB SERPL-MCNC: 0.3 MG/DL (ref 0.2–1.1)
BUN SERPL-MCNC: 14 MG/DL (ref 6–23)
CALCIUM SERPL-MCNC: 9.5 MG/DL (ref 8.3–10.4)
CHLORIDE SERPL-SCNC: 106 MMOL/L (ref 98–107)
CO2 SERPL-SCNC: 25 MMOL/L (ref 21–32)
CREAT SERPL-MCNC: 0.79 MG/DL (ref 0.8–1.5)
DIFFERENTIAL METHOD BLD: NORMAL
EOSINOPHIL # BLD: 0.2 K/UL (ref 0–0.8)
EOSINOPHIL NFR BLD: 2 % (ref 0.5–7.8)
ERYTHROCYTE [DISTWIDTH] IN BLOOD BY AUTOMATED COUNT: 12.3 % (ref 11.9–14.6)
GLOBULIN SER CALC-MCNC: 4.4 G/DL (ref 2.3–3.5)
GLUCOSE SERPL-MCNC: 84 MG/DL (ref 65–100)
HCT VFR BLD AUTO: 41.6 % (ref 41.1–50.3)
HGB BLD-MCNC: 14 G/DL (ref 13.6–17.2)
IMM GRANULOCYTES # BLD AUTO: 0 K/UL (ref 0–0.5)
IMM GRANULOCYTES NFR BLD AUTO: 0 % (ref 0–5)
LYMPHOCYTES # BLD: 2.5 K/UL (ref 0.5–4.6)
LYMPHOCYTES NFR BLD: 27 % (ref 13–44)
MAGNESIUM SERPL-MCNC: 2.1 MG/DL (ref 1.8–2.4)
MCH RBC QN AUTO: 29.8 PG (ref 26.1–32.9)
MCHC RBC AUTO-ENTMCNC: 33.7 G/DL (ref 31.4–35)
MCV RBC AUTO: 88.5 FL (ref 79.6–97.8)
MONOCYTES # BLD: 0.7 K/UL (ref 0.1–1.3)
MONOCYTES NFR BLD: 7 % (ref 4–12)
NEUTS SEG # BLD: 5.7 K/UL (ref 1.7–8.2)
NEUTS SEG NFR BLD: 63 % (ref 43–78)
NRBC # BLD: 0 K/UL (ref 0–0.2)
PHOSPHATE SERPL-MCNC: 3.7 MG/DL (ref 2.5–4.5)
PLATELET # BLD AUTO: 293 K/UL (ref 150–450)
PMV BLD AUTO: 10.7 FL (ref 9.4–12.3)
POTASSIUM SERPL-SCNC: 4.9 MMOL/L (ref 3.5–5.1)
PREALB SERPL-MCNC: 32.4 MG/DL (ref 18–35.7)
PROT SERPL-MCNC: 8.2 G/DL (ref 6.3–8.2)
RBC # BLD AUTO: 4.7 M/UL (ref 4.23–5.6)
SODIUM SERPL-SCNC: 139 MMOL/L (ref 136–145)
TRIGL SERPL-MCNC: 183 MG/DL (ref 35–150)
WBC # BLD AUTO: 9.1 K/UL (ref 4.3–11.1)

## 2021-10-25 PROCEDURE — 84134 ASSAY OF PREALBUMIN: CPT

## 2021-10-25 PROCEDURE — 83735 ASSAY OF MAGNESIUM: CPT

## 2021-10-25 PROCEDURE — G0299 HHS/HOSPICE OF RN EA 15 MIN: HCPCS

## 2021-10-25 PROCEDURE — 85025 COMPLETE CBC W/AUTO DIFF WBC: CPT

## 2021-10-25 PROCEDURE — 84100 ASSAY OF PHOSPHORUS: CPT

## 2021-10-25 PROCEDURE — 80053 COMPREHEN METABOLIC PANEL: CPT

## 2021-10-25 PROCEDURE — 84478 ASSAY OF TRIGLYCERIDES: CPT

## 2021-10-28 VITALS
HEART RATE: 76 BPM | RESPIRATION RATE: 18 BRPM | TEMPERATURE: 98.1 F | DIASTOLIC BLOOD PRESSURE: 64 MMHG | SYSTOLIC BLOOD PRESSURE: 122 MMHG | OXYGEN SATURATION: 98 %

## 2021-11-01 ENCOUNTER — HOSPITAL ENCOUNTER (OUTPATIENT)
Dept: LAB | Age: 56
Discharge: HOME | End: 2021-11-01
Payer: COMMERCIAL

## 2021-11-01 ENCOUNTER — HOME CARE VISIT (OUTPATIENT)
Dept: SCHEDULING | Facility: HOME HEALTH | Age: 56
End: 2021-11-01
Payer: COMMERCIAL

## 2021-11-01 VITALS
OXYGEN SATURATION: 98 % | DIASTOLIC BLOOD PRESSURE: 78 MMHG | TEMPERATURE: 97.5 F | SYSTOLIC BLOOD PRESSURE: 126 MMHG | RESPIRATION RATE: 18 BRPM | HEART RATE: 82 BPM

## 2021-11-01 LAB
ALBUMIN SERPL-MCNC: 3.8 G/DL (ref 3.5–5)
ALBUMIN/GLOB SERPL: 0.9 {RATIO} (ref 1.2–3.5)
ALP SERPL-CCNC: 67 U/L (ref 50–136)
ALT SERPL-CCNC: 57 U/L (ref 12–65)
ANION GAP SERPL CALC-SCNC: 5 MMOL/L (ref 7–16)
AST SERPL-CCNC: 24 U/L (ref 15–37)
BASOPHILS # BLD: 0.1 K/UL (ref 0–0.2)
BASOPHILS NFR BLD: 1 % (ref 0–2)
BILIRUB SERPL-MCNC: 0.4 MG/DL (ref 0.2–1.1)
BUN SERPL-MCNC: 14 MG/DL (ref 6–23)
CALCIUM SERPL-MCNC: 9.3 MG/DL (ref 8.3–10.4)
CHLORIDE SERPL-SCNC: 108 MMOL/L (ref 98–107)
CO2 SERPL-SCNC: 25 MMOL/L (ref 21–32)
CREAT SERPL-MCNC: 0.68 MG/DL (ref 0.8–1.5)
DIFFERENTIAL METHOD BLD: NORMAL
EOSINOPHIL # BLD: 0.4 K/UL (ref 0–0.8)
EOSINOPHIL NFR BLD: 5 % (ref 0.5–7.8)
ERYTHROCYTE [DISTWIDTH] IN BLOOD BY AUTOMATED COUNT: 12.5 % (ref 11.9–14.6)
GLOBULIN SER CALC-MCNC: 4.2 G/DL (ref 2.3–3.5)
GLUCOSE SERPL-MCNC: 77 MG/DL (ref 65–100)
HCT VFR BLD AUTO: 42.2 % (ref 41.1–50.3)
HGB BLD-MCNC: 14.2 G/DL (ref 13.6–17.2)
IMM GRANULOCYTES # BLD AUTO: 0 K/UL (ref 0–0.5)
IMM GRANULOCYTES NFR BLD AUTO: 0 % (ref 0–5)
LYMPHOCYTES # BLD: 2.3 K/UL (ref 0.5–4.6)
LYMPHOCYTES NFR BLD: 33 % (ref 13–44)
MAGNESIUM SERPL-MCNC: 2 MG/DL (ref 1.8–2.4)
MCH RBC QN AUTO: 30.1 PG (ref 26.1–32.9)
MCHC RBC AUTO-ENTMCNC: 33.6 G/DL (ref 31.4–35)
MCV RBC AUTO: 89.6 FL (ref 79.6–97.8)
MONOCYTES # BLD: 0.6 K/UL (ref 0.1–1.3)
MONOCYTES NFR BLD: 8 % (ref 4–12)
NEUTS SEG # BLD: 3.6 K/UL (ref 1.7–8.2)
NEUTS SEG NFR BLD: 52 % (ref 43–78)
NRBC # BLD: 0 K/UL (ref 0–0.2)
PHOSPHATE SERPL-MCNC: 3.3 MG/DL (ref 2.5–4.5)
PLATELET # BLD AUTO: 211 K/UL (ref 150–450)
PMV BLD AUTO: 12 FL (ref 9.4–12.3)
POTASSIUM SERPL-SCNC: 4.8 MMOL/L (ref 3.5–5.1)
PROT SERPL-MCNC: 8 G/DL (ref 6.3–8.2)
RBC # BLD AUTO: 4.71 M/UL (ref 4.23–5.6)
SODIUM SERPL-SCNC: 138 MMOL/L (ref 136–145)
TRIGL SERPL-MCNC: 167 MG/DL (ref 35–150)
WBC # BLD AUTO: 6.9 K/UL (ref 4.3–11.1)

## 2021-11-01 PROCEDURE — G0299 HHS/HOSPICE OF RN EA 15 MIN: HCPCS

## 2021-11-01 PROCEDURE — 83735 ASSAY OF MAGNESIUM: CPT

## 2021-11-01 PROCEDURE — 85025 COMPLETE CBC W/AUTO DIFF WBC: CPT

## 2021-11-01 PROCEDURE — 80053 COMPREHEN METABOLIC PANEL: CPT

## 2021-11-01 PROCEDURE — 84478 ASSAY OF TRIGLYCERIDES: CPT

## 2021-11-01 PROCEDURE — 84100 ASSAY OF PHOSPHORUS: CPT

## 2021-11-03 ENCOUNTER — HOSPITAL ENCOUNTER (OUTPATIENT)
Dept: CT IMAGING | Age: 56
Discharge: HOME | End: 2021-11-03
Attending: SURGERY
Payer: COMMERCIAL

## 2021-11-03 DIAGNOSIS — K57.92 DIVERTICULITIS: ICD-10-CM

## 2021-11-03 PROCEDURE — 74177 CT ABD & PELVIS W/CONTRAST: CPT

## 2021-11-03 PROCEDURE — 74011000258 HC RX REV CODE- 258: Performed by: SURGERY

## 2021-11-03 PROCEDURE — 74011000636 HC RX REV CODE- 636: Performed by: SURGERY

## 2021-11-03 RX ORDER — SODIUM CHLORIDE 0.9 % (FLUSH) 0.9 %
10 SYRINGE (ML) INJECTION
Status: COMPLETED | OUTPATIENT
Start: 2021-11-03 | End: 2021-11-03

## 2021-11-03 RX ADMIN — Medication 10 ML: at 08:53

## 2021-11-03 RX ADMIN — DIATRIZOATE MEGLUMINE AND DIATRIZOATE SODIUM 15 ML: 660; 100 LIQUID ORAL; RECTAL at 08:53

## 2021-11-03 RX ADMIN — IOPAMIDOL 100 ML: 755 INJECTION, SOLUTION INTRAVENOUS at 08:52

## 2021-11-03 RX ADMIN — SODIUM CHLORIDE 100 ML: 900 INJECTION, SOLUTION INTRAVENOUS at 08:53

## 2021-11-08 ENCOUNTER — HOSPITAL ENCOUNTER (OUTPATIENT)
Dept: LAB | Age: 56
Discharge: HOME | End: 2021-11-08
Payer: COMMERCIAL

## 2021-11-08 ENCOUNTER — HOME CARE VISIT (OUTPATIENT)
Dept: SCHEDULING | Facility: HOME HEALTH | Age: 56
End: 2021-11-08
Payer: COMMERCIAL

## 2021-11-08 VITALS
RESPIRATION RATE: 17 BRPM | TEMPERATURE: 98.2 F | SYSTOLIC BLOOD PRESSURE: 122 MMHG | HEART RATE: 72 BPM | DIASTOLIC BLOOD PRESSURE: 78 MMHG | OXYGEN SATURATION: 96 %

## 2021-11-08 LAB
ALBUMIN SERPL-MCNC: 4.2 G/DL (ref 3.5–5)
ALBUMIN/GLOB SERPL: 1.2 {RATIO} (ref 1.2–3.5)
ALP SERPL-CCNC: 65 U/L (ref 50–136)
ALT SERPL-CCNC: 51 U/L (ref 12–65)
ANION GAP SERPL CALC-SCNC: 4 MMOL/L (ref 7–16)
AST SERPL-CCNC: 24 U/L (ref 15–37)
BASOPHILS # BLD: 0.1 K/UL (ref 0–0.2)
BASOPHILS NFR BLD: 1 % (ref 0–2)
BILIRUB SERPL-MCNC: 0.5 MG/DL (ref 0.2–1.1)
BUN SERPL-MCNC: 11 MG/DL (ref 6–23)
CALCIUM SERPL-MCNC: 9.1 MG/DL (ref 8.3–10.4)
CHLORIDE SERPL-SCNC: 108 MMOL/L (ref 98–107)
CO2 SERPL-SCNC: 29 MMOL/L (ref 21–32)
CREAT SERPL-MCNC: 0.87 MG/DL (ref 0.8–1.5)
DIFFERENTIAL METHOD BLD: ABNORMAL
EOSINOPHIL # BLD: 0.4 K/UL (ref 0–0.8)
EOSINOPHIL NFR BLD: 6 % (ref 0.5–7.8)
ERYTHROCYTE [DISTWIDTH] IN BLOOD BY AUTOMATED COUNT: 12.8 % (ref 11.9–14.6)
GLOBULIN SER CALC-MCNC: 3.6 G/DL (ref 2.3–3.5)
GLUCOSE SERPL-MCNC: 93 MG/DL (ref 65–100)
HCT VFR BLD AUTO: 40.2 % (ref 41.1–50.3)
HGB BLD-MCNC: 13 G/DL (ref 13.6–17.2)
IMM GRANULOCYTES # BLD AUTO: 0 K/UL (ref 0–0.5)
IMM GRANULOCYTES NFR BLD AUTO: 0 % (ref 0–5)
LYMPHOCYTES # BLD: 2.5 K/UL (ref 0.5–4.6)
LYMPHOCYTES NFR BLD: 38 % (ref 13–44)
MAGNESIUM SERPL-MCNC: 1.8 MG/DL (ref 1.8–2.4)
MCH RBC QN AUTO: 29.3 PG (ref 26.1–32.9)
MCHC RBC AUTO-ENTMCNC: 32.3 G/DL (ref 31.4–35)
MCV RBC AUTO: 90.7 FL (ref 79.6–97.8)
MONOCYTES # BLD: 0.6 K/UL (ref 0.1–1.3)
MONOCYTES NFR BLD: 9 % (ref 4–12)
NEUTS SEG # BLD: 3.1 K/UL (ref 1.7–8.2)
NEUTS SEG NFR BLD: 47 % (ref 43–78)
NRBC # BLD: 0 K/UL (ref 0–0.2)
PHOSPHATE SERPL-MCNC: 3.3 MG/DL (ref 2.5–4.5)
PLATELET # BLD AUTO: 194 K/UL (ref 150–450)
PMV BLD AUTO: 12.6 FL (ref 9.4–12.3)
POTASSIUM SERPL-SCNC: 3.9 MMOL/L (ref 3.5–5.1)
PROT SERPL-MCNC: 7.8 G/DL (ref 6.3–8.2)
RBC # BLD AUTO: 4.43 M/UL (ref 4.23–5.6)
SODIUM SERPL-SCNC: 141 MMOL/L (ref 136–145)
TRIGL SERPL-MCNC: 128 MG/DL (ref 35–150)
WBC # BLD AUTO: 6.6 K/UL (ref 4.3–11.1)

## 2021-11-08 PROCEDURE — 83735 ASSAY OF MAGNESIUM: CPT

## 2021-11-08 PROCEDURE — 85025 COMPLETE CBC W/AUTO DIFF WBC: CPT

## 2021-11-08 PROCEDURE — G0299 HHS/HOSPICE OF RN EA 15 MIN: HCPCS

## 2021-11-08 PROCEDURE — 84100 ASSAY OF PHOSPHORUS: CPT

## 2021-11-08 PROCEDURE — 80053 COMPREHEN METABOLIC PANEL: CPT

## 2021-11-08 PROCEDURE — 84478 ASSAY OF TRIGLYCERIDES: CPT

## 2021-11-16 ENCOUNTER — HOME CARE VISIT (OUTPATIENT)
Dept: SCHEDULING | Facility: HOME HEALTH | Age: 56
End: 2021-11-16
Payer: COMMERCIAL

## 2021-11-16 VITALS
OXYGEN SATURATION: 92 % | HEART RATE: 70 BPM | RESPIRATION RATE: 17 BRPM | DIASTOLIC BLOOD PRESSURE: 90 MMHG | TEMPERATURE: 98 F | SYSTOLIC BLOOD PRESSURE: 120 MMHG

## 2021-11-16 PROCEDURE — G0299 HHS/HOSPICE OF RN EA 15 MIN: HCPCS

## 2021-11-29 ENCOUNTER — HOSPITAL ENCOUNTER (OUTPATIENT)
Dept: SURGERY | Age: 56
Discharge: HOME | End: 2021-11-29
Attending: SURGERY
Payer: COMMERCIAL

## 2021-11-29 VITALS
TEMPERATURE: 97.5 F | WEIGHT: 205.7 LBS | BODY MASS INDEX: 32.28 KG/M2 | OXYGEN SATURATION: 97 % | RESPIRATION RATE: 16 BRPM | HEART RATE: 81 BPM | SYSTOLIC BLOOD PRESSURE: 142 MMHG | DIASTOLIC BLOOD PRESSURE: 72 MMHG | HEIGHT: 67 IN

## 2021-11-29 LAB
ALBUMIN SERPL-MCNC: 3.6 G/DL (ref 3.5–5)
ALBUMIN/GLOB SERPL: 0.8 {RATIO} (ref 1.2–3.5)
ALP SERPL-CCNC: 61 U/L (ref 50–136)
ALT SERPL-CCNC: 47 U/L (ref 12–65)
ANION GAP SERPL CALC-SCNC: 2 MMOL/L (ref 7–16)
AST SERPL-CCNC: 31 U/L (ref 15–37)
BASOPHILS # BLD: 0.1 K/UL (ref 0–0.2)
BASOPHILS NFR BLD: 1 % (ref 0–2)
BILIRUB SERPL-MCNC: 0.3 MG/DL (ref 0.2–1.1)
BUN SERPL-MCNC: 12 MG/DL (ref 6–23)
CALCIUM SERPL-MCNC: 9.5 MG/DL (ref 8.3–10.4)
CHLORIDE SERPL-SCNC: 107 MMOL/L (ref 98–107)
CO2 SERPL-SCNC: 28 MMOL/L (ref 21–32)
CREAT SERPL-MCNC: 0.87 MG/DL (ref 0.8–1.5)
DIFFERENTIAL METHOD BLD: ABNORMAL
EOSINOPHIL # BLD: 0.3 K/UL (ref 0–0.8)
EOSINOPHIL NFR BLD: 4 % (ref 0.5–7.8)
ERYTHROCYTE [DISTWIDTH] IN BLOOD BY AUTOMATED COUNT: 13.2 % (ref 11.9–14.6)
GLOBULIN SER CALC-MCNC: 4.7 G/DL (ref 2.3–3.5)
GLUCOSE SERPL-MCNC: 105 MG/DL (ref 65–100)
HCT VFR BLD AUTO: 39.5 % (ref 41.1–50.3)
HGB BLD-MCNC: 13.3 G/DL (ref 13.6–17.2)
IMM GRANULOCYTES # BLD AUTO: 0 K/UL (ref 0–0.5)
IMM GRANULOCYTES NFR BLD AUTO: 0 % (ref 0–5)
LYMPHOCYTES # BLD: 2.3 K/UL (ref 0.5–4.6)
LYMPHOCYTES NFR BLD: 30 % (ref 13–44)
MCH RBC QN AUTO: 30.4 PG (ref 26.1–32.9)
MCHC RBC AUTO-ENTMCNC: 33.7 G/DL (ref 31.4–35)
MCV RBC AUTO: 90.2 FL (ref 79.6–97.8)
MONOCYTES # BLD: 0.7 K/UL (ref 0.1–1.3)
MONOCYTES NFR BLD: 9 % (ref 4–12)
NEUTS SEG # BLD: 4.2 K/UL (ref 1.7–8.2)
NEUTS SEG NFR BLD: 56 % (ref 43–78)
NRBC # BLD: 0 K/UL (ref 0–0.2)
PLATELET # BLD AUTO: 293 K/UL (ref 150–450)
PMV BLD AUTO: 9.7 FL (ref 9.4–12.3)
POTASSIUM SERPL-SCNC: 3.6 MMOL/L (ref 3.5–5.1)
PROT SERPL-MCNC: 8.3 G/DL (ref 6.3–8.2)
RBC # BLD AUTO: 4.38 M/UL (ref 4.23–5.6)
SODIUM SERPL-SCNC: 137 MMOL/L (ref 136–145)
WBC # BLD AUTO: 7.5 K/UL (ref 4.3–11.1)

## 2021-11-29 PROCEDURE — 80053 COMPREHEN METABOLIC PANEL: CPT

## 2021-11-29 PROCEDURE — 85025 COMPLETE CBC W/AUTO DIFF WBC: CPT

## 2021-11-29 NOTE — PERIOP NOTES
Enhanced Recovery After Surgery: non-diabetic patients    Drink Ensure Surgery Immunonutrition  - one bottle twice daily for 5 days prior to surgery. Do not drink any Ensure Surgery Immunonutrition the day before surgery. Ensure Surgery Immunonutrition is the preferred formula over other Ensure formulas as it is the only one that is designed to support immune health and recovery from surgery. It is recommended that you continue drinking this after surgery. The night before surgery 12/2/21, drink 2 bottles of the Ensure Pre-Surgery drink. The morning of surgery 12/3/21, drink one bottle of the Ensure Pre-Surgery drink while on  your way to the hospital. Drink this over 5-10 minutes. Drink nothing else after drinking the pre-surgical drink the morning of surgery. Bring your patient handbook with you to the hospital.      Things to remember:    1. You will be up on a chair the evening of surgery and drinking clear liquids. Your diet will be advanced by your surgeon as appropriate. 2. Beginning the day after surgery, you will be up in a chair for all meals. 3. Beginning the day after surgery, you will be out of the bed for a minimum of 6 hours (not all at one time)    4. Beginning the day after surgery, you will walk in the agee in the agee at least 50' at least three times a day. 5. You will be given scheduled non-narcotic pain medication to help keep your pain under control. You will have stronger pain medication ordered for break through pain. 6. All of these measures are geared toward returning your bowel to normal function as soon as possible and to prevent complications associated with bowel blockage, blood clots, and/or pneumonia.

## 2021-11-29 NOTE — PERIOP NOTES
PLEASE CONTINUE TAKING ALL PRESCRIPTION MEDICATIONS UP TO THE DAY OF SURGERY UNLESS OTHERWISE DIRECTED BELOW. DISCONTINUE all vitamins and supplements 7 days prior to surgery. DISCONTINUE Non-Steriodal Anti-Inflammatory (NSAIDS) such as Advil and Aleve 5 days prior to surgery. Home Medications to take  the day of surgery    Loratadine (Claritin)             Home Medications   to Hold           Comments    Covid test 11/30/21 @ [address]    On the day before surgery please take Acetaminophen 1000mg in the morning and then again before bed. You may substitute for Tylenol 650 mg. Please do not bring home medications with you on the day of surgery unless otherwise directed by your nurse. If you are instructed to bring home medications, please give them to your nurse as they will be administered by the nursing staff. If you have any questions, please call [facility] [phone] or [facility] [phone]. A copy of this note was provided to the patient for reference.

## 2021-11-29 NOTE — PERIOP NOTES
Patient verified name and     Order for consent was found in EHR and matches case posting; patient verified. Type III surgery, walk-in assessment complete. Labs per surgeon: cbc,cmp, T&S  Labs per anesthesia protocol: no additional  EKG: not required per protocol    Patient COVID test date 29; Patient aware of the appointment. The testing center is located at the [address], [place]. If appointment is needed patient provided telephone number of [phone]. Hospital approved surgical skin cleanser and instructions given per hospital policy. Patient provided with and instructed on educational handouts including Guide to Surgery, Pain Management, Hand Hygiene, Blood Transfusion Education, and [place] [facility] Brochure. Patient answered medical/surgical history questions at their best of ability. All prior to admission medications documented in [facility]. Original medication prescription bottles were visualized during patient appointment. Patient instructed to hold all vitamins 7 days prior to surgery and NSAIDS 5 days prior to surgery, patient verbalized understanding. Patient teach back successful and patient demonstrates knowledge of instructions.

## 2021-11-29 NOTE — PERIOP NOTES
Recent Results (from the past 12 hour(s))   CBC WITH AUTOMATED DIFF    Collection Time: 11/29/21  1:40 PM   Result Value Ref Range    WBC 7.5 4.3 - 11.1 K/uL    RBC 4.38 4.23 - 5.6 M/uL    HGB 13.3 (L) 13.6 - 17.2 g/dL    HCT 39.5 (L) 41.1 - 50.3 %    MCV 90.2 79.6 - 97.8 FL    MCH 30.4 26.1 - 32.9 PG    MCHC 33.7 31.4 - 35.0 g/dL    RDW 13.2 11.9 - 14.6 %    PLATELET 028 649 - 529 K/uL    MPV 9.7 9.4 - 12.3 FL    ABSOLUTE NRBC 0.00 0.0 - 0.2 K/uL    DF AUTOMATED      NEUTROPHILS 56 43 - 78 %    LYMPHOCYTES 30 13 - 44 %    MONOCYTES 9 4.0 - 12.0 %    EOSINOPHILS 4 0.5 - 7.8 %    BASOPHILS 1 0.0 - 2.0 %    IMMATURE GRANULOCYTES 0 0.0 - 5.0 %    ABS. NEUTROPHILS 4.2 1.7 - 8.2 K/UL    ABS. LYMPHOCYTES 2.3 0.5 - 4.6 K/UL    ABS. MONOCYTES 0.7 0.1 - 1.3 K/UL    ABS. EOSINOPHILS 0.3 0.0 - 0.8 K/UL    ABS. BASOPHILS 0.1 0.0 - 0.2 K/UL    ABS. IMM. GRANS. 0.0 0.0 - 0.5 K/UL   METABOLIC PANEL, COMPREHENSIVE    Collection Time: 11/29/21  1:40 PM   Result Value Ref Range    Sodium 137 136 - 145 mmol/L    Potassium 3.6 3.5 - 5.1 mmol/L    Chloride 107 98 - 107 mmol/L    CO2 28 21 - 32 mmol/L    Anion gap 2 (L) 7 - 16 mmol/L    Glucose 105 (H) 65 - 100 mg/dL    BUN 12 6 - 23 MG/DL    Creatinine 0.87 0.8 - 1.5 MG/DL    GFR est AA >60 >60 ml/min/1.73m2    GFR est non-AA >60 >60 ml/min/1.73m2    Calcium 9.5 8.3 - 10.4 MG/DL    Bilirubin, total 0.3 0.2 - 1.1 MG/DL    ALT (SGPT) 47 12 - 65 U/L    AST (SGOT) 31 15 - 37 U/L    Alk.  phosphatase 61 50 - 136 U/L    Protein, total 8.3 (H) 6.3 - 8.2 g/dL    Albumin 3.6 3.5 - 5.0 g/dL    Globulin 4.7 (H) 2.3 - 3.5 g/dL    A-G Ratio 0.8 (L) 1.2 - 3.5

## 2021-12-02 ENCOUNTER — ANESTHESIA EVENT (OUTPATIENT)
Dept: SURGERY | Age: 56
DRG: 331 | End: 2021-12-02
Payer: COMMERCIAL

## 2021-12-03 ENCOUNTER — HOSPITAL ENCOUNTER (INPATIENT)
Age: 56
LOS: 4 days | Discharge: HOME | DRG: 331 | End: 2021-12-07
Attending: SURGERY | Admitting: SURGERY
Payer: COMMERCIAL

## 2021-12-03 ENCOUNTER — ANESTHESIA (OUTPATIENT)
Dept: SURGERY | Age: 56
DRG: 331 | End: 2021-12-03
Payer: COMMERCIAL

## 2021-12-03 DIAGNOSIS — Z90.49 S/P PARTIAL COLECTOMY: Primary | ICD-10-CM

## 2021-12-03 DIAGNOSIS — K57.20 ABSCESS OF SIGMOID COLON DUE TO DIVERTICULITIS: ICD-10-CM

## 2021-12-03 DIAGNOSIS — K57.32 DIVERTICULITIS OF SIGMOID COLON: ICD-10-CM

## 2021-12-03 LAB
ABO + RH BLD: NORMAL
BLOOD GROUP ANTIBODIES SERPL: NORMAL
SPECIMEN EXP DATE BLD: NORMAL

## 2021-12-03 PROCEDURE — 74011250636 HC RX REV CODE- 250/636: Performed by: SURGERY

## 2021-12-03 PROCEDURE — 77030009527 HC GEL PRT SYS AMR -E: Performed by: SURGERY

## 2021-12-03 PROCEDURE — 88307 TISSUE EXAM BY PATHOLOGIST: CPT

## 2021-12-03 PROCEDURE — 65270000029 HC RM PRIVATE

## 2021-12-03 PROCEDURE — 77030040922 HC BLNKT HYPOTHRM STRY -A: Performed by: ANESTHESIOLOGY

## 2021-12-03 PROCEDURE — 0DBN4ZZ EXCISION OF SIGMOID COLON, PERCUTANEOUS ENDOSCOPIC APPROACH: ICD-10-PCS | Performed by: SURGERY

## 2021-12-03 PROCEDURE — 76060000038 HC ANESTHESIA 3.5 TO 4 HR: Performed by: SURGERY

## 2021-12-03 PROCEDURE — 74011000250 HC RX REV CODE- 250: Performed by: SURGERY

## 2021-12-03 PROCEDURE — 77030040361 HC SLV COMPR DVT MDII -B: Performed by: SURGERY

## 2021-12-03 PROCEDURE — 77030008462 HC STPLR SKN PROX J&J -A: Performed by: SURGERY

## 2021-12-03 PROCEDURE — 0D1N4ZP BYPASS SIGMOID COLON TO RECTUM, PERCUTANEOUS ENDOSCOPIC APPROACH: ICD-10-PCS | Performed by: SURGERY

## 2021-12-03 PROCEDURE — 77030018673: Performed by: SURGERY

## 2021-12-03 PROCEDURE — 76010000174 HC OR TIME 3.5 TO 4 HR INTENSV-TIER 1: Performed by: SURGERY

## 2021-12-03 PROCEDURE — 44207 L COLECTOMY/COLOPROCTOSTOMY: CPT | Performed by: SURGERY

## 2021-12-03 PROCEDURE — 77030009979 HC RELD STPLR TCR J&J -C: Performed by: SURGERY

## 2021-12-03 PROCEDURE — 2709999900 HC NON-CHARGEABLE SUPPLY: Performed by: SURGERY

## 2021-12-03 PROCEDURE — 77030038552 HC DRN WND MDII -A: Performed by: SURGERY

## 2021-12-03 PROCEDURE — 77030000038 HC TIP SCIS LAPSCP SURI -B: Performed by: SURGERY

## 2021-12-03 PROCEDURE — 77030002966 HC SUT PDS J&J -A: Performed by: SURGERY

## 2021-12-03 PROCEDURE — 74011000250 HC RX REV CODE- 250: Performed by: REGISTERED NURSE

## 2021-12-03 PROCEDURE — 77030011264 HC ELECTRD BLD EXT COVD -A: Performed by: SURGERY

## 2021-12-03 PROCEDURE — 76210000063 HC OR PH I REC FIRST 0.5 HR: Performed by: SURGERY

## 2021-12-03 PROCEDURE — 77030002996 HC SUT SLK J&J -A: Performed by: SURGERY

## 2021-12-03 PROCEDURE — 77030008756 HC TU IRR SUC STRY -B: Performed by: SURGERY

## 2021-12-03 PROCEDURE — 77030036731 HC STPLR ENDOSC J&J -F: Performed by: SURGERY

## 2021-12-03 PROCEDURE — 77030039425 HC BLD LARYNG TRULITE DISP TELE -A: Performed by: ANESTHESIOLOGY

## 2021-12-03 PROCEDURE — 77030008606 HC TRCR ENDOSC KII AMR -B: Performed by: SURGERY

## 2021-12-03 PROCEDURE — 77030003028 HC SUT VCRL J&J -A: Performed by: SURGERY

## 2021-12-03 PROCEDURE — 74011250636 HC RX REV CODE- 250/636: Performed by: ANESTHESIOLOGY

## 2021-12-03 PROCEDURE — 77030020829: Performed by: SURGERY

## 2021-12-03 PROCEDURE — 86901 BLOOD TYPING SEROLOGIC RH(D): CPT

## 2021-12-03 PROCEDURE — 77030022703 HC LIGASURE  BLNT LAPSCP SEAL COVD -E: Performed by: SURGERY

## 2021-12-03 PROCEDURE — 77030012770 HC TRCR OPT FX AMR -B: Performed by: SURGERY

## 2021-12-03 PROCEDURE — 77030040830 HC CATH URETH FOL MDII -A: Performed by: SURGERY

## 2021-12-03 PROCEDURE — 74011250636 HC RX REV CODE- 250/636: Performed by: REGISTERED NURSE

## 2021-12-03 PROCEDURE — 74011000250 HC RX REV CODE- 250: Performed by: ANESTHESIOLOGY

## 2021-12-03 PROCEDURE — 77030019908 HC STETH ESOPH SIMS -A: Performed by: ANESTHESIOLOGY

## 2021-12-03 PROCEDURE — 74011250637 HC RX REV CODE- 250/637: Performed by: SURGERY

## 2021-12-03 PROCEDURE — 77030031139 HC SUT VCRL2 J&J -A: Performed by: SURGERY

## 2021-12-03 PROCEDURE — 74011000258 HC RX REV CODE- 258: Performed by: SURGERY

## 2021-12-03 PROCEDURE — 77030021158 HC TRCR BLN GELPRT AMR -B: Performed by: SURGERY

## 2021-12-03 PROCEDURE — 77030037088 HC TUBE ENDOTRACH ORAL NSL COVD-A: Performed by: ANESTHESIOLOGY

## 2021-12-03 PROCEDURE — 77030008518 HC TBNG INSUF ENDO STRY -B: Performed by: SURGERY

## 2021-12-03 PROCEDURE — 74011250636 HC RX REV CODE- 250/636: Performed by: NURSE ANESTHETIST, CERTIFIED REGISTERED

## 2021-12-03 RX ORDER — GLYCOPYRROLATE 0.2 MG/ML
INJECTION INTRAMUSCULAR; INTRAVENOUS AS NEEDED
Status: DISCONTINUED | OUTPATIENT
Start: 2021-12-03 | End: 2021-12-03 | Stop reason: HOSPADM

## 2021-12-03 RX ORDER — MIDAZOLAM HYDROCHLORIDE 1 MG/ML
2 INJECTION, SOLUTION INTRAMUSCULAR; INTRAVENOUS ONCE
Status: DISCONTINUED | OUTPATIENT
Start: 2021-12-03 | End: 2021-12-03 | Stop reason: HOSPADM

## 2021-12-03 RX ORDER — ONDANSETRON 2 MG/ML
4 INJECTION INTRAMUSCULAR; INTRAVENOUS
Status: DISCONTINUED | OUTPATIENT
Start: 2021-12-03 | End: 2021-12-07 | Stop reason: HOSPADM

## 2021-12-03 RX ORDER — SODIUM CHLORIDE, SODIUM LACTATE, POTASSIUM CHLORIDE, CALCIUM CHLORIDE 600; 310; 30; 20 MG/100ML; MG/100ML; MG/100ML; MG/100ML
100 INJECTION, SOLUTION INTRAVENOUS CONTINUOUS
Status: DISPENSED | OUTPATIENT
Start: 2021-12-03 | End: 2021-12-04

## 2021-12-03 RX ORDER — LIDOCAINE HYDROCHLORIDE ANHYDROUS AND DEXTROSE MONOHYDRATE .8; 5 G/100ML; G/100ML
INJECTION, SOLUTION INTRAVENOUS
Status: DISCONTINUED | OUTPATIENT
Start: 2021-12-03 | End: 2021-12-03 | Stop reason: HOSPADM

## 2021-12-03 RX ORDER — SODIUM CHLORIDE, SODIUM LACTATE, POTASSIUM CHLORIDE, CALCIUM CHLORIDE 600; 310; 30; 20 MG/100ML; MG/100ML; MG/100ML; MG/100ML
100 INJECTION, SOLUTION INTRAVENOUS CONTINUOUS
Status: DISCONTINUED | OUTPATIENT
Start: 2021-12-03 | End: 2021-12-03 | Stop reason: HOSPADM

## 2021-12-03 RX ORDER — ENOXAPARIN SODIUM 100 MG/ML
40 INJECTION SUBCUTANEOUS EVERY 24 HOURS
Status: DISCONTINUED | OUTPATIENT
Start: 2021-12-04 | End: 2021-12-07 | Stop reason: HOSPADM

## 2021-12-03 RX ORDER — NALOXONE HYDROCHLORIDE 0.4 MG/ML
0.04 INJECTION, SOLUTION INTRAMUSCULAR; INTRAVENOUS; SUBCUTANEOUS
Status: DISCONTINUED | OUTPATIENT
Start: 2021-12-03 | End: 2021-12-03 | Stop reason: HOSPADM

## 2021-12-03 RX ORDER — DEXAMETHASONE SODIUM PHOSPHATE 4 MG/ML
INJECTION, SOLUTION INTRA-ARTICULAR; INTRALESIONAL; INTRAMUSCULAR; INTRAVENOUS; SOFT TISSUE AS NEEDED
Status: DISCONTINUED | OUTPATIENT
Start: 2021-12-03 | End: 2021-12-03 | Stop reason: HOSPADM

## 2021-12-03 RX ORDER — LIDOCAINE HYDROCHLORIDE 10 MG/ML
0.1 INJECTION INFILTRATION; PERINEURAL AS NEEDED
Status: DISCONTINUED | OUTPATIENT
Start: 2021-12-03 | End: 2021-12-03 | Stop reason: HOSPADM

## 2021-12-03 RX ORDER — ACETAMINOPHEN 500 MG
1000 TABLET ORAL EVERY 6 HOURS
Status: DISCONTINUED | OUTPATIENT
Start: 2021-12-03 | End: 2021-12-07 | Stop reason: HOSPADM

## 2021-12-03 RX ORDER — DEXAMETHASONE SODIUM PHOSPHATE 4 MG/ML
INJECTION, SOLUTION INTRA-ARTICULAR; INTRALESIONAL; INTRAMUSCULAR; INTRAVENOUS; SOFT TISSUE
Status: DISCONTINUED | OUTPATIENT
Start: 2021-12-03 | End: 2021-12-03 | Stop reason: HOSPADM

## 2021-12-03 RX ORDER — FENTANYL CITRATE 50 UG/ML
INJECTION, SOLUTION INTRAMUSCULAR; INTRAVENOUS AS NEEDED
Status: DISCONTINUED | OUTPATIENT
Start: 2021-12-03 | End: 2021-12-03 | Stop reason: HOSPADM

## 2021-12-03 RX ORDER — OXYCODONE HYDROCHLORIDE 5 MG/1
5 TABLET ORAL
Status: DISCONTINUED | OUTPATIENT
Start: 2021-12-03 | End: 2021-12-07 | Stop reason: HOSPADM

## 2021-12-03 RX ORDER — NALOXONE HYDROCHLORIDE 0.4 MG/ML
0.4 INJECTION, SOLUTION INTRAMUSCULAR; INTRAVENOUS; SUBCUTANEOUS AS NEEDED
Status: DISCONTINUED | OUTPATIENT
Start: 2021-12-03 | End: 2021-12-07 | Stop reason: HOSPADM

## 2021-12-03 RX ORDER — OXYCODONE HYDROCHLORIDE 5 MG/1
5 TABLET ORAL
Status: DISCONTINUED | OUTPATIENT
Start: 2021-12-03 | End: 2021-12-03 | Stop reason: HOSPADM

## 2021-12-03 RX ORDER — ROCURONIUM BROMIDE 10 MG/ML
INJECTION, SOLUTION INTRAVENOUS AS NEEDED
Status: DISCONTINUED | OUTPATIENT
Start: 2021-12-03 | End: 2021-12-03 | Stop reason: HOSPADM

## 2021-12-03 RX ORDER — BUPIVACAINE HYDROCHLORIDE 2.5 MG/ML
INJECTION, SOLUTION EPIDURAL; INFILTRATION; INTRACAUDAL
Status: DISCONTINUED | OUTPATIENT
Start: 2021-12-03 | End: 2021-12-03 | Stop reason: HOSPADM

## 2021-12-03 RX ORDER — SODIUM CHLORIDE 0.9 % (FLUSH) 0.9 %
5-40 SYRINGE (ML) INJECTION EVERY 8 HOURS
Status: DISCONTINUED | OUTPATIENT
Start: 2021-12-03 | End: 2021-12-03 | Stop reason: HOSPADM

## 2021-12-03 RX ORDER — MIDAZOLAM HYDROCHLORIDE 1 MG/ML
2 INJECTION, SOLUTION INTRAMUSCULAR; INTRAVENOUS
Status: DISCONTINUED | OUTPATIENT
Start: 2021-12-03 | End: 2021-12-03 | Stop reason: HOSPADM

## 2021-12-03 RX ORDER — LIDOCAINE HYDROCHLORIDE 20 MG/ML
INJECTION, SOLUTION EPIDURAL; INFILTRATION; INTRACAUDAL; PERINEURAL AS NEEDED
Status: DISCONTINUED | OUTPATIENT
Start: 2021-12-03 | End: 2021-12-03 | Stop reason: HOSPADM

## 2021-12-03 RX ORDER — ONDANSETRON 4 MG/1
4 TABLET, ORALLY DISINTEGRATING ORAL
Status: DISCONTINUED | OUTPATIENT
Start: 2021-12-03 | End: 2021-12-07 | Stop reason: HOSPADM

## 2021-12-03 RX ORDER — NEOSTIGMINE METHYLSULFATE 1 MG/ML
INJECTION, SOLUTION INTRAVENOUS AS NEEDED
Status: DISCONTINUED | OUTPATIENT
Start: 2021-12-03 | End: 2021-12-03 | Stop reason: HOSPADM

## 2021-12-03 RX ORDER — SODIUM CHLORIDE 0.9 % (FLUSH) 0.9 %
5-40 SYRINGE (ML) INJECTION AS NEEDED
Status: DISCONTINUED | OUTPATIENT
Start: 2021-12-03 | End: 2021-12-03 | Stop reason: HOSPADM

## 2021-12-03 RX ORDER — KETAMINE HYDROCHLORIDE 50 MG/ML
INJECTION, SOLUTION INTRAMUSCULAR; INTRAVENOUS AS NEEDED
Status: DISCONTINUED | OUTPATIENT
Start: 2021-12-03 | End: 2021-12-03 | Stop reason: HOSPADM

## 2021-12-03 RX ORDER — LIDOCAINE HYDROCHLORIDE ANHYDROUS AND DEXTROSE MONOHYDRATE .8; 5 G/100ML; G/100ML
INJECTION, SOLUTION INTRAVENOUS
Status: DISCONTINUED | OUTPATIENT
Start: 2021-12-03 | End: 2021-12-03

## 2021-12-03 RX ORDER — ACETAMINOPHEN 500 MG
1000 TABLET ORAL ONCE
Status: DISCONTINUED | OUTPATIENT
Start: 2021-12-03 | End: 2021-12-03 | Stop reason: HOSPADM

## 2021-12-03 RX ORDER — PROPOFOL 10 MG/ML
INJECTION, EMULSION INTRAVENOUS AS NEEDED
Status: DISCONTINUED | OUTPATIENT
Start: 2021-12-03 | End: 2021-12-03 | Stop reason: HOSPADM

## 2021-12-03 RX ORDER — KETOROLAC TROMETHAMINE 30 MG/ML
15 INJECTION, SOLUTION INTRAMUSCULAR; INTRAVENOUS EVERY 6 HOURS
Status: COMPLETED | OUTPATIENT
Start: 2021-12-03 | End: 2021-12-04

## 2021-12-03 RX ORDER — FENTANYL CITRATE 50 UG/ML
100 INJECTION, SOLUTION INTRAMUSCULAR; INTRAVENOUS ONCE
Status: DISCONTINUED | OUTPATIENT
Start: 2021-12-03 | End: 2021-12-03 | Stop reason: HOSPADM

## 2021-12-03 RX ORDER — CELECOXIB 100 MG/1
100 CAPSULE ORAL 2 TIMES DAILY
Status: DISCONTINUED | OUTPATIENT
Start: 2021-12-04 | End: 2021-12-07 | Stop reason: HOSPADM

## 2021-12-03 RX ORDER — LIDOCAINE HYDROCHLORIDE ANHYDROUS AND DEXTROSE MONOHYDRATE .8; 5 G/100ML; G/100ML
1 INJECTION, SOLUTION INTRAVENOUS CONTINUOUS
Status: ACTIVE | OUTPATIENT
Start: 2021-12-03 | End: 2021-12-04

## 2021-12-03 RX ORDER — HYDROMORPHONE HYDROCHLORIDE 2 MG/ML
0.5 INJECTION, SOLUTION INTRAMUSCULAR; INTRAVENOUS; SUBCUTANEOUS
Status: DISCONTINUED | OUTPATIENT
Start: 2021-12-03 | End: 2021-12-03 | Stop reason: HOSPADM

## 2021-12-03 RX ORDER — ONDANSETRON 2 MG/ML
INJECTION INTRAMUSCULAR; INTRAVENOUS AS NEEDED
Status: DISCONTINUED | OUTPATIENT
Start: 2021-12-03 | End: 2021-12-03 | Stop reason: HOSPADM

## 2021-12-03 RX ADMIN — Medication 5 MG: at 16:56

## 2021-12-03 RX ADMIN — KETOROLAC TROMETHAMINE 15 MG: 30 INJECTION, SOLUTION INTRAMUSCULAR; INTRAVENOUS at 23:57

## 2021-12-03 RX ADMIN — SODIUM CHLORIDE, SODIUM LACTATE, POTASSIUM CHLORIDE, AND CALCIUM CHLORIDE 100 ML/HR: 600; 310; 30; 20 INJECTION, SOLUTION INTRAVENOUS at 20:13

## 2021-12-03 RX ADMIN — KETAMINE HYDROCHLORIDE 50 MG: 50 INJECTION INTRAMUSCULAR; INTRAVENOUS at 13:27

## 2021-12-03 RX ADMIN — LIDOCAINE HYDROCHLORIDE 1 MG/KG/HR: 8 INJECTION, SOLUTION INTRAVENOUS at 13:34

## 2021-12-03 RX ADMIN — KETAMINE HYDROCHLORIDE 10 MG: 50 INJECTION INTRAMUSCULAR; INTRAVENOUS at 15:25

## 2021-12-03 RX ADMIN — CEFOXITIN SODIUM 2 G: 2 POWDER, FOR SOLUTION INTRAVENOUS at 16:15

## 2021-12-03 RX ADMIN — GLYCOPYRROLATE 0.8 MG: 0.2 INJECTION, SOLUTION INTRAMUSCULAR; INTRAVENOUS at 16:56

## 2021-12-03 RX ADMIN — ROCURONIUM BROMIDE 10 MG: 10 INJECTION, SOLUTION INTRAVENOUS at 14:39

## 2021-12-03 RX ADMIN — CEFOXITIN SODIUM 2 G: 2 POWDER, FOR SOLUTION INTRAVENOUS at 13:36

## 2021-12-03 RX ADMIN — FENTANYL CITRATE 100 MCG: 50 INJECTION INTRAMUSCULAR; INTRAVENOUS at 13:27

## 2021-12-03 RX ADMIN — DEXAMETHASONE SODIUM PHOSPHATE 10 MG: 4 INJECTION, SOLUTION INTRAMUSCULAR; INTRAVENOUS at 13:39

## 2021-12-03 RX ADMIN — KETAMINE HYDROCHLORIDE 20 MG: 50 INJECTION INTRAMUSCULAR; INTRAVENOUS at 14:27

## 2021-12-03 RX ADMIN — ROCURONIUM BROMIDE 5 MG: 10 INJECTION, SOLUTION INTRAVENOUS at 15:15

## 2021-12-03 RX ADMIN — ROCURONIUM BROMIDE 50 MG: 10 INJECTION, SOLUTION INTRAVENOUS at 13:27

## 2021-12-03 RX ADMIN — BUPIVACAINE HYDROCHLORIDE 10 ML: 2.5 INJECTION, SOLUTION EPIDURAL; INFILTRATION; INTRACAUDAL at 16:55

## 2021-12-03 RX ADMIN — PROPOFOL 200 MG: 10 INJECTION, EMULSION INTRAVENOUS at 13:27

## 2021-12-03 RX ADMIN — FENTANYL CITRATE 25 MCG: 50 INJECTION INTRAMUSCULAR; INTRAVENOUS at 14:33

## 2021-12-03 RX ADMIN — LIDOCAINE HYDROCHLORIDE 100 MG: 20 INJECTION, SOLUTION EPIDURAL; INFILTRATION; INTRACAUDAL; PERINEURAL at 13:27

## 2021-12-03 RX ADMIN — DEXAMETHASONE SODIUM PHOSPHATE 4 MG: 4 INJECTION, SOLUTION INTRA-ARTICULAR; INTRALESIONAL; INTRAMUSCULAR; INTRAVENOUS; SOFT TISSUE at 16:52

## 2021-12-03 RX ADMIN — KETOROLAC TROMETHAMINE 15 MG: 30 INJECTION, SOLUTION INTRAMUSCULAR; INTRAVENOUS at 20:13

## 2021-12-03 RX ADMIN — KETAMINE HYDROCHLORIDE 10 MG: 50 INJECTION INTRAMUSCULAR; INTRAVENOUS at 16:25

## 2021-12-03 RX ADMIN — SODIUM CHLORIDE, SODIUM LACTATE, POTASSIUM CHLORIDE, AND CALCIUM CHLORIDE 100 ML/HR: 600; 310; 30; 20 INJECTION, SOLUTION INTRAVENOUS at 12:12

## 2021-12-03 RX ADMIN — FENTANYL CITRATE 25 MCG: 50 INJECTION INTRAMUSCULAR; INTRAVENOUS at 15:34

## 2021-12-03 RX ADMIN — ACETAMINOPHEN 1000 MG: 500 TABLET ORAL at 23:57

## 2021-12-03 RX ADMIN — FAMOTIDINE 20 MG: 10 INJECTION INTRAVENOUS at 20:13

## 2021-12-03 RX ADMIN — ONDANSETRON 4 MG: 2 INJECTION INTRAMUSCULAR; INTRAVENOUS at 13:39

## 2021-12-03 RX ADMIN — BUPIVACAINE HYDROCHLORIDE 30 ML: 2.5 INJECTION, SOLUTION EPIDURAL; INFILTRATION; INTRACAUDAL; PERINEURAL at 16:52

## 2021-12-03 RX ADMIN — ROCURONIUM BROMIDE 10 MG: 10 INJECTION, SOLUTION INTRAVENOUS at 15:46

## 2021-12-03 RX ADMIN — PIPERACILLIN SODIUM AND TAZOBACTAM SODIUM 4.5 G: 4; .5 INJECTION, POWDER, LYOPHILIZED, FOR SOLUTION INTRAVENOUS at 20:14

## 2021-12-03 RX ADMIN — ACETAMINOPHEN 1000 MG: 500 TABLET ORAL at 20:13

## 2021-12-03 RX ADMIN — FENTANYL CITRATE 25 MCG: 50 INJECTION INTRAMUSCULAR; INTRAVENOUS at 16:01

## 2021-12-03 NOTE — ANESTHESIA PROCEDURE NOTES
Peripheral Block    Start time: 12/3/2021 4:54 PM  End time: 12/3/2021 4:55 PM  Performed by: Arley Krishna MD  Authorized by: Dariusz Purcell MD       Pre-procedure:    Indications: at surgeon's request and post-op pain management    Preanesthetic Checklist: patient identified, risks and benefits discussed, site marked, timeout performed, anesthesia consent given and patient being monitored    Timeout Time: 16:54 EST          Block Type:   Block Type:  TAP  Laterality:  Right  Monitoring:  Standard ASA monitoring, responsive to questions, continuous pulse ox, oxygen, frequent vital sign checks and heart rate  Injection Technique:  Single shot  Procedures: ultrasound guided    Patient Position: supine  Prep: chlorhexidine    Location:  Abdominal  Needle Type:  Stimuplex  Needle Gauge:  20 G  Needle Localization:  Ultrasound guidance  Medication Injected:  Bupivacaine 0.25%, 10 mL  Med Admin Time: 12/3/2021 4:55 PM    Assessment:  Number of attempts:  1  Injection Assessment:  Incremental injection every 5 mL, local visualized surrounding nerve on ultrasound, negative aspiration for blood, no intravascular symptoms, no paresthesia and ultrasound image on chart (no violation of intraperitoneal space.)  Patient tolerance:  Patient tolerated the procedure well with no immediate complications

## 2021-12-03 NOTE — PROGRESS NOTES
TRANSFER - IN REPORT:    Verbal report received from [address] on Alejandra Arriola.  being received from PACU for routine post - op      Report consisted of patients Situation, Background, Assessment and   Recommendations(SBAR). Information from the following report(s) SBAR, Kardex, Procedure Summary, Intake/Output and MAR was reviewed with the receiving nurse. Opportunity for questions and clarification was provided. Assessment completed upon patients arrival to unit and care assumed.

## 2021-12-03 NOTE — PERIOP NOTES
TRANSFER - OUT REPORT:    Verbal report given to [address] on "[employer] System.  being transferred to [facility] [place] for routine post - op       Report consisted of patients Situation, Background, Assessment and   Recommendations(SBAR). Information from the following report(s) OR Summary, Procedure Summary, Intake/Output, MAR and Cardiac Rhythm SR was reviewed with the receiving nurse. Lines:   Peripheral IV 12/03/21 Left Antecubital (Active)   Site Assessment Clean, dry, & intact 12/03/21 1717   Phlebitis Assessment 0 12/03/21 1717   Infiltration Assessment 0 12/03/21 1717   Dressing Status Clean, dry, & intact 12/03/21 1717   Dressing Type Transparent 12/03/21 1717   Hub Color/Line Status Patent 12/03/21 1717   Alcohol Cap Used No 12/03/21 1717        Opportunity for questions and clarification was provided. Patient transported with:   Tech    VTE prophylaxis orders have been written for "[employer] System. .    Patient and family given floor number and nurses name. Family updated re: pt status after security code verified. \

## 2021-12-03 NOTE — ANESTHESIA PREPROCEDURE EVALUATION
Relevant Problems   RESPIRATORY SYSTEM   (+) FRENCH on CPAP      NEUROLOGY   (+) HA (headache)   (+) Headache       Anesthetic History   No history of anesthetic complications            Review of Systems / Medical History  Pertinent labs reviewed    Pulmonary        Sleep apnea: CPAP           Neuro/Psych   Within defined limits           Cardiovascular  Within defined limits                Exercise tolerance: >4 METS     GI/Hepatic/Renal  Within defined limits              Endo/Other        Obesity     Other Findings              Physical Exam    Airway  Mallampati: II  TM Distance: 4 - 6 cm  Neck ROM: normal range of motion   Mouth opening: Normal     Cardiovascular  Regular rate and rhythm,  S1 and S2 normal,  no murmur, click, rub, or gallop             Dental  No notable dental hx       Pulmonary  Breath sounds clear to auscultation               Abdominal  GI exam deferred       Other Findings            Anesthetic Plan    ASA: 2  Anesthesia type: general      Post-op pain plan if not by surgeon: peripheral nerve block single    Induction: Intravenous  Anesthetic plan and risks discussed with: Patient      Discussed poss TAP blocks.

## 2021-12-03 NOTE — ANESTHESIA POSTPROCEDURE EVALUATION
Procedure(s):  ERAS/COLON RESECTION SIGMOID LAPAROSCOPIC  HAND ASSISTED WITH  ANASTOMOSIS  COLOPROCTOSCOPY. general    Anesthesia Post Evaluation      Multimodal analgesia: multimodal analgesia used between 6 hours prior to anesthesia start to PACU discharge  Patient location during evaluation: bedside  Patient participation: complete - patient participated  Level of consciousness: awake and responsive to light touch  Pain management: adequate  Airway patency: patent  Anesthetic complications: no  Cardiovascular status: acceptable, hemodynamically stable, blood pressure returned to baseline and stable  Respiratory status: acceptable, unassisted, spontaneous ventilation and nonlabored ventilation  Hydration status: acceptable  Post anesthesia nausea and vomiting:  controlled      INITIAL Post-op Vital signs:   Vitals Value Taken Time   /79 12/03/21 1730   Temp 36.3 °C (97.3 °F) 12/03/21 1717   Pulse 92 12/03/21 1730   Resp 16 12/03/21 1717   SpO2 97 % 12/03/21 1730   Vitals shown include unvalidated device data.

## 2021-12-03 NOTE — PERIOP NOTES
Lidocaine drip pump settings confirmed at Ideal body weight: 66.1 kg (145 lb 11.6 oz). Infusing at 8.26 ml/hour.

## 2021-12-03 NOTE — ANESTHESIA PROCEDURE NOTES
Peripheral Block    Start time: 12/3/2021 4:52 PM  End time: 12/3/2021 4:53 PM  Performed by: Deondre Amaral MD  Authorized by: Daniel Sanchez MD       Pre-procedure:    Indications: at surgeon's request and post-op pain management    Preanesthetic Checklist: patient identified, risks and benefits discussed, site marked, timeout performed, anesthesia consent given and patient being monitored    Timeout Time: 16:52 EST          Block Type:   Block Type:  TAP  Laterality:  Left  Monitoring:  Standard ASA monitoring, responsive to questions, continuous pulse ox, oxygen, frequent vital sign checks and heart rate  Injection Technique:  Single shot  Procedures: ultrasound guided    Patient Position: supine  Prep: chlorhexidine    Location:  Abdominal  Needle Type:  Stimuplex  Needle Gauge:  20 G  Needle Localization:  Ultrasound guidance  Medication Injected:  Bupivacaine 0.25%, 30 mL  dexamethasone (DECADRON) 4 mg/mL injection, 4 mg  Med Admin Time: 12/3/2021 4:52 PM    Assessment:  Number of attempts:  1  Injection Assessment:  Incremental injection every 5 mL, local visualized surrounding nerve on ultrasound, negative aspiration for blood, no intravascular symptoms, no paresthesia and ultrasound image on chart (no violation of intraperitoneal space.)  Patient tolerance:  Patient tolerated the procedure well with no immediate complications

## 2021-12-03 NOTE — H&P
H&P/Consult Note/Progress Note/Office Note:   Antolin Chang MRN: [id number]  :1965  Age:56 y.o.    HPI: Antolin Chang is a 49 y.o. male who is here for laparoscopic-assisted sigmoid colectomy with coloproctostomy on 12/3/21. He has a h/o recurrent diverticulitis and came to the ER on 10/14/21 with worsening severe LLQ pain that began on 10/8/21. He reported he was treated with Augmentin  for suspected diverticulitis, but the pain worsened despite the Augmentin. Nothing in particular made his symptoms better. He reported associated nausea and fever. He had a percutaneous drain placed for diverticulitis in . He is s/p [facility] with mesh in     He is s/p colonoscopy on 2017 by Dr. Hay Solorzano. The colonoscopy report indicates \"excavated lesions and rare, shallow diverticulum\" in sigmoid colon  Otherwise normal study, repeat was recommended in 10 yrs ()          10/14/21 CT abd/pelvis with oral and IV contrast  Hx: LLQ abd pain currently on antibiotics with persistent pain.      LOWER CHEST: Normal.     HEPATOBILIARY: Subtle low-attenuation liver lesions are present most likely cysts and too small to characterize by CT. These may be slightly larger than on the prior exam from 2017. No new liver lesions are appreciated. No calcified gallstones.     PANCREAS: Normal.  SPLEEN: Normal.  ADRENAL GLANDS: Normal.  KIDNEYS/BLADDER: Kidneys and bladder are unremarkable. No urinary tract calculi or hydronephrosis.     BOWEL: No evidence of bowel obstruction. Appendix is normal.   There is marked inflammation and wall thickening in the region of the proximal sigmoid colon with surrounding mesenteric inflammation. There is likely a small intramural fluid collection involving the wall of the colon. No extra colonic fluid collection or free air is evident.    Several small probable reactive mesenteric nodes are present in this area.     LYMPH NODES: Small left lower quadrant mesenteric lymph nodes in the area of the colonic inflammation and cortical thickening. Small retroperitoneal nodes are also present but are not enlarged by CT criteria and appear stable. No enlarged pelvic lymph nodes.     VASCULATURE: Unremarkable. PELVIC ORGANS: Prostate gland and rectum are unremarkable. No free pelvic fluid. MUSCULOSKELETAL: Normal.     IMPRESSION  Colonic inflammation proximal sigmoid colon with probable intramural abscess involving the wall of the sigmoid colon. This measures approximately 1 cm in diameter. No extracolonic abscess or free intraperitoneal air. Small adjacent reactive lymph nodes are present.           10/20/21 CT abd/pelvis (follow-up study)      LOWER CHEST: Atelectatic lung base changes. No pleural fluid.     HEPATOBILIARY: Scattered low-attenuation liver lesions appear stable, most  likely cysts. No definite new or enlarging liver lesions. No calcified  gallstones. Probable vicarious excretion of previously administered intravenous  contrast noted in the gallbladder. This is a normal anatomic variant.     PANCREAS: Normal.  SPLEEN: Normal.  ADRENAL GLANDS: Normal.  KIDNEYS/BLADDER: Kidneys and bladder are unremarkable. No urinary tract calculi or hydronephrosis.     BOWEL: Persistent inflammation noted in the region of patient's preceding described diverticulitis. Small fluid collection in the wall of the sigmoid colon on image 66 of series 2 has diminished in size now measuring approximately 0.7 cm. No new extra colonic fluid collection to indicate a mesenteric abscess. No free intraperitoneal air.     LYMPH NODES: Small retroperitoneal and left common iliac lymph nodes, unchanged since prior exam.   These are not enlarged by CT criteria.     VASCULATURE: Unremarkable. PELVIC ORGANS: Prostate gland and rectum are unremarkable. No significant free pelvic fluid. MUSCULOSKELETAL: Normal.     IMPRESSION  1.  Interval improvement involving the inflammation and intramural abscess proximal sigmoid colon. No new extra colonic fluid collection to indicate an abscess. No free intraperitoneal air. 2. Stable low-attenuation liver lesions most likely cysts. 3. Stable subcentimeter retroperitoneal and left common iliac lymph nodes. None are enlarged by CT criteria. No new or enlarging lymph nodes.         11/3/21 CT abd/pelvis with oral and IV contrast  *  LUNG BASES: Within normal limits. *  LIVER: Multiple stable simple cysts. *  GALLBLADDER AND BILE DUCTS: Normal.  *  SPLEEN: Within normal limits. *  URINARY TRACT: Within normal limits. *  ADRENALS: Within normal limits. *  PANCREAS: Within normal limits. *  GASTROINTESTINAL TRACT: Normal appendix. Sigmoid diverticulitis improved. No evidence of an abscess. *  RETROPERITONEUM: Within normal limits. *  PERITONEAL CAVITY AND ABDOMINAL WALL: Within normal limits. *  PELVIS: Within normal limits. *  SPINE / BONES: Within normal limits. *  OTHER COMMENTS: None.     IMPRESSION  Sigmoid diverticulitis improved. No evidence for an abscess. Numerous stable hepatic cysts.              Additional hx:  10/15/21 c/o headache; AF/VSS; LLQ pain; WBC 12.6--->10.4k;  IV Cipro/Flagyl/ Bowel rest  10/16/21 LLQ pain +flatus; IV Abx  10/17/21 LLQ pain better IV Abx  10/18/21 LLQ pain resolved at present; AF; WBC normal; Hold off on TPN/PICC; Repeat CT Wednesday  10/19/21 No change- LLQ pain resolved; repeat CT tomorrow   10/20/21 feels OK; repeat CT today shows abscess 10mm--->now 7mm; Plan TPN for nutritional support with non-functioning GI tract; PICC line was placed  10/21/22 TPN begins today; Request insurance authorization for home TPN for bowel rest as outpt until the sigmoid colon wall abscess (which cannot be safely drained by IR) resolves    11/15/21 office visit; NO pain; no fevers; Tolerating PO; PICC out; off abx; didn't want surgery  11/22/21 office visit; he now wants to proceed with sigmoid resection.         12/3/21 OR;  Here for same day admission for sigmoid colon resection            Past Medical History:   Diagnosis Date    Diverticulosis     H/O seasonal allergies     Unspecified sleep apnea     wears cpap     Past Surgical History:   Procedure Laterality Date    COLONOSCOPY N/A 12/13/2017    COLONOSCOPY  BMI 31 performed by Tiny Scheuermann, MD at [facility] 3 HX ORTHOPAEDIC Right 2009    rolled bicep    OR ABDOMEN SURGERY PROC UNLISTED  2008    perc drain diverticular abscess    OR COLONOSCOPY FLX DX W/COLLJ SPEC WHEN PFRMD  12/13/2017          No current facility-administered medications for this encounter. Current Outpatient Medications   Medication Sig    loratadine (CLARITIN) 10 mg tablet Take 10 mg by mouth daily. Darvocet a500 [propoxyphene n-acetaminophen]  Social History     Socioeconomic History    Marital status:    Tobacco Use    Smoking status: Never Smoker    Smokeless tobacco: Never Used   Substance and Sexual Activity    Alcohol use: No    Drug use: No    Sexual activity: Yes     Partners: Female     Social History     Tobacco Use   Smoking Status Never Smoker   Smokeless Tobacco Never Used     Family History   Problem Relation Age of Onset    Diabetes Mother    [facility] COPD Mother     Kidney Disease Mother     Heart Disease Mother     Hypertension Mother     Cancer Father         prostate    Other Father         abdominal aortic aneurysm    Sleep Apnea Father     Diabetes Maternal Grandmother     Diabetes Sister     Malignant Hyperthermia Neg Hx     Pseudocholinesterase Deficiency Neg Hx     Delayed Awakening Neg Hx     Post-op Nausea/Vomiting Neg Hx     Emergence Delirium Neg Hx     Post-op Cognitive Dysfunction Neg Hx      ROS: The patient has no difficulty with chest pain or shortness of breath. No fever or chills. Comprehensive review of systems was otherwise unremarkable except as noted above.     Physical Exam:   There were no vitals taken for this visit. There were no vitals filed for this visit. 10/20 1901 - 10/21 0700  In: -   Out: 200 [Urine:200]  10/19 0701 - 10/20 1900  In: 65 [I.V.:468]  Out: 1600 [Urine:1600]    Constitutional: Alert, oriented, cooperative patient in no acute distress; appears stated age    Eyes:Sclera are clear. EOMs intact  ENMT: no external lesions gross hearing normal; no obvious neck masses, no ear or lip lesions, nares normal  CV: RRR. Normal perfusion  Resp: No JVD. Breathing is  non-labored; no audible wheezing. GI: soft and non-distended; non-tender in LLQ     Musculoskeletal: unremarkable with normal function. No embolic signs or cyanosis. Neuro:  Oriented; moves all 4; no focal deficits  Psychiatric: normal affect and mood, no memory impairment    Recent vitals (if inpt):  Patient Vitals for the past 24 hrs:   BP Temp Pulse Resp SpO2   10/21/21 0324 105/61 97.6 °F (36.4 °C) 67 17 97 %   10/20/21 2329 133/72 97 °F (36.1 °C) 67 17 93 %   10/20/21 1944 (!) 155/83 98.3 °F (36.8 °C) 66 18 95 %   10/20/21 1519 134/71 98.1 °F (36.7 °C) 70 18 95 %   10/20/21 1141 120/81 98.6 °F (37 °C) 64 18 95 %   10/20/21 0743 128/71 98.1 °F (36.7 °C) 68 18 98 %       Amount and/or Complexity of Data Reviewed and Analyzed:  I reviewed and analyzed all of the unique labs and radiologic studies that are shown below as well as any that are in the HPI, and any that are in the expanded problem list below  *Each unique test, order, or document contributes to the combination of 2 or combination of 3 in Category 1 below. For this visit I also reviewed old records and prior notes. No results for input(s): WBC, HGB, PLT, NA, K, CL, CO2, BUN, CREA, GLU, PTP, INR, APTT, TBIL, TBILI, CBIL, ALT, AP, AML, AML, LPSE, LCAD, NH4, TROPT, TROIQ, PCO2, PO2, HCO3, HGBEXT, PLTEXT, INREXT, HGBEXT, PLTEXT, INREXT in the last 72 hours.     No lab exists for component: SGOT, GPT,  PH  Review of most recent CBC  Lab Results   Component Value Date/Time    WBC 7.5 11/29/2021 01:40 PM    HGB 13.3 (L) 11/29/2021 01:40 PM    HCT 39.5 (L) 11/29/2021 01:40 PM    PLATELET 126 57/04/6867 01:40 PM    MCV 90.2 11/29/2021 01:40 PM       Review of most recent BMP  Lab Results   Component Value Date/Time    Sodium 137 11/29/2021 01:40 PM    Potassium 3.6 11/29/2021 01:40 PM    Chloride 107 11/29/2021 01:40 PM    CO2 28 11/29/2021 01:40 PM    Anion gap 2 (L) 11/29/2021 01:40 PM    Glucose 105 (H) 11/29/2021 01:40 PM    BUN 12 11/29/2021 01:40 PM    Creatinine 0.87 11/29/2021 01:40 PM    BUN/Creatinine ratio 15 10/19/2020 07:21 AM    GFR est AA >60 11/29/2021 01:40 PM    GFR est non-AA >60 11/29/2021 01:40 PM    Calcium 9.5 11/29/2021 01:40 PM       Review of most recent LFTs (and lipase if done)  Lab Results   Component Value Date/Time    ALT (SGPT) 47 11/29/2021 01:40 PM    AST (SGOT) 31 11/29/2021 01:40 PM    Alk. phosphatase 61 11/29/2021 01:40 PM    Bilirubin, direct 0.1 10/21/2021 04:06 AM    Bilirubin, total 0.3 11/29/2021 01:40 PM     Lab Results   Component Value Date/Time    Lipase 56 (L) 10/14/2021 03:02 PM       Lab Results   Component Value Date/Time    INR  05/12/2008 08:55 PM     1.1  Suggested therapeutic INR range:  Venous thrombosis and embolus            2.0-3.0  Prosthetic heart valve                   2.5-3.5    aPTT 30.5  HEPARIN THERAPY RANGE:  48 - 75 SECONDS 05/12/2008 08:55 PM    Bilirubin, direct 0.1 10/21/2021 04:06 AM       Review of most recent HgbA1c  No results found for: HBA1C, YCY6HFXR, QGC3FOED, TUO8PERT    Nutritional assessment screen for wound healing issues:  Lab Results   Component Value Date/Time    Protein, total 8.3 (H) 11/29/2021 01:40 PM    Albumin 3.6 11/29/2021 01:40 PM       @lastcovr@  XR Results (most recent):  Results from Appointment encounter on 03/31/21    XR KNEE RT MIN 4 V    Narrative  AUTOMATIC ADMINISTRATIVE RESULT    The result for this exam can be found in the Progress note in the chart.     Impression  :  See Progress note in the chart. CT Results (most recent):  Results from Hospital Encounter encounter on 11/03/21    CT ABD PELV W CONT    Narrative  CT ABDOMEN AND PELVIS WITH CONTRAST    HISTORY: Acute diverticulitis    COMPARISON: 10/20/2021    TECHNIQUE: Helical imaging was performed from the lung bases through the ischial  tuberosities during the intravenous infusion of 100 cc of Isovue-370. Oral  contrast was administered. Coronal and sagittal reformats were performed. Dose reduction techniques used: Automated exposure control, adjustment of the  mAs and/or kVp according to patient's size, and iterative reconstruction  techniques. FINDINGS:  *  LUNG BASES: Within normal limits. *  LIVER: Multiple stable simple cysts. *  GALLBLADDER AND BILE DUCTS: Normal.  *  SPLEEN: Within normal limits. *  URINARY TRACT: Within normal limits. *  ADRENALS: Within normal limits. *  PANCREAS: Within normal limits. *  GASTROINTESTINAL TRACT: Normal appendix. Sigmoid diverticulitis improved. No  evidence of an abscess. *  RETROPERITONEUM: Within normal limits. *  PERITONEAL CAVITY AND ABDOMINAL WALL: Within normal limits. *  PELVIS: Within normal limits. *  SPINE / BONES: Within normal limits. *  OTHER COMMENTS: None. Impression  Sigmoid diverticulitis improved. No evidence for an abscess. Numerous stable hepatic cysts. Date of Dictation: 11/3/2021 9:11 AM    US Results (most recent):  No results found for this or any previous visit.         Admission date (for inpatients): (Not on file)   * No surgery found *  Procedure(s):  ERAS/COLON RESECTION SIGMOID LAPAROSCOPIC  WITH  ANASTOMOSIS  COLOPROCTOSCOPY        ASSESSMENT/PLAN:  Problem List  Date Reviewed: 11/22/2021          Codes Class Noted    Mild protein-calorie malnutrition ([facility] ICD-10-CM: E44.1  ICD-9-CM: 263.1  10/21/2021        Headache ICD-10-CM: R51.9  ICD-9-CM: 784.0  10/15/2021        Sigmoid diverticulitis ICD-10-CM: M37.66  ICD-9-CM: 562.11 10/14/2021        Abscess of sigmoid colon due to diverticulitis ICD-10-CM: K57.20  ICD-9-CM: 562.11, 569.5  10/14/2021        LLQ pain ICD-10-CM: R10.32  ICD-9-CM: 789.04  10/14/2021        Obesity (BMI 30.0-34.9) ICD-10-CM: E66.9  ICD-9-CM: 278.00  10/5/2020        Transient disorder of initiating or maintaining sleep ICD-10-CM: F51.02  ICD-9-CM: 307.41  10/5/2020        Hyperlipemia ICD-10-CM: E78.5  ICD-9-CM: 272.4  8/6/2014        FRENCH on CPAP ICD-10-CM: G47.33, Z99.89  ICD-9-CM: 327.23, V46.8  8/6/2014        HA (headache) ICD-10-CM: R51.9  ICD-9-CM: 784.0  8/6/2014        FH: prostate cancer ICD-10-CM: Z80.42  ICD-9-CM: V16.42  8/6/2014        FHx: AAA ICD-10-CM: FHX4616  ICD-9-CM: Dennis Barnhart  8/6/2014        Allergic rhinitis ICD-10-CM: J30.9  ICD-9-CM: 477.9  8/6/2014        Diverticulosis of colon without diverticulitis ICD-10-CM: K57.30  ICD-9-CM: 562.10  8/6/2014            Active Problems:    * No active hospital problems. *         Number and Complexity of Problems addressed and   Risks of complications and/or morbidity of management            Recurrent diverticulitis complicated by recent suspected 1cm intramural abscess in sigmoid colon  He is here for laparoscopic-assisted sigmoid colectomy with coloproctostomy on 12/3/21  He remained on oral antibiotics leading up to surgery and had an outpt bowel prep the day before surgery including erythromycin and neomycin. He was told to discontinue the ciprofloxacin the day before surgery to avoid any interaction with erythromycin and his QT intervals    Informed consent was obtained for laparoscopic assisted sigmoid colectomy with coloproctostomy. Procedure risks were discussed including bleeding, infection, anastomotic leak requiring colostomy, injury to ureter, small bowel obstruction, hernia,   the possibility of finding malignancy in the colon specimen,  blood clots, risk of anesthesia, etc.. All his questions were answered.   He was in favor proceeding. I have personally performed a face-to-face diagnostic evaluation and management  service on this patient. I have independently seen the patient. I have independently obtained the above history from the patient/family. I have independently examined the patient with above findings. I have independently reviewed data/labs for this patient and developed the above plan of care (MDM). Signed: Erickson Hall.  Maranda Alonso MD, FACS

## 2021-12-03 NOTE — BRIEF OP NOTE
BRIEF OPERATIVE NOTE    Date of Procedure:  12/3/2021    Preoperative Diagnosis: Diverticulitis, colon [K57.32]  Postoperative Diagnosis: same    Procedure: Laparoscopic-assisted sigmoid colectomy with coloproctostomy    Surgeon(s) and Role:     * Barbie Berrios MD - Primary  Anesthesia: General  Estimated Blood Loss: <120cc  Specimens:   ID Type Source Tests Collected by Time Destination   1 : Sigmoid colon  Fresh Sigmoid  Barbie Berrios MD 12/3/2021 1437 Pathology     Findings: see op note   Complications: none  Implants: 19 round Jacobo drain

## 2021-12-04 LAB
ANION GAP SERPL CALC-SCNC: 8 MMOL/L (ref 7–16)
BUN SERPL-MCNC: 18 MG/DL (ref 6–23)
CALCIUM SERPL-MCNC: 8.9 MG/DL (ref 8.3–10.4)
CHLORIDE SERPL-SCNC: 109 MMOL/L (ref 98–107)
CO2 SERPL-SCNC: 25 MMOL/L (ref 21–32)
CREAT SERPL-MCNC: 1.21 MG/DL (ref 0.8–1.5)
ERYTHROCYTE [DISTWIDTH] IN BLOOD BY AUTOMATED COUNT: 13.6 % (ref 11.9–14.6)
GLUCOSE SERPL-MCNC: 143 MG/DL (ref 65–100)
HCT VFR BLD AUTO: 39.6 % (ref 41.1–50.3)
HGB BLD-MCNC: 13.3 G/DL (ref 13.6–17.2)
MAGNESIUM SERPL-MCNC: 2 MG/DL (ref 1.8–2.4)
MCH RBC QN AUTO: 30.7 PG (ref 26.1–32.9)
MCHC RBC AUTO-ENTMCNC: 33.6 G/DL (ref 31.4–35)
MCV RBC AUTO: 91.5 FL (ref 79.6–97.8)
NRBC # BLD: 0 K/UL (ref 0–0.2)
PHOSPHATE SERPL-MCNC: 4.2 MG/DL (ref 2.5–4.5)
PLATELET # BLD AUTO: 325 K/UL (ref 150–450)
PMV BLD AUTO: 9.4 FL (ref 9.4–12.3)
POTASSIUM SERPL-SCNC: 4.5 MMOL/L (ref 3.5–5.1)
RBC # BLD AUTO: 4.33 M/UL (ref 4.23–5.6)
SODIUM SERPL-SCNC: 142 MMOL/L (ref 138–145)
WBC # BLD AUTO: 13.8 K/UL (ref 4.3–11.1)

## 2021-12-04 PROCEDURE — 84100 ASSAY OF PHOSPHORUS: CPT

## 2021-12-04 PROCEDURE — 74011000250 HC RX REV CODE- 250: Performed by: SURGERY

## 2021-12-04 PROCEDURE — 80048 BASIC METABOLIC PNL TOTAL CA: CPT

## 2021-12-04 PROCEDURE — 2709999900 HC NON-CHARGEABLE SUPPLY

## 2021-12-04 PROCEDURE — 74011000258 HC RX REV CODE- 258: Performed by: SURGERY

## 2021-12-04 PROCEDURE — 74011250636 HC RX REV CODE- 250/636: Performed by: SURGERY

## 2021-12-04 PROCEDURE — 83735 ASSAY OF MAGNESIUM: CPT

## 2021-12-04 PROCEDURE — 36415 COLL VENOUS BLD VENIPUNCTURE: CPT

## 2021-12-04 PROCEDURE — 85027 COMPLETE CBC AUTOMATED: CPT

## 2021-12-04 PROCEDURE — 65270000029 HC RM PRIVATE

## 2021-12-04 PROCEDURE — 74011250637 HC RX REV CODE- 250/637: Performed by: SURGERY

## 2021-12-04 RX ADMIN — KETOROLAC TROMETHAMINE 15 MG: 30 INJECTION, SOLUTION INTRAMUSCULAR; INTRAVENOUS at 11:12

## 2021-12-04 RX ADMIN — FAMOTIDINE 20 MG: 10 INJECTION INTRAVENOUS at 09:04

## 2021-12-04 RX ADMIN — PIPERACILLIN SODIUM AND TAZOBACTAM SODIUM 4.5 G: 4; .5 INJECTION, POWDER, LYOPHILIZED, FOR SOLUTION INTRAVENOUS at 03:04

## 2021-12-04 RX ADMIN — CELECOXIB 100 MG: 100 CAPSULE ORAL at 09:04

## 2021-12-04 RX ADMIN — PIPERACILLIN SODIUM AND TAZOBACTAM SODIUM 4.5 G: 4; .5 INJECTION, POWDER, LYOPHILIZED, FOR SOLUTION INTRAVENOUS at 12:02

## 2021-12-04 RX ADMIN — CELECOXIB 100 MG: 100 CAPSULE ORAL at 17:25

## 2021-12-04 RX ADMIN — KETOROLAC TROMETHAMINE 15 MG: 30 INJECTION, SOLUTION INTRAMUSCULAR; INTRAVENOUS at 05:10

## 2021-12-04 RX ADMIN — SODIUM CHLORIDE, SODIUM LACTATE, POTASSIUM CHLORIDE, AND CALCIUM CHLORIDE 100 ML/HR: 600; 310; 30; 20 INJECTION, SOLUTION INTRAVENOUS at 07:32

## 2021-12-04 RX ADMIN — ACETAMINOPHEN 1000 MG: 500 TABLET ORAL at 17:43

## 2021-12-04 RX ADMIN — ACETAMINOPHEN 1000 MG: 500 TABLET ORAL at 05:10

## 2021-12-04 RX ADMIN — ACETAMINOPHEN 1000 MG: 500 TABLET ORAL at 11:11

## 2021-12-04 RX ADMIN — ENOXAPARIN SODIUM 40 MG: 100 INJECTION SUBCUTANEOUS at 12:01

## 2021-12-04 RX ADMIN — ACETAMINOPHEN 1000 MG: 500 TABLET ORAL at 23:10

## 2021-12-04 RX ADMIN — FAMOTIDINE 20 MG: 10 INJECTION INTRAVENOUS at 21:37

## 2021-12-04 NOTE — PROGRESS NOTES
Reviewed notes for concerns      Per notes:    Preferred name:   Mai Abt    Lives locally    [employer] [address]    Works for [employer]    Prior code    No directives            Will continue to assess how to best serve this family

## 2021-12-04 NOTE — PROGRESS NOTES
H&P/Consult Note/Progress Note/Office Note:   Louie Esquivel MRN: [id number]  :1965  Age:56 y.o.    HPI: Louie Esquivel is a 49 y.o. male who is here for laparoscopic-assisted sigmoid colectomy with coloproctostomy on 12/3/21. He has a h/o recurrent diverticulitis and came to the ER on 10/14/21 with worsening severe LLQ pain that began on 10/8/21. He reported he was treated with Augmentin  for suspected diverticulitis, but the pain worsened despite the Augmentin. Nothing in particular made his symptoms better. He reported associated nausea and fever. He had a percutaneous drain placed for diverticulitis in . He is s/p [facility] with mesh in     He is s/p colonoscopy on 2017 by Dr. Sendy Hansen. The colonoscopy report indicates \"excavated lesions and rare, shallow diverticulum\" in sigmoid colon  Otherwise normal study, repeat was recommended in 10 yrs ()          10/14/21 CT abd/pelvis with oral and IV contrast  Hx: LLQ abd pain currently on antibiotics with persistent pain.      LOWER CHEST: Normal.     HEPATOBILIARY: Subtle low-attenuation liver lesions are present most likely cysts and too small to characterize by CT. These may be slightly larger than on the prior exam from 2017. No new liver lesions are appreciated. No calcified gallstones.     PANCREAS: Normal.  SPLEEN: Normal.  ADRENAL GLANDS: Normal.  KIDNEYS/BLADDER: Kidneys and bladder are unremarkable. No urinary tract calculi or hydronephrosis.     BOWEL: No evidence of bowel obstruction. Appendix is normal.   There is marked inflammation and wall thickening in the region of the proximal sigmoid colon with surrounding mesenteric inflammation. There is likely a small intramural fluid collection involving the wall of the colon. No extra colonic fluid collection or free air is evident.    Several small probable reactive mesenteric nodes are present in this area.     LYMPH NODES: Small left lower quadrant mesenteric lymph nodes in the area of the colonic inflammation and cortical thickening. Small retroperitoneal nodes are also present but are not enlarged by CT criteria and appear stable. No enlarged pelvic lymph nodes.     VASCULATURE: Unremarkable. PELVIC ORGANS: Prostate gland and rectum are unremarkable. No free pelvic fluid. MUSCULOSKELETAL: Normal.     IMPRESSION  Colonic inflammation proximal sigmoid colon with probable intramural abscess involving the wall of the sigmoid colon. This measures approximately 1 cm in diameter. No extracolonic abscess or free intraperitoneal air. Small adjacent reactive lymph nodes are present.           10/20/21 CT abd/pelvis (follow-up study)      LOWER CHEST: Atelectatic lung base changes. No pleural fluid.     HEPATOBILIARY: Scattered low-attenuation liver lesions appear stable, most  likely cysts. No definite new or enlarging liver lesions. No calcified  gallstones. Probable vicarious excretion of previously administered intravenous  contrast noted in the gallbladder. This is a normal anatomic variant.     PANCREAS: Normal.  SPLEEN: Normal.  ADRENAL GLANDS: Normal.  KIDNEYS/BLADDER: Kidneys and bladder are unremarkable. No urinary tract calculi or hydronephrosis.     BOWEL: Persistent inflammation noted in the region of patient's preceding described diverticulitis. Small fluid collection in the wall of the sigmoid colon on image 66 of series 2 has diminished in size now measuring approximately 0.7 cm. No new extra colonic fluid collection to indicate a mesenteric abscess. No free intraperitoneal air.     LYMPH NODES: Small retroperitoneal and left common iliac lymph nodes, unchanged since prior exam.   These are not enlarged by CT criteria.     VASCULATURE: Unremarkable. PELVIC ORGANS: Prostate gland and rectum are unremarkable. No significant free pelvic fluid. MUSCULOSKELETAL: Normal.     IMPRESSION  1.  Interval improvement involving the inflammation and intramural abscess proximal sigmoid colon. No new extra colonic fluid collection to indicate an abscess. No free intraperitoneal air. 2. Stable low-attenuation liver lesions most likely cysts. 3. Stable subcentimeter retroperitoneal and left common iliac lymph nodes. None are enlarged by CT criteria. No new or enlarging lymph nodes.         11/3/21 CT abd/pelvis with oral and IV contrast  *  LUNG BASES: Within normal limits. *  LIVER: Multiple stable simple cysts. *  GALLBLADDER AND BILE DUCTS: Normal.  *  SPLEEN: Within normal limits. *  URINARY TRACT: Within normal limits. *  ADRENALS: Within normal limits. *  PANCREAS: Within normal limits. *  GASTROINTESTINAL TRACT: Normal appendix. Sigmoid diverticulitis improved. No evidence of an abscess. *  RETROPERITONEUM: Within normal limits. *  PERITONEAL CAVITY AND ABDOMINAL WALL: Within normal limits. *  PELVIS: Within normal limits. *  SPINE / BONES: Within normal limits. *  OTHER COMMENTS: None.     IMPRESSION  Sigmoid diverticulitis improved. No evidence for an abscess. Numerous stable hepatic cysts.              Additional hx:  10/15/21 c/o headache; AF/VSS; LLQ pain; WBC 12.6--->10.4k;  IV Cipro/Flagyl/ Bowel rest  10/16/21 LLQ pain +flatus; IV Abx  10/17/21 LLQ pain better IV Abx  10/18/21 LLQ pain resolved at present; AF; WBC normal; Hold off on TPN/PICC; Repeat CT Wednesday  10/19/21 No change- LLQ pain resolved; repeat CT tomorrow   10/20/21 feels OK; repeat CT today shows abscess 10mm--->now 7mm; Plan TPN for nutritional support with non-functioning GI tract; PICC line was placed  10/21/22 TPN begins today; Request insurance authorization for home TPN for bowel rest as outpt until the sigmoid colon wall abscess (which cannot be safely drained by IR) resolves    11/15/21 office visit; NO pain; no fevers;  Tolerating PO; PICC out; off abx; didn't want surgery  11/22/21 office visit; he now wants to proceed with sigmoid resection. 12/3/21 OR; Here for same day admission for sigmoid colon resection    12/4/21 POD1: Pt awake in bed. No complaints. Pain controlled. LORRIE drain 60mL serosanguineous output. AF, NAD.          Past Medical History:   Diagnosis Date    Diverticulosis     H/O seasonal allergies     Unspecified sleep apnea     wears cpap     Past Surgical History:   Procedure Laterality Date    COLONOSCOPY N/A 12/13/2017    COLONOSCOPY  BMI 31 performed by Goldie Randle MD at [facility] HX HERNIA REPAIR      HX ORTHOPAEDIC Right 2009    rolled bicep    ID ABDOMEN SURGERY PROC UNLISTED  2008    perc drain diverticular abscess    ID COLONOSCOPY FLX DX W/COLLJ SPEC WHEN PFRMD  12/13/2017          Current Facility-Administered Medications   Medication Dose Route Frequency    lactated Ringers infusion  100 mL/hr IntraVENous CONTINUOUS    acetaminophen (TYLENOL) tablet 1,000 mg  1,000 mg Oral Q6H    celecoxib (CELEBREX) capsule 100 mg  100 mg Oral BID    naloxone (NARCAN) injection 0.4 mg  0.4 mg IntraVENous PRN    oxyCODONE IR (ROXICODONE) tablet 5 mg  5 mg Oral Q4H PRN    lidocaine 8 mg/mL (Xylocaine) infusion  1 mg/kg/hr (Ideal) IntraVENous CONTINUOUS    fat emulsion 20% (LIPOSYN, INTRAlipid) infusion  0.25-1.5 mL/kg/min IntraVENous PRN    ondansetron (ZOFRAN ODT) tablet 4 mg  4 mg Oral Q4H PRN    enoxaparin (LOVENOX) injection 40 mg  40 mg SubCUTAneous Q24H    ondansetron (ZOFRAN) injection 4 mg  4 mg IntraVENous Q4H PRN    famotidine (PF) (PEPCID) 20 mg in 0.9% sodium chloride 10 mL injection  20 mg IntraVENous Q12H    piperacillin-tazobactam (ZOSYN) 4.5 g in 0.9% sodium chloride (MBP/ADV) 100 mL MBP  4.5 g IntraVENous Q8H     Darvocet a500 [propoxyphene n-acetaminophen]  Social History     Socioeconomic History    Marital status:    Tobacco Use    Smoking status: Never Smoker    Smokeless tobacco: Never Used   Substance and Sexual Activity    Alcohol use: No    Drug use: No    Sexual activity: Yes     Partners: Female     Social History     Tobacco Use   Smoking Status Never Smoker   Smokeless Tobacco Never Used     Family History   Problem Relation Age of Onset   [place] Self Diabetes Mother    [place] Self COPD Mother     Kidney Disease Mother     Heart Disease Mother     Hypertension Mother     Cancer Father         prostate    Other Father         abdominal aortic aneurysm    Sleep Apnea Father     Diabetes Maternal Grandmother     Diabetes Sister     Malignant Hyperthermia Neg Hx     Pseudocholinesterase Deficiency Neg Hx     Delayed Awakening Neg Hx     Post-op Nausea/Vomiting Neg Hx     Emergence Delirium Neg Hx     Post-op Cognitive Dysfunction Neg Hx      ROS: The patient has no difficulty with chest pain or shortness of breath. No fever or chills. Comprehensive review of systems was otherwise unremarkable except as noted above. Physical Exam:   Visit Vitals  /74   Pulse (!) 105   Temp 98.4 °F (36.9 °C)   Resp 18   Ht 5' 7\" (1.702 m)   Wt 189 lb 12.8 oz (86.1 kg)   SpO2 93%   BMI 29.73 kg/m²     Vitals:    12/03/21 2318 12/04/21 0400 12/04/21 0532 12/04/21 0701   BP: (!) 145/78 119/73  131/74   Pulse: (!) 112 98  (!) 105   Resp: 17 18  18   Temp: 99.4 °F (37.4 °C) 99 °F (37.2 °C)  98.4 °F (36.9 °C)   SpO2: 93% 93%  93%   Weight:   189 lb 12.8 oz (86.1 kg)    Height:         10/20 1901 - 10/21 0700  In: -   Out: 200 [Urine:200]  10/19 0701 - 10/20 1900  In: 468 [I.V.:468]  Out: 1600 [Urine:1600]    Constitutional: Alert, oriented, cooperative patient in no acute distress; appears stated age    Eyes:Sclera are clear. EOMs intact  ENMT: no external lesions gross hearing normal; no obvious neck masses, no ear or lip lesions, nares normal  CV: RRR. Normal perfusion  Resp: No JVD. Breathing is  non-labored; no audible wheezing. GI: soft and non-distended; appropriately-tender. Dressing c/d/i, LORRIE   Musculoskeletal: unremarkable with normal function. No embolic signs or cyanosis. Neuro: Oriented; moves all 4; no focal deficits  Psychiatric: normal affect and mood, no memory impairment    Recent vitals (if inpt):  Patient Vitals for the past 24 hrs:   BP Temp Pulse Resp SpO2   10/21/21 0324 105/61 97.6 °F (36.4 °C) 67 17 97 %   10/20/21 2329 133/72 97 °F (36.1 °C) 67 17 93 %   10/20/21 1944 (!) 155/83 98.3 °F (36.8 °C) 66 18 95 %   10/20/21 1519 134/71 98.1 °F (36.7 °C) 70 18 95 %   10/20/21 1141 120/81 98.6 °F (37 °C) 64 18 95 %   10/20/21 0743 128/71 98.1 °F (36.7 °C) 68 18 98 %       Amount and/or Complexity of Data Reviewed and Analyzed:  I reviewed and analyzed all of the unique labs and radiologic studies that are shown below as well as any that are in the HPI, and any that are in the expanded problem list below  *Each unique test, order, or document contributes to the combination of 2 or combination of 3 in Category 1 below. For this visit I also reviewed old records and prior notes.       Recent Labs     12/04/21  0508   WBC 13.8*   HGB 13.3*         K 4.5   *   CO2 25   BUN 18   CREA 1.21   *     Review of most recent CBC  Lab Results   Component Value Date/Time    WBC 13.8 (H) 12/04/2021 05:08 AM    HGB 13.3 (L) 12/04/2021 05:08 AM    HCT 39.6 (L) 12/04/2021 05:08 AM    PLATELET 828 92/04/6708 05:08 AM    MCV 91.5 12/04/2021 05:08 AM       Review of most recent BMP  Lab Results   Component Value Date/Time    Sodium 142 12/04/2021 05:08 AM    Potassium 4.5 12/04/2021 05:08 AM    Chloride 109 (H) 12/04/2021 05:08 AM    CO2 25 12/04/2021 05:08 AM    Anion gap 8 12/04/2021 05:08 AM    Glucose 143 (H) 12/04/2021 05:08 AM    BUN 18 12/04/2021 05:08 AM    Creatinine 1.21 12/04/2021 05:08 AM    BUN/Creatinine ratio 15 10/19/2020 07:21 AM    GFR est AA >60 12/04/2021 05:08 AM    GFR est non-AA >60 12/04/2021 05:08 AM    Calcium 8.9 12/04/2021 05:08 AM       Review of most recent LFTs (and lipase if done)  Lab Results   Component Value Date/Time    ALT (SGPT) 47 11/29/2021 01:40 PM    AST (SGOT) 31 11/29/2021 01:40 PM    Alk. phosphatase 61 11/29/2021 01:40 PM    Bilirubin, direct 0.1 10/21/2021 04:06 AM    Bilirubin, total 0.3 11/29/2021 01:40 PM     Lab Results   Component Value Date/Time    Lipase 56 (L) 10/14/2021 03:02 PM       Lab Results   Component Value Date/Time    INR  05/12/2008 08:55 PM     1.1  Suggested therapeutic INR range:  Venous thrombosis and embolus            2.0-3.0  Prosthetic heart valve                   2.5-3.5    aPTT 30.5  HEPARIN THERAPY RANGE:  48 - 75 SECONDS 05/12/2008 08:55 PM    Bilirubin, direct 0.1 10/21/2021 04:06 AM       Review of most recent HgbA1c  No results found for: HBA1C, OVM1XYMT, WOW4DHIW, TPA4SUFK    Nutritional assessment screen for wound healing issues:  Lab Results   Component Value Date/Time    Protein, total 8.3 (H) 11/29/2021 01:40 PM    Albumin 3.6 11/29/2021 01:40 PM       @lastcovr@  XR Results (most recent):  Results from Appointment encounter on 03/31/21    XR KNEE RT MIN 4 V    Narrative  AUTOMATIC ADMINISTRATIVE RESULT    The result for this exam can be found in the Progress note in the chart. Impression  :  See Progress note in the chart. CT Results (most recent):  Results from Hospital Encounter encounter on 11/03/21    CT ABD PELV W CONT    Narrative  CT ABDOMEN AND PELVIS WITH CONTRAST    HISTORY: Acute diverticulitis    COMPARISON: 10/20/2021    TECHNIQUE: Helical imaging was performed from the lung bases through the ischial  tuberosities during the intravenous infusion of 100 cc of Isovue-370. Oral  contrast was administered. Coronal and sagittal reformats were performed. Dose reduction techniques used: Automated exposure control, adjustment of the  mAs and/or kVp according to patient's size, and iterative reconstruction  techniques. FINDINGS:  *  LUNG BASES: Within normal limits. *  LIVER: Multiple stable simple cysts.   *  GALLBLADDER AND BILE DUCTS: Normal.  *  SPLEEN: Within normal limits. *  URINARY TRACT: Within normal limits. *  ADRENALS: Within normal limits. *  PANCREAS: Within normal limits. *  GASTROINTESTINAL TRACT: Normal appendix. Sigmoid diverticulitis improved. No  evidence of an abscess. *  RETROPERITONEUM: Within normal limits. *  PERITONEAL CAVITY AND ABDOMINAL WALL: Within normal limits. *  PELVIS: Within normal limits. *  SPINE / BONES: Within normal limits. *  OTHER COMMENTS: None. Impression  Sigmoid diverticulitis improved. No evidence for an abscess. Numerous stable hepatic cysts. Date of Dictation: 11/3/2021 9:11 AM    US Results (most recent):  No results found for this or any previous visit.         Admission date (for inpatients): 12/3/2021   * No surgery found *  Procedure(s):  ERAS/COLON RESECTION SIGMOID LAPAROSCOPIC  HAND ASSISTED WITH  ANASTOMOSIS  COLOPROCTOSCOPY        ASSESSMENT/PLAN:  Problem List  Date Reviewed: 11/22/2021          Codes Class Noted    * (Principal) Diverticulitis of sigmoid colon ICD-10-CM: K57.32  ICD-9-CM: 562.11  12/3/2021        Mild protein-calorie malnutrition ([facility] [address] ICD-10-CM: E44.1  ICD-9-CM: 263.1  10/21/2021        Headache ICD-10-CM: R51.9  ICD-9-CM: 784.0  10/15/2021        Sigmoid diverticulitis ICD-10-CM: K57.32  ICD-9-CM: 562.11  10/14/2021        Abscess of sigmoid colon due to diverticulitis ICD-10-CM: K57.20  ICD-9-CM: 562.11, 569.5  10/14/2021        LLQ pain ICD-10-CM: R10.32  ICD-9-CM: 789.04  10/14/2021        Obesity (BMI 30.0-34.9) ICD-10-CM: E66.9  ICD-9-CM: 278.00  10/5/2020        Transient disorder of initiating or maintaining sleep ICD-10-CM: F51.02  ICD-9-CM: 307.41  10/5/2020        Hyperlipemia ICD-10-CM: E78.5  ICD-9-CM: 272.4  8/6/2014        FRENCH on CPAP ICD-10-CM: G47.33, Z99.89  ICD-9-CM: 327.23, V46.8  8/6/2014        HA (headache) ICD-10-CM: R51.9  ICD-9-CM: 784.0  8/6/2014        FH: prostate cancer ICD-10-CM: Z80.42  ICD-9-CM: V16.42  8/6/2014        FHx: AAA ICD-10-CM: XOM0951  ICD-9-CM: XEF1850  8/6/2014        Allergic rhinitis ICD-10-CM: J30.9  ICD-9-CM: 477.9  8/6/2014        Diverticulosis of colon without diverticulitis ICD-10-CM: K57.30  ICD-9-CM: 562.10  8/6/2014            Principal Problem:    Diverticulitis of sigmoid colon (12/3/2021)           Number and Complexity of Problems addressed and   Risks of complications and/or morbidity of management    He is here for laparoscopic-assisted sigmoid colectomy with coloproctostomy on 12/3/21  He remained on oral antibiotics leading up to surgery and had an outpt bowel prep the day before surgery including erythromycin and neomycin. He was told to discontinue the ciprofloxacin the day before surgery to avoid any interaction with erythromycin and his QT intervals    Informed consent was obtained for laparoscopic assisted sigmoid colectomy with coloproctostomy. Procedure risks were discussed including bleeding, infection, anastomotic leak requiring colostomy, injury to ureter, small bowel obstruction, hernia,   the possibility of finding malignancy in the colon specimen,  blood clots, risk of anesthesia, etc.. All his questions were answered. He was in favor proceeding.           Recurrent diverticulitis complicated by recent suspected 1cm intramural abscess in sigmoid colon    POD1  Keep drain  Pain control  IV Abx - Zosyn  NPO - will start sips on Sunday        Stan Chand NP

## 2021-12-04 NOTE — OP NOTES
[facility]  OPERATIVE REPORT    Name:  Jerome Bassett  MR#:  [id number]  :  1965  ACCOUNT #:  [de-identified]  DATE OF SERVICE:  2021    PREOPERATIVE DIAGNOSIS:  Recurrent complicated sigmoid diverticulitis. POSTOPERATIVE DIAGNOSIS:  Recurrent complicated sigmoid diverticulitis. PROCEDURE PERFORMED:  Laparoscopic-assisted sigmoid colectomy with coloproctostomy (CPT 55926). SURGEON:  Ankur Whitlock MD    ASSISTANT:  None. ANESTHESIA:  General.    COMPLICATIONS:  None. SPECIMENS REMOVED:  Sigmoid colon marked for orientation, sent to Pathology for review. IMPLANTS:  A 19-round Jacobo drain. ESTIMATED BLOOD LOSS:  Less than 50 mL. OPERATIVE PROCEDURE:  The patient was taken to the operating room, placed in supine position. After adequate anesthesia was given, he was placed in supine lithotomy position with SCD boots and a Sesay catheter. Eduardo stirrups were used. His abdomen and perineum were prepped and draped in sterile fashion with multiple layers of Betadine scrub and Betadine solution. Time-out protocol was followed. He was given prophylactic antibiotics. He had been given an outpatient bowel prep today prior to surgery including oral antibiotics. A small midline subumbilical incision was made, dissecting down to fascia and the peritoneum was opened. GelPort was placed with a 11-mm trocar to allow laparoscopy. Insufflation was achieved. A 10-mm 30 degree scope was inserted and visualization of the abdominal cavity identified adhesions in the left lower quadrant. A 5-mm trocar was placed in the suprapubic region. The sigmoid colon was thickened. The sigmoid colon was mobilized from the left lower quadrant abdominal wall with a 5-mm LigaSure. The descending colon was mobilized up to the splenic flexure by incising the white line of Toldt.   We could palpate soft rectum and palpate soft descending colon, but the sigmoid colon was markedly thickened. A 75-mm blue KRISHAN stapler was used to divide the colon distal to the thickening at the upper rectum. A second firing of the staple was placed across the descending colon. The mesentery was divided with a LigaSure. The sigmoid colon was marked with a suture at the distal margin and sent to Pathology for review. It was thickened and consistent with chronic diverticulitis. In mobilizing the sigmoid colon, we took great care not to injure the left ureter which was identified and protected. We did encounter some purulent fluid, which was aspirated too quickly to allow a culture in the left lower quadrant where the patient had a sigmoid colon wall abscess previously. Irrigation was used. We performed an end-to-end anastomosis with an outer layer of interrupted 3-0 silk sutures and an inner layer of running 3-0 Vicryl suture in a locking fashion on the posterior inner wall and using canal stitch on the anterior inner wall. The outer wall was completed with interrupted 3-0 silk sutures in a Lembert fashion. Irrigation was used. Hemostasis was confirmed. There was no tension on the anastomosis. A 19-round Jaocbo drain was brought in through a counter incision in the left lower quadrant, placed in the pelvis with a portion of the drain traversing adjacent to the anastomosis. The small bowel was placed in its normal anatomic position. Sponge counts were correct. The fascia was closed with running looped #1 PDS suture. Subcutaneous adipose was irrigated. The skin was closed with clips. A 2-0 silk was used to secure the drain at the skin exit site. Sterile dressings were placed. The patient tolerated the procedure well. There were no immediate complications.         Ida Mckeon MD [id number]  D:  12/03/2021 17:37  T:  12/03/2021 23:52  JOB #:  [id number]

## 2021-12-04 NOTE — PROGRESS NOTES
12/03/21 1930   Dual Skin Pressure Injury Assessment   Dual Skin Pressure Injury Assessment WDL   Second Care Provider (Based on Facility Policy) GLENDA Martin   Skin Integumentary   Skin Integumentary (WDL) X   Skin Color Appropriate for ethnicity   Skin Condition/Temp Warm; Dry   Skin Integrity Other (comment);  Incision (comment)  (midline abd incision)   Wound Prevention and Protection Methods   Orientation of Wound Prevention Posterior   Location of Wound Prevention Sacrum/Coccyx   Dressing Present  No Medication Changes:  No medication changes at this time. Please continue current medication regiment.    Patient Instructions:    Check-In  Time Check-In Location Estimated Length Procedure   Name        GOLD  waiting room  minutes Right and Left Heart Cath**     Procedure Preparations & Instructions     This is an invasive procedure that DOES require preparation:    - Nothing to eat or drink for 6 hours   - A ride should be arranged for you as you will be unable to drive home.  You will be required to lay flat for approximately 2-4 hours in the recovery unit to ensure proper clotting of the artery.  - Take 325 mg of Asprin 24 hours prior to procedure and morning of prior.      *For Patients with Diabetes: ? DO NOT take any oral diabetic medication, short-acting diabetes medications/insulin, humalog or regular insulin the morning of your test  ? Take   dose of long-acting insulin (Lantus, Levemir) the day of your test  ? Hold Oral Diabetic on the day of the procedure and for 48 hours after IV contrast given  ? Remember to  bring your glucometer and insulin with you to take after your test if needed     *For Patients on anticoagulants: ? Your Coumadin Clinic will give you instructions on medication adjustments or bridging prior to the procedure        Follow up Appointment Information:  After testing    We are located on the third floor of [facility] on the [facility].  Our address is     [address] [place] [address] on [address] [place] [address]    Thank you for allowing us to be a part of your care here at the [facility]    If you have questions or concerns please contact us at:    Armida Pastor RN, BSN    Hermann Laguerre (Schedule,P.A.)  Nurse Coordinator     Clinic   Pulmonary Hypertension   Pulmonary Hypertension  [facility]  (P)[phone]    (P) [phone]        (F)[phone]    ** Please note that you will NOT receive a reminder call regarding your scheduled testing, reminder calls are for provider appointments only.  If you are scheduled for testing within the [facility] system you may receive a call regarding pre-registration for billing purposes only.**     Remember to weigh yourself daily after voiding and before you consume any food or beverages and log the numbers.  If you have gained/lost 2 pounds overnight or 5 pounds in a week contact us immediately for medication adjustments or further instructions.

## 2021-12-05 LAB
ANION GAP SERPL CALC-SCNC: 5 MMOL/L (ref 7–16)
BUN SERPL-MCNC: 23 MG/DL (ref 6–23)
CALCIUM SERPL-MCNC: 8.9 MG/DL (ref 8.3–10.4)
CHLORIDE SERPL-SCNC: 109 MMOL/L (ref 98–107)
CO2 SERPL-SCNC: 28 MMOL/L (ref 21–32)
CREAT SERPL-MCNC: 0.98 MG/DL (ref 0.8–1.5)
ERYTHROCYTE [DISTWIDTH] IN BLOOD BY AUTOMATED COUNT: 13.8 % (ref 11.9–14.6)
GLUCOSE SERPL-MCNC: 95 MG/DL (ref 65–100)
HCT VFR BLD AUTO: 38.2 % (ref 41.1–50.3)
HGB BLD-MCNC: 12.1 G/DL (ref 13.6–17.2)
MCH RBC QN AUTO: 29.4 PG (ref 26.1–32.9)
MCHC RBC AUTO-ENTMCNC: 31.7 G/DL (ref 31.4–35)
MCV RBC AUTO: 92.7 FL (ref 79.6–97.8)
NRBC # BLD: 0 K/UL (ref 0–0.2)
PLATELET # BLD AUTO: 254 K/UL (ref 150–450)
PMV BLD AUTO: 9.2 FL (ref 9.4–12.3)
POTASSIUM SERPL-SCNC: 3.9 MMOL/L (ref 3.5–5.1)
RBC # BLD AUTO: 4.12 M/UL (ref 4.23–5.6)
SODIUM SERPL-SCNC: 142 MMOL/L (ref 138–145)
WBC # BLD AUTO: 11.3 K/UL (ref 4.3–11.1)

## 2021-12-05 PROCEDURE — 65270000029 HC RM PRIVATE

## 2021-12-05 PROCEDURE — 74011250636 HC RX REV CODE- 250/636: Performed by: SURGERY

## 2021-12-05 PROCEDURE — 74011000250 HC RX REV CODE- 250: Performed by: SURGERY

## 2021-12-05 PROCEDURE — 2709999900 HC NON-CHARGEABLE SUPPLY

## 2021-12-05 PROCEDURE — 85027 COMPLETE CBC AUTOMATED: CPT

## 2021-12-05 PROCEDURE — 74011250636 HC RX REV CODE- 250/636: Performed by: NURSE PRACTITIONER

## 2021-12-05 PROCEDURE — 74011250637 HC RX REV CODE- 250/637: Performed by: SURGERY

## 2021-12-05 PROCEDURE — 80048 BASIC METABOLIC PNL TOTAL CA: CPT

## 2021-12-05 PROCEDURE — 36415 COLL VENOUS BLD VENIPUNCTURE: CPT

## 2021-12-05 RX ORDER — SODIUM CHLORIDE, SODIUM LACTATE, POTASSIUM CHLORIDE, CALCIUM CHLORIDE 600; 310; 30; 20 MG/100ML; MG/100ML; MG/100ML; MG/100ML
100 INJECTION, SOLUTION INTRAVENOUS CONTINUOUS
Status: DISCONTINUED | OUTPATIENT
Start: 2021-12-05 | End: 2021-12-06

## 2021-12-05 RX ADMIN — ACETAMINOPHEN 1000 MG: 500 TABLET ORAL at 17:21

## 2021-12-05 RX ADMIN — ENOXAPARIN SODIUM 40 MG: 100 INJECTION SUBCUTANEOUS at 12:33

## 2021-12-05 RX ADMIN — ACETAMINOPHEN 1000 MG: 500 TABLET ORAL at 12:34

## 2021-12-05 RX ADMIN — SODIUM CHLORIDE, SODIUM LACTATE, POTASSIUM CHLORIDE, AND CALCIUM CHLORIDE 100 ML/HR: 600; 310; 30; 20 INJECTION, SOLUTION INTRAVENOUS at 09:00

## 2021-12-05 RX ADMIN — CELECOXIB 100 MG: 100 CAPSULE ORAL at 08:32

## 2021-12-05 RX ADMIN — SODIUM CHLORIDE, SODIUM LACTATE, POTASSIUM CHLORIDE, AND CALCIUM CHLORIDE 100 ML/HR: 600; 310; 30; 20 INJECTION, SOLUTION INTRAVENOUS at 17:22

## 2021-12-05 RX ADMIN — ACETAMINOPHEN 1000 MG: 500 TABLET ORAL at 23:14

## 2021-12-05 RX ADMIN — FAMOTIDINE 20 MG: 10 INJECTION INTRAVENOUS at 21:52

## 2021-12-05 RX ADMIN — ACETAMINOPHEN 1000 MG: 500 TABLET ORAL at 06:00

## 2021-12-05 RX ADMIN — FAMOTIDINE 20 MG: 10 INJECTION INTRAVENOUS at 08:31

## 2021-12-05 RX ADMIN — CELECOXIB 100 MG: 100 CAPSULE ORAL at 17:21

## 2021-12-05 NOTE — PROGRESS NOTES
ERAS End of Shift    1 Day Post-Op     Sesay: No    Bowel Movement: No    Bowel Sounds: hypoactive    DIET NPO     Tolerating Diet?: NPO    Ambulated 3 times.     Up to chair for x 1 for 4 hours    Lidocaine: No    PRN Pain Medications Used?: No    IS Used: Yes    Ideal body weight: 66.1 kg (145 lb 11.6 oz)     Signed By: Yen Newell RN     December 4, 2021

## 2021-12-05 NOTE — PROGRESS NOTES
Problem: General Infection Care Plan (Adult and Pediatric)  Goal: Improvement in signs and symptoms of infection  Outcome: Progressing Towards Goal  Goal: *Optimize nutritional status  Outcome: Progressing Towards Goal     Problem: Patient Education: Go to Patient Education Activity  Goal: Patient/Family Education  Outcome: Progressing Towards Goal     Problem: Falls - Risk of  Goal: *Absence of Falls  Description: Document Bard  Fall Risk and appropriate interventions in the flowsheet.   Outcome: Progressing Towards Goal  Note: Fall Risk Interventions:            Medication Interventions: Patient to call before getting OOB    Elimination Interventions: Call light in reach, Patient to call for help with toileting needs              Problem: Patient Education: Go to Patient Education Activity  Goal: Patient/Family Education  Outcome: Progressing Towards Goal     Problem: Patient Education: Go to Patient Education Activity  Goal: Patient/Family Education  Outcome: Progressing Towards Goal     Problem: ERAS-Colorectal Surgery:Post-op Day 2 through Discharge  Goal: Off Pathway (Use only if patient is Off Pathway)  Outcome: Progressing Towards Goal  Goal: Activity/Safety  Outcome: Progressing Towards Goal  Goal: Diagnostic Test/Procedures  Outcome: Progressing Towards Goal  Goal: Nutrition/Diet  Outcome: Progressing Towards Goal  Goal: Discharge Planning  Outcome: Progressing Towards Goal  Goal: Medications  Outcome: Progressing Towards Goal  Goal: Respiratory  Outcome: Progressing Towards Goal  Goal: Treatments/Interventions/Procedures  Outcome: Progressing Towards Goal  Goal: Psychosocial  Outcome: Progressing Towards Goal  Goal: *No signs and symptoms of infection or wound complications  Outcome: Progressing Towards Goal  Goal: *Optimal pain control at patient's stated goal  Outcome: Progressing Towards Goal  Goal: *Adequate urinary output  Outcome: Progressing Towards Goal  Goal: *Hemodynamically stable  Outcome: Progressing Towards Goal  Goal: *Tolerating diet  Outcome: Progressing Towards Goal  Goal: *Demonstrates progressive activity  Outcome: Progressing Towards Goal  Goal: *Lungs clear or at baseline  Outcome: Progressing Towards Goal     Problem: ERAS-Colorectal Surger:Discharge Outcomes  Goal: *Hemodynamically stable  Outcome: Progressing Towards Goal  Goal: *Lungs clear or at baseline  Outcome: Progressing Towards Goal  Goal: *Demonstrates independent activity or return to baseline  Outcome: Progressing Towards Goal  Goal: *Optimal pain control at patient's stated goal  Outcome: Progressing Towards Goal  Goal: *Verbalizes understanding and describes prescribed diet  Outcome: Progressing Towards Goal  Goal: *Tolerating diet  Outcome: Progressing Towards Goal  Goal: *Verbalizes name, dosage, time, side effects, and number of days to continue medications  Outcome: Progressing Towards Goal  Goal: *No signs and symptoms of infection or wound complications  Outcome: Progressing Towards Goal  Goal: *Anxiety reduced or absent  Outcome: Progressing Towards Goal  Goal: *Understands and describes signs and symptoms to report to providers(Stroke Metric)  Outcome: Progressing Towards Goal  Goal: *Describes follow-up/return visits to physicians  Outcome: Progressing Towards Goal  Goal: *Describes available resources and support systems  Outcome: Progressing Towards Goal

## 2021-12-05 NOTE — PROGRESS NOTES
END OF SHIFT NOTE:    INTAKE/OUTPUT  12/04 0701 - 12/05 0700  In: 2289 [I.V.:2289]  Out: 0888 [Urine:1025; Drains:35]  Voiding: YES  Catheter: NO  Drain:   Andrew Aden #1 12/03/21 Left; Lower Abdomen (Active)   Site Assessment Clean, dry, & intact 12/05/21 0250   Dressing Status Clean, dry, & intact 12/05/21 0250   Drainage Description Sanguinous 12/05/21 0250   Jacobo Drain Airleak No 12/05/21 0250   Status Patent; Charged; Draining 12/05/21 0250   Y Connector Used No 12/05/21 0250   Output (ml) 30 ml 12/05/21 3758               Flatus: Patient does not have flatus present. Stool:  0 occurrences. Characteristics:       Emesis: 0 occurrences. Characteristics:        VITAL SIGNS  Patient Vitals for the past 12 hrs:   Temp Pulse Resp BP SpO2   12/05/21 0351 98 °F (36.7 °C) 73 18 121/73 93 %   12/04/21 2325 98.4 °F (36.9 °C) 82 18 122/79 92 %   12/04/21 2030 98.5 °F (36.9 °C) 75 18 123/80 94 %       Pain Assessment  Pain Intensity 1: 0 (12/05/21 0250)  Pain Location 1: Abdomen  Pain Intervention(s) 1: Declines  Patient Stated Pain Goal: 0    Ambulating  Yes    Shift report given to oncoming nurse at the bedside.     David San RN

## 2021-12-05 NOTE — PROGRESS NOTES
ERAS End of Shift    2 Days Post-Op     Sesay: No    Bowel Movement: Yes    Bowel Sounds: normal    DIET NPO     Tolerating Diet?: Yes    Ambulated 3 times.     Up to chair for npo    Lidocaine: No    PRN Pain Medications Used?: No    IS Used: Yes    Ideal body weight: 66.1 kg (145 lb 11.6 oz)     Signed By: Selina Orosco RN     December 5, 2021

## 2021-12-05 NOTE — PROGRESS NOTES
ERAS End of Shift    2 Days Post-Op     Sesay: No    Bowel Movement: No    Bowel Sounds: normal    DIET NPO     Tolerating Diet?: Yes: NPO sips of water tolerated with med    Ambulated 3 times. Up to chair for 3 meals.     Lidocaine: No    PRN Pain Medications Used?: No    IS Used: Yes    Ideal body weight: 66.1 kg (145 lb 11.6 oz)     Signed By: David San RN     December 5, 2021

## 2021-12-05 NOTE — PROGRESS NOTES
H&P/Consult Note/Progress Note/Office Note:   Micheline Bethea MRN: [id number]  :1965  Age:56 y.o.    HPI: Micheline Bethea is a 49 y.o. male who is here for laparoscopic-assisted sigmoid colectomy with coloproctostomy on 12/3/21. He has a h/o recurrent diverticulitis and came to the ER on 10/14/21 with worsening severe LLQ pain that began on 10/8/21. He reported he was treated with Augmentin  for suspected diverticulitis, but the pain worsened despite the Augmentin. Nothing in particular made his symptoms better. He reported associated nausea and fever. He had a percutaneous drain placed for diverticulitis in . He is s/p [facility] with mesh in     He is s/p colonoscopy on 2017 by Dr. Rockwell Meigs. The colonoscopy report indicates \"excavated lesions and rare, shallow diverticulum\" in sigmoid colon  Otherwise normal study, repeat was recommended in 10 yrs ()          10/14/21 CT abd/pelvis with oral and IV contrast  Hx: LLQ abd pain currently on antibiotics with persistent pain.      LOWER CHEST: Normal.     HEPATOBILIARY: Subtle low-attenuation liver lesions are present most likely cysts and too small to characterize by CT. These may be slightly larger than on the prior exam from 2017. No new liver lesions are appreciated. No calcified gallstones.     PANCREAS: Normal.  SPLEEN: Normal.  ADRENAL GLANDS: Normal.  KIDNEYS/BLADDER: Kidneys and bladder are unremarkable. No urinary tract calculi or hydronephrosis.     BOWEL: No evidence of bowel obstruction. Appendix is normal.   There is marked inflammation and wall thickening in the region of the proximal sigmoid colon with surrounding mesenteric inflammation. There is likely a small intramural fluid collection involving the wall of the colon. No extra colonic fluid collection or free air is evident.    Several small probable reactive mesenteric nodes are present in this area.     LYMPH NODES: Small left lower quadrant mesenteric lymph nodes in the area of the colonic inflammation and cortical thickening. Small retroperitoneal nodes are also present but are not enlarged by CT criteria and appear stable. No enlarged pelvic lymph nodes.     VASCULATURE: Unremarkable. PELVIC ORGANS: Prostate gland and rectum are unremarkable. No free pelvic fluid. MUSCULOSKELETAL: Normal.     IMPRESSION  Colonic inflammation proximal sigmoid colon with probable intramural abscess involving the wall of the sigmoid colon. This measures approximately 1 cm in diameter. No extracolonic abscess or free intraperitoneal air. Small adjacent reactive lymph nodes are present.           10/20/21 CT abd/pelvis (follow-up study)      LOWER CHEST: Atelectatic lung base changes. No pleural fluid.     HEPATOBILIARY: Scattered low-attenuation liver lesions appear stable, most  likely cysts. No definite new or enlarging liver lesions. No calcified  gallstones. Probable vicarious excretion of previously administered intravenous  contrast noted in the gallbladder. This is a normal anatomic variant.     PANCREAS: Normal.  SPLEEN: Normal.  ADRENAL GLANDS: Normal.  KIDNEYS/BLADDER: Kidneys and bladder are unremarkable. No urinary tract calculi or hydronephrosis.     BOWEL: Persistent inflammation noted in the region of patient's preceding described diverticulitis. Small fluid collection in the wall of the sigmoid colon on image 66 of series 2 has diminished in size now measuring approximately 0.7 cm. No new extra colonic fluid collection to indicate a mesenteric abscess. No free intraperitoneal air.     LYMPH NODES: Small retroperitoneal and left common iliac lymph nodes, unchanged since prior exam.   These are not enlarged by CT criteria.     VASCULATURE: Unremarkable. PELVIC ORGANS: Prostate gland and rectum are unremarkable. No significant free pelvic fluid. MUSCULOSKELETAL: Normal.     IMPRESSION  1.  Interval improvement involving the inflammation and intramural abscess proximal sigmoid colon. No new extra colonic fluid collection to indicate an abscess. No free intraperitoneal air. 2. Stable low-attenuation liver lesions most likely cysts. 3. Stable subcentimeter retroperitoneal and left common iliac lymph nodes. None are enlarged by CT criteria. No new or enlarging lymph nodes.         11/3/21 CT abd/pelvis with oral and IV contrast  *  LUNG BASES: Within normal limits. *  LIVER: Multiple stable simple cysts. *  GALLBLADDER AND BILE DUCTS: Normal.  *  SPLEEN: Within normal limits. *  URINARY TRACT: Within normal limits. *  ADRENALS: Within normal limits. *  PANCREAS: Within normal limits. *  GASTROINTESTINAL TRACT: Normal appendix. Sigmoid diverticulitis improved. No evidence of an abscess. *  RETROPERITONEUM: Within normal limits. *  PERITONEAL CAVITY AND ABDOMINAL WALL: Within normal limits. *  PELVIS: Within normal limits. *  SPINE / BONES: Within normal limits. *  OTHER COMMENTS: None.     IMPRESSION  Sigmoid diverticulitis improved. No evidence for an abscess. Numerous stable hepatic cysts.              Additional hx:  10/15/21 c/o headache; AF/VSS; LLQ pain; WBC 12.6--->10.4k;  IV Cipro/Flagyl/ Bowel rest  10/16/21 LLQ pain +flatus; IV Abx  10/17/21 LLQ pain better IV Abx  10/18/21 LLQ pain resolved at present; AF; WBC normal; Hold off on TPN/PICC; Repeat CT Wednesday  10/19/21 No change- LLQ pain resolved; repeat CT tomorrow   10/20/21 feels OK; repeat CT today shows abscess 10mm--->now 7mm; Plan TPN for nutritional support with non-functioning GI tract; PICC line was placed  10/21/22 TPN begins today; Request insurance authorization for home TPN for bowel rest as outpt until the sigmoid colon wall abscess (which cannot be safely drained by IR) resolves    11/15/21 office visit; NO pain; no fevers;  Tolerating PO; PICC out; off abx; didn't want surgery  11/22/21 office visit; he now wants to proceed with sigmoid resection. 12/3/21 OR; Here for same day admission for sigmoid colon resection    12/4/21 POD1: Pt awake in bed. No complaints. Pain controlled. LORRIE drain 60mL serosanguineous output. AF, NAD.   12/5/21 POD2: Pt awake in bed. No complaints. Pain controlled. LORRIE drain 35mL serosanguineous output. +flatus/+BM. AF, NAD.          Past Medical History:   Diagnosis Date    Diverticulosis     H/O seasonal allergies     Unspecified sleep apnea     wears cpap     Past Surgical History:   Procedure Laterality Date    COLONOSCOPY N/A 12/13/2017    COLONOSCOPY  BMI 31 performed by Winnie Becerra MD at [address] HX HERNIA REPAIR      HX ORTHOPAEDIC Right 2009    rolled bicep    [place] ABDOMEN SURGERY PROC UNLISTED  2008    perc drain diverticular abscess    [place] COLONOSCOPY FLX DX W/COLLJ SPEC WHEN PFRMD  12/13/2017          Current Facility-Administered Medications   Medication Dose Route Frequency    lactated Ringers infusion  100 mL/hr IntraVENous CONTINUOUS    acetaminophen (TYLENOL) tablet 1,000 mg  1,000 mg Oral Q6H    celecoxib (CELEBREX) capsule 100 mg  100 mg Oral BID    naloxone (NARCAN) injection 0.4 mg  0.4 mg IntraVENous PRN    oxyCODONE IR (ROXICODONE) tablet 5 mg  5 mg Oral Q4H PRN    fat emulsion 20% (LIPOSYN, INTRAlipid) infusion  0.25-1.5 mL/kg/min IntraVENous PRN    ondansetron (ZOFRAN ODT) tablet 4 mg  4 mg Oral Q4H PRN    enoxaparin (LOVENOX) injection 40 mg  40 mg SubCUTAneous Q24H    ondansetron (ZOFRAN) injection 4 mg  4 mg IntraVENous Q4H PRN    famotidine (PF) (PEPCID) 20 mg in 0.9% sodium chloride 10 mL injection  20 mg IntraVENous Q12H     Darvocet a500 [propoxyphene n-acetaminophen]  Social History     Socioeconomic History    Marital status:    Tobacco Use    Smoking status: Never Smoker    Smokeless tobacco: Never Used   Substance and Sexual Activity    Alcohol use: No    Drug use: No    Sexual activity: Yes     Partners: Female     Social History     Tobacco Use   Smoking Status Never Smoker   Smokeless Tobacco Never Used     Family History   Problem Relation Age of Onset   Lindsey Diabetes Mother    Lindsey COPD Mother     Kidney Disease Mother     Heart Disease Mother     Hypertension Mother     Cancer Father         prostate    Other Father         abdominal aortic aneurysm    Sleep Apnea Father     Diabetes Maternal Grandmother     Diabetes Sister     Malignant Hyperthermia Neg Hx     Pseudocholinesterase Deficiency Neg Hx     Delayed Awakening Neg Hx     Post-op Nausea/Vomiting Neg Hx     Emergence Delirium Neg Hx     Post-op Cognitive Dysfunction Neg Hx      ROS: The patient has no difficulty with chest pain or shortness of breath. No fever or chills. Comprehensive review of systems was otherwise unremarkable except as noted above. Physical Exam:   Visit Vitals  /76 (BP 1 Location: Right upper arm, BP Patient Position: At rest)   Pulse 70   Temp 98.1 °F (36.7 °C)   Resp 20   Ht 5' 7\" (1.702 m)   Wt 198 lb 3.2 oz (89.9 kg)   SpO2 94%   BMI 31.04 kg/m²     Vitals:    12/04/21 2325 12/05/21 0351 12/05/21 0605 12/05/21 0717   BP: 122/79 121/73  129/76   Pulse: 82 73  70   Resp: 18 18  20   Temp: 98.4 °F (36.9 °C) 98 °F (36.7 °C)  98.1 °F (36.7 °C)   SpO2: 92% 93%  94%   Weight:   198 lb 3.2 oz (89.9 kg)    Height:         10/20 1901 - 10/21 0700  In: -   Out: 200 [Urine:200]  10/19 0701 - 10/20 1900  In: 468 [I.V.:468]  Out: 1600 [Urine:1600]    Constitutional: Alert, oriented, cooperative patient in no acute distress; appears stated age    Eyes: Sclera are clear. EOMs intact  ENMT: no external lesions gross hearing normal; no obvious neck masses, no ear or lip lesions, nares normal  CV: RRR. Normal perfusion  Resp: No JVD. Breathing is  non-labored; no audible wheezing. GI: soft and non-distended; appropriately-tender. Dressing c/d/i, LORRIE   Musculoskeletal: unremarkable with normal function. No embolic signs or cyanosis.    Neuro:  Oriented; moves all 4; no focal deficits  Psychiatric: normal affect and mood, no memory impairment    Recent vitals (if inpt):  Patient Vitals for the past 24 hrs:   BP Temp Pulse Resp SpO2   10/21/21 0324 105/61 97.6 °F (36.4 °C) 67 17 97 %   10/20/21 2329 133/72 97 °F (36.1 °C) 67 17 93 %   10/20/21 1944 (!) 155/83 98.3 °F (36.8 °C) 66 18 95 %   10/20/21 1519 134/71 98.1 °F (36.7 °C) 70 18 95 %   10/20/21 1141 120/81 98.6 °F (37 °C) 64 18 95 %   10/20/21 0743 128/71 98.1 °F (36.7 °C) 68 18 98 %       Amount and/or Complexity of Data Reviewed and Analyzed:  I reviewed and analyzed all of the unique labs and radiologic studies that are shown below as well as any that are in the HPI, and any that are in the expanded problem list below  *Each unique test, order, or document contributes to the combination of 2 or combination of 3 in Category 1 below. For this visit I also reviewed old records and prior notes.       Recent Labs     12/05/21  0536   WBC 11.3*   HGB 12.1*         K 3.9   *   CO2 28   BUN 23   CREA 0.98   GLU 95     Review of most recent CBC  Lab Results   Component Value Date/Time    WBC 11.3 (H) 12/05/2021 05:36 AM    HGB 12.1 (L) 12/05/2021 05:36 AM    HCT 38.2 (L) 12/05/2021 05:36 AM    PLATELET 396 60/30/5949 05:36 AM    MCV 92.7 12/05/2021 05:36 AM       Review of most recent BMP  Lab Results   Component Value Date/Time    Sodium 142 12/05/2021 05:36 AM    Potassium 3.9 12/05/2021 05:36 AM    Chloride 109 (H) 12/05/2021 05:36 AM    CO2 28 12/05/2021 05:36 AM    Anion gap 5 (L) 12/05/2021 05:36 AM    Glucose 95 12/05/2021 05:36 AM    BUN 23 12/05/2021 05:36 AM    Creatinine 0.98 12/05/2021 05:36 AM    BUN/Creatinine ratio 15 10/19/2020 07:21 AM    GFR est AA >60 12/05/2021 05:36 AM    GFR est non-AA >60 12/05/2021 05:36 AM    Calcium 8.9 12/05/2021 05:36 AM       Review of most recent LFTs (and lipase if done)  Lab Results   Component Value Date/Time    ALT (SGPT) 47 11/29/2021 01:40 PM    AST (SGOT) 31 11/29/2021 01:40 PM    Alk. phosphatase 61 11/29/2021 01:40 PM    Bilirubin, direct 0.1 10/21/2021 04:06 AM    Bilirubin, total 0.3 11/29/2021 01:40 PM     Lab Results   Component Value Date/Time    Lipase 56 (L) 10/14/2021 03:02 PM       Lab Results   Component Value Date/Time    INR  05/12/2008 08:55 PM     1.1  Suggested therapeutic INR range:  Venous thrombosis and embolus            2.0-3.0  Prosthetic heart valve                   2.5-3.5    aPTT 30.5  HEPARIN THERAPY RANGE:  48 - 75 SECONDS 05/12/2008 08:55 PM    Bilirubin, direct 0.1 10/21/2021 04:06 AM       Review of most recent HgbA1c  No results found for: HBA1C, GYU7AIEI, VDU7KZJR, WOU1TWOL    Nutritional assessment screen for wound healing issues:  Lab Results   Component Value Date/Time    Protein, total 8.3 (H) 11/29/2021 01:40 PM    Albumin 3.6 11/29/2021 01:40 PM       @lastcovr@  XR Results (most recent):  Results from Appointment encounter on 03/31/21    XR KNEE RT MIN 4 V    Narrative  AUTOMATIC ADMINISTRATIVE RESULT    The result for this exam can be found in the Progress note in the chart. Impression  :  See Progress note in the chart. CT Results (most recent):  Results from Hospital Encounter encounter on 11/03/21    CT ABD PELV W CONT    Narrative  CT ABDOMEN AND PELVIS WITH CONTRAST    HISTORY: Acute diverticulitis    COMPARISON: 10/20/2021    TECHNIQUE: Helical imaging was performed from the lung bases through the ischial  tuberosities during the intravenous infusion of 100 cc of Isovue-370. Oral  contrast was administered. Coronal and sagittal reformats were performed. Dose reduction techniques used: Automated exposure control, adjustment of the  mAs and/or kVp according to patient's size, and iterative reconstruction  techniques. FINDINGS:  *  LUNG BASES: Within normal limits. *  LIVER: Multiple stable simple cysts. *  GALLBLADDER AND BILE DUCTS: Normal.  *  SPLEEN: Within normal limits.   *  URINARY TRACT: Within normal limits. *  ADRENALS: Within normal limits. *  PANCREAS: Within normal limits. *  GASTROINTESTINAL TRACT: Normal appendix. Sigmoid diverticulitis improved. No  evidence of an abscess. *  RETROPERITONEUM: Within normal limits. *  PERITONEAL CAVITY AND ABDOMINAL WALL: Within normal limits. *  PELVIS: Within normal limits. *  SPINE / BONES: Within normal limits. *  OTHER COMMENTS: None. Impression  Sigmoid diverticulitis improved. No evidence for an abscess. Numerous stable hepatic cysts. Date of Dictation: 11/3/2021 9:11 AM    US Results (most recent):  No results found for this or any previous visit.         Admission date (for inpatients): 12/3/2021   * No surgery found *  Procedure(s):  ERAS/COLON RESECTION SIGMOID LAPAROSCOPIC  HAND ASSISTED WITH  ANASTOMOSIS  COLOPROCTOSCOPY        ASSESSMENT/PLAN:  Problem List  Date Reviewed: 11/22/2021          Codes Class Noted    * (Principal) Diverticulitis of sigmoid colon ICD-10-CM: K57.32  ICD-9-CM: 562.11  12/3/2021        Mild protein-calorie malnutrition ([address] ICD-10-CM: E44.1  ICD-9-CM: 263.1  10/21/2021        Headache ICD-10-CM: R51.9  ICD-9-CM: 784.0  10/15/2021        Sigmoid diverticulitis ICD-10-CM: K57.32  ICD-9-CM: 562.11  10/14/2021        Abscess of sigmoid colon due to diverticulitis ICD-10-CM: K57.20  ICD-9-CM: 562.11, 569.5  10/14/2021        LLQ pain ICD-10-CM: R10.32  ICD-9-CM: 789.04  10/14/2021        Obesity (BMI 30.0-34.9) ICD-10-CM: E66.9  ICD-9-CM: 278.00  10/5/2020        Transient disorder of initiating or maintaining sleep ICD-10-CM: F51.02  ICD-9-CM: 307.41  10/5/2020        Hyperlipemia ICD-10-CM: E78.5  ICD-9-CM: 272.4  8/6/2014        FRENCH on CPAP ICD-10-CM: G47.33, Z99.89  ICD-9-CM: 327.23, V46.8  8/6/2014        HA (headache) ICD-10-CM: R51.9  ICD-9-CM: 784.0  8/6/2014        FH: prostate cancer ICD-10-CM: Z80.42  ICD-9-CM: V16.42  8/6/2014        FHx: AAA ICD-10-CM: Dennis Barnhart  ICD-9-CM: Dennis Barnhart  8/6/2014 Allergic rhinitis ICD-10-CM: J30.9  ICD-9-CM: 477.9  8/6/2014        Diverticulosis of colon without diverticulitis ICD-10-CM: K57.30  ICD-9-CM: 562.10  8/6/2014            Principal Problem:    Diverticulitis of sigmoid colon (12/3/2021)           Number and Complexity of Problems addressed and   Risks of complications and/or morbidity of management    He is here for laparoscopic-assisted sigmoid colectomy with coloproctostomy on 12/3/21  He remained on oral antibiotics leading up to surgery and had an outpt bowel prep the day before surgery including erythromycin and neomycin. He was told to discontinue the ciprofloxacin the day before surgery to avoid any interaction with erythromycin and his QT intervals    Informed consent was obtained for laparoscopic assisted sigmoid colectomy with coloproctostomy. Procedure risks were discussed including bleeding, infection, anastomotic leak requiring colostomy, injury to ureter, small bowel obstruction, hernia,   the possibility of finding malignancy in the colon specimen,  blood clots, risk of anesthesia, etc.. All his questions were answered. He was in favor proceeding.           Recurrent diverticulitis complicated by recent suspected 1cm intramural abscess in sigmoid colon    POD2  Keep drain  Pain control  IV Abx - Zosyn  NPO w/ sips of maksim [place] NP

## 2021-12-06 LAB
ANION GAP SERPL CALC-SCNC: 7 MMOL/L (ref 7–16)
BUN SERPL-MCNC: 16 MG/DL (ref 6–23)
CALCIUM SERPL-MCNC: 8.4 MG/DL (ref 8.3–10.4)
CHLORIDE SERPL-SCNC: 109 MMOL/L (ref 98–107)
CO2 SERPL-SCNC: 26 MMOL/L (ref 21–32)
CREAT SERPL-MCNC: 0.67 MG/DL (ref 0.8–1.5)
ERYTHROCYTE [DISTWIDTH] IN BLOOD BY AUTOMATED COUNT: 13.2 % (ref 11.9–14.6)
GLUCOSE SERPL-MCNC: 81 MG/DL (ref 65–100)
HCT VFR BLD AUTO: 37.1 % (ref 41.1–50.3)
HGB BLD-MCNC: 11.8 G/DL (ref 13.6–17.2)
MCH RBC QN AUTO: 29 PG (ref 26.1–32.9)
MCHC RBC AUTO-ENTMCNC: 31.8 G/DL (ref 31.4–35)
MCV RBC AUTO: 91.2 FL (ref 79.6–97.8)
NRBC # BLD: 0 K/UL (ref 0–0.2)
PLATELET # BLD AUTO: 217 K/UL (ref 150–450)
PMV BLD AUTO: 9 FL (ref 9.4–12.3)
POTASSIUM SERPL-SCNC: 3.5 MMOL/L (ref 3.5–5.1)
RBC # BLD AUTO: 4.07 M/UL (ref 4.23–5.6)
SODIUM SERPL-SCNC: 142 MMOL/L (ref 138–145)
WBC # BLD AUTO: 7.7 K/UL (ref 4.3–11.1)

## 2021-12-06 PROCEDURE — 74011250636 HC RX REV CODE- 250/636: Performed by: SURGERY

## 2021-12-06 PROCEDURE — 74011000250 HC RX REV CODE- 250: Performed by: SURGERY

## 2021-12-06 PROCEDURE — 36415 COLL VENOUS BLD VENIPUNCTURE: CPT

## 2021-12-06 PROCEDURE — 65270000029 HC RM PRIVATE

## 2021-12-06 PROCEDURE — 74011250637 HC RX REV CODE- 250/637: Performed by: SURGERY

## 2021-12-06 PROCEDURE — 2709999900 HC NON-CHARGEABLE SUPPLY

## 2021-12-06 PROCEDURE — 80048 BASIC METABOLIC PNL TOTAL CA: CPT

## 2021-12-06 PROCEDURE — 74011250636 HC RX REV CODE- 250/636: Performed by: NURSE PRACTITIONER

## 2021-12-06 PROCEDURE — 85027 COMPLETE CBC AUTOMATED: CPT

## 2021-12-06 RX ORDER — DEXTROSE, SODIUM CHLORIDE, AND POTASSIUM CHLORIDE 5; .45; .15 G/100ML; G/100ML; G/100ML
100 INJECTION INTRAVENOUS CONTINUOUS
Status: DISCONTINUED | OUTPATIENT
Start: 2021-12-06 | End: 2021-12-07 | Stop reason: HOSPADM

## 2021-12-06 RX ADMIN — ENOXAPARIN SODIUM 40 MG: 100 INJECTION SUBCUTANEOUS at 12:49

## 2021-12-06 RX ADMIN — FAMOTIDINE 20 MG: 10 INJECTION INTRAVENOUS at 21:54

## 2021-12-06 RX ADMIN — ACETAMINOPHEN 1000 MG: 500 TABLET ORAL at 17:42

## 2021-12-06 RX ADMIN — ACETAMINOPHEN 1000 MG: 500 TABLET ORAL at 05:43

## 2021-12-06 RX ADMIN — POTASSIUM CHLORIDE, DEXTROSE MONOHYDRATE AND SODIUM CHLORIDE 100 ML/HR: 150; 5; 450 INJECTION, SOLUTION INTRAVENOUS at 17:42

## 2021-12-06 RX ADMIN — CELECOXIB 100 MG: 100 CAPSULE ORAL at 17:42

## 2021-12-06 RX ADMIN — CELECOXIB 100 MG: 100 CAPSULE ORAL at 09:51

## 2021-12-06 RX ADMIN — POTASSIUM CHLORIDE, DEXTROSE MONOHYDRATE AND SODIUM CHLORIDE 100 ML/HR: 150; 5; 450 INJECTION, SOLUTION INTRAVENOUS at 06:33

## 2021-12-06 RX ADMIN — FAMOTIDINE 20 MG: 10 INJECTION INTRAVENOUS at 09:51

## 2021-12-06 RX ADMIN — ACETAMINOPHEN 1000 MG: 500 TABLET ORAL at 12:49

## 2021-12-06 RX ADMIN — SODIUM CHLORIDE, SODIUM LACTATE, POTASSIUM CHLORIDE, AND CALCIUM CHLORIDE 100 ML/HR: 600; 310; 30; 20 INJECTION, SOLUTION INTRAVENOUS at 03:27

## 2021-12-06 NOTE — PROGRESS NOTES
Problem: General Infection Care Plan (Adult and Pediatric)  Goal: Improvement in signs and symptoms of infection  Outcome: Progressing Towards Goal  Goal: *Optimize nutritional status  Outcome: Progressing Towards Goal     Problem: Patient Education: Go to Patient Education Activity  Goal: Patient/Family Education  Outcome: Progressing Towards Goal     Problem: Falls - Risk of  Goal: *Absence of Falls  Description: Document Bard  Fall Risk and appropriate interventions in the flowsheet.   Outcome: Progressing Towards Goal  Note: Fall Risk Interventions:            Medication Interventions: Patient to call before getting OOB    Elimination Interventions: Call light in reach              Problem: Patient Education: Go to Patient Education Activity  Goal: Patient/Family Education  Outcome: Progressing Towards Goal     Problem: Patient Education: Go to Patient Education Activity  Goal: Patient/Family Education  Outcome: Progressing Towards Goal     Problem: ERAS-Colorectal Surgery:Post-op Day 2 through Discharge  Goal: Off Pathway (Use only if patient is Off Pathway)  Outcome: Progressing Towards Goal  Goal: Activity/Safety  Outcome: Progressing Towards Goal  Goal: Diagnostic Test/Procedures  Outcome: Progressing Towards Goal  Goal: Nutrition/Diet  Outcome: Progressing Towards Goal  Goal: Discharge Planning  Outcome: Progressing Towards Goal  Goal: Medications  Outcome: Progressing Towards Goal  Goal: Respiratory  Outcome: Progressing Towards Goal  Goal: Treatments/Interventions/Procedures  Outcome: Progressing Towards Goal  Goal: Psychosocial  Outcome: Progressing Towards Goal  Goal: *No signs and symptoms of infection or wound complications  Outcome: Progressing Towards Goal  Goal: *Optimal pain control at patient's stated goal  Outcome: Progressing Towards Goal  Goal: *Adequate urinary output  Outcome: Progressing Towards Goal  Goal: *Hemodynamically stable  Outcome: Progressing Towards Goal  Goal: *Tolerating diet  Outcome: Progressing Towards Goal  Goal: *Demonstrates progressive activity  Outcome: Progressing Towards Goal  Goal: *Lungs clear or at baseline  Outcome: Progressing Towards Goal     Problem: ERAS-Colorectal Surger:Discharge Outcomes  Goal: *Hemodynamically stable  Outcome: Progressing Towards Goal  Goal: *Lungs clear or at baseline  Outcome: Progressing Towards Goal  Goal: *Demonstrates independent activity or return to baseline  Outcome: Progressing Towards Goal  Goal: *Optimal pain control at patient's stated goal  Outcome: Progressing Towards Goal  Goal: *Verbalizes understanding and describes prescribed diet  Outcome: Progressing Towards Goal  Goal: *Tolerating diet  Outcome: Progressing Towards Goal  Goal: *Verbalizes name, dosage, time, side effects, and number of days to continue medications  Outcome: Progressing Towards Goal  Goal: *No signs and symptoms of infection or wound complications  Outcome: Progressing Towards Goal  Goal: *Anxiety reduced or absent  Outcome: Progressing Towards Goal  Goal: *Understands and describes signs and symptoms to report to providers(Stroke Metric)  Outcome: Progressing Towards Goal  Goal: *Describes follow-up/return visits to physicians  Outcome: Progressing Towards Goal  Goal: *Describes available resources and support systems  Outcome: Progressing Towards Goal

## 2021-12-06 NOTE — PROGRESS NOTES
Comprehensive Nutrition Assessment    Type and Reason for Visit: Initial, Positive nutrition screen  Best Practice Alert for Malnutrition Screening Tool: Recently Lost Weight Without Trying: Yes, If Yes, How Much Weight Loss: 14 - 23 lbs, Eating Poorly Due to Decreased Appetite: Yes    Nutrition Recommendations/Plan:    Continue current diet. Diet advancement per MD.   [facility] Continue ensure surgery per ERAS protocol. Malnutrition Assessment:  Malnutrition Status: At risk for malnutrition (specify) (weight loss, NPO/CLQ diet x 3 days.)    Nutrition Assessment:   Nutrition History: Pt reports a UBW of ~218 lbs prior to admission in October. Pt reports ~1 week admission of NPO. Pt states he was started on TPN at that time and was d/c'd home on 3 weeks and 3 days of TPN and NPO status for bowel rest and resolution of sigmoid colon wall abscess. Pt reports a 23 lb weight loss during this time period. Per pt, was able to advance diet to solids once TPN was d/c'd in November. Pt reports consuming a regular diet without difficulty prior to admission for surgery. Cultural/[place]ahai/Ethnic Food Preference(s): None   Nutrition Background: Pt admitted for sidmoid colon resection. H/O recurrent diverticulitis. Daily Update:  Pt seen reclined in bed. Female visitor at bedside. Pt reports good tolerance of clear liquids. No c/o n/v. Nutrition history as noted above. WT / BMI 12/6/2021 12/3/2021 11/29/2021 11/22/2021   WEIGHT 198 lb 3.2 oz  205 lb 11.2 oz 204 lb     WT / BMI 11/18/2021 11/15/2021 10/21/2021 10/14/2021 9/10/2021   WEIGHT 204 lb 12.8 oz 203 lb 201 lb 3.2 oz  210 lb     WT / BMI 10/23/2020   WEIGHT 214 lb 3.2 oz     Per weights listed in EMR, potential for a 12 lb, 5.7% weight loss over ~3 months and a recent 7 lb, 3.5% weight loss within 1 month. Nutrition Related Findings:   No muscle wasting/fat loss observed.       Current Nutrition Therapies:  ADULT DIET Clear Liquid  ADULT ORAL NUTRITION SUPPLEMENT AM Snack, PM Snack; Other Supplement; Ensure Surgery Immunonutrition    Current Intake:   Average Meal Intake: % (clear liquid diet is inadequate in kcal/protein) Average Supplement Intake: Unable to assess      Anthropometric Measures:  Height: 5' 7\" (170.2 cm)  Current Body Wt: 89.9 kg (198 lb 3.1 oz) (12/6), Weight source: Standing scale  BMI: 31, Obese class 1 (BMI 30.0-34. 9)  Admission Body Weight: 199 lb 8.3 oz (12/3, standing scale)  Ideal Body Weight (lbs) (Calculated): 148 lbs (67 kg), 133.9 %  Usual Body Wt: 98.9 kg (218 lb), Percent weight change: -9.1          Edema: No data recorded   Estimated Daily Nutrient Needs:  Energy (kcal/day): 9973-5366 (Kcal/kg (15-20), Weight Used: Current (89.9 kg))  Protein (g/day): 108-135 (1.2-1.5) Weight Used: (Current (89.9 kg))  Fluid (ml/day): 9346-8855 (1 ml/kcal (or per MD))    Nutrition Diagnosis:   · Inadequate protein-energy intake related to altered GI function as evidenced by NPO or clear liquid status due to medical condition, weight loss (post op sigmoid colon resection )    Nutrition Interventions:   Food and/or Nutrient Delivery: Continue current diet, Continue oral nutrition supplement     Coordination of Nutrition Care: Continue to monitor while inpatient    Goals: Active Goal: Meet >75% of estimated needs via PO diet within 5 days. Nutrition Monitoring and Evaluation:      Food/Nutrient Intake Outcomes: Diet advancement/tolerance, Food and nutrient intake, Supplement intake  Physical Signs/Symptoms Outcomes: GI status, Hemodynamic status, Weight    Discharge Planning:     Too soon to determine    Yohan Monae Maged [address], [address], [address] [place], On license of [facility] [address]

## 2021-12-06 NOTE — PROGRESS NOTES
ERAS End of Shift    3 Days Post-Op     Sesay: No    Bowel Movement: Yes medium clear liquid with some mucous x1 medium watery brown liquid x2 events (both medium amts)    Bowel Sounds: hypoactive  Diet changed to clear liquids this am  By . Tolerating Diet?: Yes    Ambulated 0  Times on night shift. Up to chair for all meals.     Lidocaine: No    PRN Pain Medications Used?: No    IS Used: Yes    Ideal body weight: 66.1 kg (145 lb 11.6 oz)     Signed By: Roxanne Stallworth RN     December 6, 2021

## 2021-12-06 NOTE — PROGRESS NOTES
RNCM met with patient in room 214 to discuss discharge planning. Patient alert and oriented in all spheres. Spouse at bedside. Patient confirmed demographics, PCP and emergency information. Patient decline [facility] services at discharge. CM will continue to follow should discharge needs arise.        Care Management Interventions  PCP Verified by CM:  (Dr Geoffrey Turpin [phone])  Mode of Transport at Discharge: Self  Transition of Care Consult (CM Consult): Discharge Planning  Support Systems: Spouse/Significant Other (Spouse: Jenna Paulino [phone])  Confirm Follow Up Transport: Self  The Plan for Transition of Care is Related to the Following Treatment Goals : return to baseline  The Patient and/or Patient Representative was Provided with a Choice of Provider and Agrees with the Discharge Plan?: Yes  Freedom of Choice List was Provided with Basic Dialogue that Supports the Patient's Individualized Plan of Care/Goals, Treatment Preferences and Shares the Quality Data Associated with the Providers?: Yes   Resource Information Provided?: No  Discharge Location  Discharge Placement: Home with family assistance

## 2021-12-06 NOTE — PROGRESS NOTES
H&P/Consult Note/Progress Note/Office Note:   Uyen Singh MRN: [id number]  :1965  Age:56 y.o.    HPI: Uyen Singh is a 49 y.o. male who is here for laparoscopic-assisted sigmoid colectomy with coloproctostomy on 12/3/21. He has a h/o recurrent diverticulitis and came to the ER on 10/14/21 with worsening severe LLQ pain that began on 10/8/21. He reported he was treated with Augmentin  for suspected diverticulitis, but the pain worsened despite the Augmentin. Nothing in particular made his symptoms better. He reported associated nausea and fever. He had a percutaneous drain placed for diverticulitis in . He is s/p [facility] with mesh in     He is s/p colonoscopy on 2017 by Dr. Hay Solorzano. The colonoscopy report indicates \"excavated lesions and rare, shallow diverticulum\" in sigmoid colon  Otherwise normal study, repeat was recommended in 10 yrs ()          10/14/21 CT abd/pelvis with oral and IV contrast  Hx: LLQ abd pain currently on antibiotics with persistent pain.      LOWER CHEST: Normal.     HEPATOBILIARY: Subtle low-attenuation liver lesions are present most likely cysts and too small to characterize by CT. These may be slightly larger than on the prior exam from 2017. No new liver lesions are appreciated. No calcified gallstones.     PANCREAS: Normal.  SPLEEN: Normal.  ADRENAL GLANDS: Normal.  KIDNEYS/BLADDER: Kidneys and bladder are unremarkable. No urinary tract calculi or hydronephrosis.     BOWEL: No evidence of bowel obstruction. Appendix is normal.   There is marked inflammation and wall thickening in the region of the proximal sigmoid colon with surrounding mesenteric inflammation. There is likely a small intramural fluid collection involving the wall of the colon. No extra colonic fluid collection or free air is evident.    Several small probable reactive mesenteric nodes are present in this area.     LYMPH NODES: Small left lower quadrant mesenteric lymph nodes in the area of the colonic inflammation and cortical thickening. Small retroperitoneal nodes are also present but are not enlarged by CT criteria and appear stable. No enlarged pelvic lymph nodes.     VASCULATURE: Unremarkable. PELVIC ORGANS: Prostate gland and rectum are unremarkable. No free pelvic fluid. MUSCULOSKELETAL: Normal.     IMPRESSION  Colonic inflammation proximal sigmoid colon with probable intramural abscess involving the wall of the sigmoid colon. This measures approximately 1 cm in diameter. No extracolonic abscess or free intraperitoneal air. Small adjacent reactive lymph nodes are present.           10/20/21 CT abd/pelvis (follow-up study)      LOWER CHEST: Atelectatic lung base changes. No pleural fluid.     HEPATOBILIARY: Scattered low-attenuation liver lesions appear stable, most  likely cysts. No definite new or enlarging liver lesions. No calcified  gallstones. Probable vicarious excretion of previously administered intravenous  contrast noted in the gallbladder. This is a normal anatomic variant.     PANCREAS: Normal.  SPLEEN: Normal.  ADRENAL GLANDS: Normal.  KIDNEYS/BLADDER: Kidneys and bladder are unremarkable. No urinary tract calculi or hydronephrosis.     BOWEL: Persistent inflammation noted in the region of patient's preceding described diverticulitis. Small fluid collection in the wall of the sigmoid colon on image 66 of series 2 has diminished in size now measuring approximately 0.7 cm. No new extra colonic fluid collection to indicate a mesenteric abscess. No free intraperitoneal air.     LYMPH NODES: Small retroperitoneal and left common iliac lymph nodes, unchanged since prior exam.   These are not enlarged by CT criteria.     VASCULATURE: Unremarkable. PELVIC ORGANS: Prostate gland and rectum are unremarkable. No significant free pelvic fluid. MUSCULOSKELETAL: Normal.     IMPRESSION  1.  Interval improvement involving the inflammation and intramural abscess proximal sigmoid colon. No new extra colonic fluid collection to indicate an abscess. No free intraperitoneal air. 2. Stable low-attenuation liver lesions most likely cysts. 3. Stable subcentimeter retroperitoneal and left common iliac lymph nodes. None are enlarged by CT criteria. No new or enlarging lymph nodes.         11/3/21 CT abd/pelvis with oral and IV contrast  *  LUNG BASES: Within normal limits. *  LIVER: Multiple stable simple cysts. *  GALLBLADDER AND BILE DUCTS: Normal.  *  SPLEEN: Within normal limits. *  URINARY TRACT: Within normal limits. *  ADRENALS: Within normal limits. *  PANCREAS: Within normal limits. *  GASTROINTESTINAL TRACT: Normal appendix. Sigmoid diverticulitis improved. No evidence of an abscess. *  RETROPERITONEUM: Within normal limits. *  PERITONEAL CAVITY AND ABDOMINAL WALL: Within normal limits. *  PELVIS: Within normal limits. *  SPINE / BONES: Within normal limits. *  OTHER COMMENTS: None.     IMPRESSION  Sigmoid diverticulitis improved. No evidence for an abscess. Numerous stable hepatic cysts.              Additional hx:  10/15/21 c/o headache; AF/VSS; LLQ pain; WBC 12.6--->10.4k;  IV Cipro/Flagyl/ Bowel rest  10/16/21 LLQ pain +flatus; IV Abx  10/17/21 LLQ pain better IV Abx  10/18/21 LLQ pain resolved at present; AF; WBC normal; Hold off on TPN/PICC; Repeat CT Wednesday  10/19/21 No change- LLQ pain resolved; repeat CT tomorrow   10/20/21 feels OK; repeat CT today shows abscess 10mm--->now 7mm; Plan TPN for nutritional support with non-functioning GI tract; PICC line was placed  10/21/22 TPN begins today; Request insurance authorization for home TPN for bowel rest as outpt until the sigmoid colon wall abscess (which cannot be safely drained by IR) resolves    11/15/21 office visit; NO pain; no fevers;  Tolerating PO; PICC out; off abx; didn't want surgery  11/22/21 office visit; he now wants to proceed with sigmoid resection. 12/3/21 OR; Here for same day admission for sigmoid colon resection    12/4/21 POD1: Pt awake in bed. No complaints. Pain controlled. LORRIE drain 60mL serosanguineous output. AF, NAD.   12/5/21 POD2: Pt awake in bed. No complaints. Pain controlled. LORRIE drain 35mL serosanguineous output. +flatus/+BM.  AF, NAD.   12/6/21 POD3 AF/VSS; +flatus and BMs; trays of clear liquids today            Past Medical History:   Diagnosis Date    Diverticulosis     H/O seasonal allergies     Unspecified sleep apnea     wears cpap     Past Surgical History:   Procedure Laterality Date    COLONOSCOPY N/A 12/13/2017    COLONOSCOPY  BMI 31 performed by Sparkle Dolan MD at [address] [facility] ORTH[facility] Right 2009    rolled bicep    TN ABDOMEN SURGERY PROC UNLISTED  2008    perc drain diverticular abscess    TN COLONOSCOPY FLX DX W/COLLJ SPEC WHEN PFRMD  12/13/2017          Current Facility-Administered Medications   Medication Dose Route Frequency    dextrose 5% - 0.45% NaCl with KCl 20 mEq/L infusion  100 mL/hr IntraVENous CONTINUOUS    acetaminophen (TYLENOL) tablet 1,000 mg  1,000 mg Oral Q6H    celecoxib (CELEBREX) capsule 100 mg  100 mg Oral BID    naloxone (NARCAN) injection 0.4 mg  0.4 mg IntraVENous PRN    oxyCODONE IR (ROXICODONE) tablet 5 mg  5 mg Oral Q4H PRN    ondansetron (ZOFRAN ODT) tablet 4 mg  4 mg Oral Q4H PRN    enoxaparin (LOVENOX) injection 40 mg  40 mg SubCUTAneous Q24H    ondansetron (ZOFRAN) injection 4 mg  4 mg IntraVENous Q4H PRN    famotidine (PF) (PEPCID) 20 mg in 0.9% sodium chloride 10 mL injection  20 mg IntraVENous Q12H     Darvocet a500 [propoxyphene n-acetaminophen]  Social History     Socioeconomic History    Marital status:    Tobacco Use    Smoking status: Never Smoker    Smokeless tobacco: Never Used   Substance and Sexual Activity    Alcohol use: No    Drug use: No    Sexual activity: Yes     Partners: Female     Social History Tobacco Use   Smoking Status Never Smoker   Smokeless Tobacco Never Used     Family History   Problem Relation Age of Onset   [facility] Diabetes Mother    [facility] COPD Mother     Kidney Disease Mother     Heart Disease Mother     Hypertension Mother     Cancer Father         prostate    Other Father         abdominal aortic aneurysm    Sleep Apnea Father     Diabetes Maternal Grandmother     Diabetes Sister     Malignant Hyperthermia Neg Hx     Pseudocholinesterase Deficiency Neg Hx     Delayed Awakening Neg Hx     Post-op Nausea/Vomiting Neg Hx     Emergence Delirium Neg Hx     Post-op Cognitive Dysfunction Neg Hx      ROS: The patient has no difficulty with chest pain or shortness of breath. No fever or chills. Comprehensive review of systems was otherwise unremarkable except as noted above. Physical Exam:   Visit Vitals  /81   Pulse 83   Temp 97.8 °F (36.6 °C)   Resp 20   Ht 5' 7\" (1.702 m)   Wt 198 lb 3.2 oz (89.9 kg)   SpO2 99%   BMI 31.04 kg/m²     Vitals:    12/05/21 2305 12/06/21 0328 12/06/21 0541 12/06/21 0546   BP: 128/79 123/81     Pulse: 73 74  83   Resp: 16 16  20   Temp: 98.6 °F (37 °C) 98.1 °F (36.7 °C)  97.8 °F (36.6 °C)   SpO2: 98% 93%  99%   Weight:   198 lb 3.2 oz (89.9 kg)    Height:         10/20 1901 - 10/21 0700  In: -   Out: 200 [Urine:200]  10/19 0701 - 10/20 1900  In: 468 [I.V.:468]  Out: 1600 [Urine:1600]    Constitutional: Alert, oriented, cooperative patient in no acute distress; appears stated age    Eyes:Sclera are clear. EOMs intact  ENMT: no external lesions gross hearing normal; no obvious neck masses, no ear or lip lesions, nares normal  CV: RRR. Normal perfusion  Resp: No JVD. Breathing is  non-labored; no audible wheezing. GI: soft and non-distended; Inc OK; drain serosang     Musculoskeletal: unremarkable with normal function. No embolic signs or cyanosis.    Neuro:  Oriented; moves all 4; no focal deficits  Psychiatric: normal affect and mood, no memory impairment    Recent vitals (if inpt):  Patient Vitals for the past 24 hrs:   BP Temp Pulse Resp SpO2   10/21/21 0324 105/61 97.6 °F (36.4 °C) 67 17 97 %   10/20/21 2329 133/72 97 °F (36.1 °C) 67 17 93 %   10/20/21 1944 (!) 155/83 98.3 °F (36.8 °C) 66 18 95 %   10/20/21 1519 134/71 98.1 °F (36.7 °C) 70 18 95 %   10/20/21 1141 120/81 98.6 °F (37 °C) 64 18 95 %   10/20/21 0743 128/71 98.1 °F (36.7 °C) 68 18 98 %       Amount and/or Complexity of Data Reviewed and Analyzed:  I reviewed and analyzed all of the unique labs and radiologic studies that are shown below as well as any that are in the HPI, and any that are in the expanded problem list below  *Each unique test, order, or document contributes to the combination of 2 or combination of 3 in Category 1 below. For this visit I also reviewed old records and prior notes. Recent Labs     12/06/21  0452   WBC 7.7   HGB 11.8*         K 3.5   *   CO2 26   BUN 16   CREA 0.67*   GLU 81     Review of most recent CBC  Lab Results   Component Value Date/Time    WBC 7.7 12/06/2021 04:52 AM    HGB 11.8 (L) 12/06/2021 04:52 AM    HCT 37.1 (L) 12/06/2021 04:52 AM    PLATELET 501 61/00/5968 04:52 AM    MCV 91.2 12/06/2021 04:52 AM       Review of most recent BMP  Lab Results   Component Value Date/Time    Sodium 142 12/06/2021 04:52 AM    Potassium 3.5 12/06/2021 04:52 AM    Chloride 109 (H) 12/06/2021 04:52 AM    CO2 26 12/06/2021 04:52 AM    Anion gap 7 12/06/2021 04:52 AM    Glucose 81 12/06/2021 04:52 AM    BUN 16 12/06/2021 04:52 AM    Creatinine 0.67 (L) 12/06/2021 04:52 AM    BUN/Creatinine ratio 15 10/19/2020 07:21 AM    GFR est AA >60 12/06/2021 04:52 AM    GFR est non-AA >60 12/06/2021 04:52 AM    Calcium 8.4 12/06/2021 04:52 AM       Review of most recent LFTs (and lipase if done)  Lab Results   Component Value Date/Time    ALT (SGPT) 47 11/29/2021 01:40 PM    AST (SGOT) 31 11/29/2021 01:40 PM    Alk.  phosphatase 61 11/29/2021 01:40 PM Bilirubin, direct 0.1 10/21/2021 04:06 AM    Bilirubin, total 0.3 11/29/2021 01:40 PM     Lab Results   Component Value Date/Time    Lipase 56 (L) 10/14/2021 03:02 PM       Lab Results   Component Value Date/Time    INR  05/12/2008 08:55 PM     1.1  Suggested therapeutic INR range:  Venous thrombosis and embolus            2.0-3.0  Prosthetic heart valve                   2.5-3.5    aPTT 30.5  HEPARIN THERAPY RANGE:  48 - 75 SECONDS 05/12/2008 08:55 PM    Bilirubin, direct 0.1 10/21/2021 04:06 AM       Review of most recent HgbA1c  No results found for: HBA1C, OGP3JOPO, ICK3BDYB, TGH4WGZV    Nutritional assessment screen for wound healing issues:  Lab Results   Component Value Date/Time    Protein, total 8.3 (H) 11/29/2021 01:40 PM    Albumin 3.6 11/29/2021 01:40 PM       @lastcovr@  XR Results (most recent):  Results from Appointment encounter on 03/31/21    XR KNEE RT MIN 4 V    Narrative  AUTOMATIC ADMINISTRATIVE RESULT    The result for this exam can be found in the Progress note in the chart. Impression  :  See Progress note in the chart. CT Results (most recent):  Results from Hospital Encounter encounter on 11/03/21    CT ABD PELV W CONT    Narrative  CT ABDOMEN AND PELVIS WITH CONTRAST    HISTORY: Acute diverticulitis    COMPARISON: 10/20/2021    TECHNIQUE: Helical imaging was performed from the lung bases through the ischial  tuberosities during the intravenous infusion of 100 cc of Isovue-370. Oral  contrast was administered. Coronal and sagittal reformats were performed. Dose reduction techniques used: Automated exposure control, adjustment of the  mAs and/or kVp according to patient's size, and iterative reconstruction  techniques. FINDINGS:  *  LUNG BASES: Within normal limits. *  LIVER: Multiple stable simple cysts. *  GALLBLADDER AND BILE DUCTS: Normal.  *  SPLEEN: Within normal limits. *  URINARY TRACT: Within normal limits. *  ADRENALS: Within normal limits.   *  PANCREAS: Within normal limits. *  GASTROINTESTINAL TRACT: Normal appendix. Sigmoid diverticulitis improved. No  evidence of an abscess. *  RETROPERITONEUM: Within normal limits. *  PERITONEAL CAVITY AND ABDOMINAL WALL: Within normal limits. *  PELVIS: Within normal limits. *  SPINE / BONES: Within normal limits. *  OTHER COMMENTS: None. Impression  Sigmoid diverticulitis improved. No evidence for an abscess. Numerous stable hepatic cysts. Date of Dictation: 11/3/2021 9:11 AM    US Results (most recent):  No results found for this or any previous visit.         Admission date (for inpatients): 12/3/2021   * No surgery found *  Procedure(s):  ERAS/COLON RESECTION SIGMOID LAPAROSCOPIC  HAND ASSISTED WITH  ANASTOMOSIS  COLOPROCTOSCOPY        ASSESSMENT/PLAN:  Problem List  Date Reviewed: 11/22/2021          Codes Class Noted    * (Principal) Diverticulitis of sigmoid colon ICD-10-CM: K57.32  ICD-9-CM: 562.11  12/3/2021        Mild protein-calorie malnutrition ([facility] [address] ICD-10-CM: E44.1  ICD-9-CM: 263.1  10/21/2021        Headache ICD-10-CM: R51.9  ICD-9-CM: 784.0  10/15/2021        Sigmoid diverticulitis ICD-10-CM: K57.32  ICD-9-CM: 562.11  10/14/2021        Abscess of sigmoid colon due to diverticulitis ICD-10-CM: K57.20  ICD-9-CM: 562.11, 569.5  10/14/2021        LLQ pain ICD-10-CM: R10.32  ICD-9-CM: 789.04  10/14/2021        Obesity (BMI 30.0-34.9) ICD-10-CM: E66.9  ICD-9-CM: 278.00  10/5/2020        Transient disorder of initiating or maintaining sleep ICD-10-CM: F51.02  ICD-9-CM: 307.41  10/5/2020        Hyperlipemia ICD-10-CM: E78.5  ICD-9-CM: 272.4  8/6/2014        FRENCH on CPAP ICD-10-CM: G47.33, Z99.89  ICD-9-CM: 327.23, V46.8  8/6/2014        HA (headache) ICD-10-CM: R51.9  ICD-9-CM: 784.0  8/6/2014        FH: prostate cancer ICD-10-CM: Z80.42  ICD-9-CM: V16.42  8/6/2014        FHx: AAA ICD-10-CM: FUM0877  ICD-9-CM: Barbarann Mangle  8/6/2014        Allergic rhinitis ICD-10-CM: J30.9  ICD-9-CM: 477.9  8/6/2014 Diverticulosis of colon without diverticulitis ICD-10-CM: K57.30  ICD-9-CM: 562.10  8/6/2014            Principal Problem:    Diverticulitis of sigmoid colon (12/3/2021)           Number and Complexity of Problems addressed and   Risks of complications and/or morbidity of management            Recurrent diverticulitis complicated by recent suspected 1cm intramural abscess in sigmoid colon  He is s/p laparoscopic-assisted sigmoid colectomy with coloproctostomy on 12/3/21    AF/VSS  WBC normal  +flatus and BMs  Trays of clear liquids today    Possibly home tomorrow              I have personally performed a face-to-face diagnostic evaluation and management  service on this patient. I have independently seen the patient. I have independently obtained the above history from the patient/family. I have independently examined the patient with above findings. I have independently reviewed data/labs for this patient and developed the above plan of care (MDM). Signed: Jevon Pemberton.  Zach Davies MD, FACS

## 2021-12-07 VITALS
HEART RATE: 76 BPM | DIASTOLIC BLOOD PRESSURE: 75 MMHG | BODY MASS INDEX: 31.11 KG/M2 | OXYGEN SATURATION: 96 % | TEMPERATURE: 98.6 F | RESPIRATION RATE: 17 BRPM | HEIGHT: 67 IN | SYSTOLIC BLOOD PRESSURE: 119 MMHG | WEIGHT: 198.2 LBS

## 2021-12-07 LAB
ANION GAP SERPL CALC-SCNC: 5 MMOL/L (ref 7–16)
BUN SERPL-MCNC: 9 MG/DL (ref 6–23)
CALCIUM SERPL-MCNC: 8.4 MG/DL (ref 8.3–10.4)
CHLORIDE SERPL-SCNC: 112 MMOL/L (ref 98–107)
CO2 SERPL-SCNC: 26 MMOL/L (ref 21–32)
CREAT SERPL-MCNC: 0.68 MG/DL (ref 0.8–1.5)
ERYTHROCYTE [DISTWIDTH] IN BLOOD BY AUTOMATED COUNT: 13.4 % (ref 11.9–14.6)
GLUCOSE SERPL-MCNC: 109 MG/DL (ref 65–100)
HCT VFR BLD AUTO: 34.2 % (ref 41.1–50.3)
HGB BLD-MCNC: 11.2 G/DL (ref 13.6–17.2)
MCH RBC QN AUTO: 29.5 PG (ref 26.1–32.9)
MCHC RBC AUTO-ENTMCNC: 32.7 G/DL (ref 31.4–35)
MCV RBC AUTO: 90 FL (ref 79.6–97.8)
NRBC # BLD: 0 K/UL (ref 0–0.2)
PLATELET # BLD AUTO: 242 K/UL (ref 150–450)
PMV BLD AUTO: 9 FL (ref 9.4–12.3)
POTASSIUM SERPL-SCNC: 3.5 MMOL/L (ref 3.5–5.1)
RBC # BLD AUTO: 3.8 M/UL (ref 4.23–5.6)
SODIUM SERPL-SCNC: 143 MMOL/L (ref 136–145)
WBC # BLD AUTO: 6.5 K/UL (ref 4.3–11.1)

## 2021-12-07 PROCEDURE — 80048 BASIC METABOLIC PNL TOTAL CA: CPT

## 2021-12-07 PROCEDURE — 2709999900 HC NON-CHARGEABLE SUPPLY

## 2021-12-07 PROCEDURE — 74011250636 HC RX REV CODE- 250/636: Performed by: SURGERY

## 2021-12-07 PROCEDURE — 74011250637 HC RX REV CODE- 250/637: Performed by: SURGERY

## 2021-12-07 PROCEDURE — 74011000250 HC RX REV CODE- 250: Performed by: SURGERY

## 2021-12-07 PROCEDURE — 85027 COMPLETE CBC AUTOMATED: CPT

## 2021-12-07 PROCEDURE — 36415 COLL VENOUS BLD VENIPUNCTURE: CPT

## 2021-12-07 RX ORDER — HYDROCODONE BITARTRATE AND ACETAMINOPHEN 5; 325 MG/1; MG/1
1-2 TABLET ORAL EVERY 8 HOURS
Qty: 28 TABLET | Refills: 0 | Status: SHIPPED | OUTPATIENT
Start: 2021-12-07 | End: 2021-12-14

## 2021-12-07 RX ADMIN — FAMOTIDINE 20 MG: 10 INJECTION INTRAVENOUS at 08:27

## 2021-12-07 RX ADMIN — CELECOXIB 100 MG: 100 CAPSULE ORAL at 08:27

## 2021-12-07 RX ADMIN — ACETAMINOPHEN 1000 MG: 500 TABLET ORAL at 05:46

## 2021-12-07 RX ADMIN — ACETAMINOPHEN 1000 MG: 500 TABLET ORAL at 00:07

## 2021-12-07 NOTE — PROGRESS NOTES
ERAS End of Shift    4 Days Post-Op     Sesay: No    Bowel Movement: No    Bowel Sounds: normal    DIET ADULT  DIET ADULT ORAL NUTRITION SUPPLEMENT     Tolerating Diet?: Yes    Ambulated 0 times. Pt ambulated to bathroom in room, not in agee. Up to chair for 0 meals.  (night shift)    Lidocaine: No    PRN Pain Medications Used?: No    IS Used: Yes    Ideal body weight: 66.1 kg (145 lb 11.6 oz)     Signed By: Renetta Lynn RN     December 7, 2021

## 2021-12-07 NOTE — PROGRESS NOTES
H&P/Consult Note/Progress Note/Office Note:   Alejandra Diana MRN: [id number]  :1965  Age:56 y.o.    HPI: Alejandra Diana is a 49 y.o. male who is here for laparoscopic-assisted sigmoid colectomy with coloproctostomy on 12/3/21. He has a h/o recurrent diverticulitis and came to the ER on 10/14/21 with worsening severe LLQ pain that began on 10/8/21. He reported he was treated with Augmentin  for suspected diverticulitis, but the pain worsened despite the Augmentin. Nothing in particular made his symptoms better. He reported associated nausea and fever. He had a percutaneous drain placed for diverticulitis in . He is s/p [facility] with mesh in     He is s/p colonoscopy on 2017 by Dr. Yao Fonseca. The colonoscopy report indicates \"excavated lesions and rare, shallow diverticulum\" in sigmoid colon  Otherwise normal study, repeat was recommended in 10 yrs ()          10/14/21 CT abd/pelvis with oral and IV contrast  Hx: LLQ abd pain currently on antibiotics with persistent pain.      LOWER CHEST: Normal.     HEPATOBILIARY: Subtle low-attenuation liver lesions are present most likely cysts and too small to characterize by CT. These may be slightly larger than on the prior exam from 2017. No new liver lesions are appreciated. No calcified gallstones.     PANCREAS: Normal.  SPLEEN: Normal.  ADRENAL GLANDS: Normal.  KIDNEYS/BLADDER: Kidneys and bladder are unremarkable. No urinary tract calculi or hydronephrosis.     BOWEL: No evidence of bowel obstruction. Appendix is normal.   There is marked inflammation and wall thickening in the region of the proximal sigmoid colon with surrounding mesenteric inflammation. There is likely a small intramural fluid collection involving the wall of the colon. No extra colonic fluid collection or free air is evident.    Several small probable reactive mesenteric nodes are present in this area.     LYMPH NODES: Small left lower quadrant mesenteric lymph nodes in the area of the colonic inflammation and cortical thickening. Small retroperitoneal nodes are also present but are not enlarged by CT criteria and appear stable. No enlarged pelvic lymph nodes.     VASCULATURE: Unremarkable. PELVIC ORGANS: Prostate gland and rectum are unremarkable. No free pelvic fluid. MUSCULOSKELETAL: Normal.     IMPRESSION  Colonic inflammation proximal sigmoid colon with probable intramural abscess involving the wall of the sigmoid colon. This measures approximately 1 cm in diameter. No extracolonic abscess or free intraperitoneal air. Small adjacent reactive lymph nodes are present.           10/20/21 CT abd/pelvis (follow-up study)      LOWER CHEST: Atelectatic lung base changes. No pleural fluid.     HEPATOBILIARY: Scattered low-attenuation liver lesions appear stable, most  likely cysts. No definite new or enlarging liver lesions. No calcified  gallstones. Probable vicarious excretion of previously administered intravenous  contrast noted in the gallbladder. This is a normal anatomic variant.     PANCREAS: Normal.  SPLEEN: Normal.  ADRENAL GLANDS: Normal.  KIDNEYS/BLADDER: Kidneys and bladder are unremarkable. No urinary tract calculi or hydronephrosis.     BOWEL: Persistent inflammation noted in the region of patient's preceding described diverticulitis. Small fluid collection in the wall of the sigmoid colon on image 66 of series 2 has diminished in size now measuring approximately 0.7 cm. No new extra colonic fluid collection to indicate a mesenteric abscess. No free intraperitoneal air.     LYMPH NODES: Small retroperitoneal and left common iliac lymph nodes, unchanged since prior exam.   These are not enlarged by CT criteria.     VASCULATURE: Unremarkable. PELVIC ORGANS: Prostate gland and rectum are unremarkable. No significant free pelvic fluid. MUSCULOSKELETAL: Normal.     IMPRESSION  1.  Interval improvement involving the inflammation and intramural abscess proximal sigmoid colon. No new extra colonic fluid collection to indicate an abscess. No free intraperitoneal air. 2. Stable low-attenuation liver lesions most likely cysts. 3. Stable subcentimeter retroperitoneal and left common iliac lymph nodes. None are enlarged by CT criteria. No new or enlarging lymph nodes.         11/3/21 CT abd/pelvis with oral and IV contrast  *  LUNG BASES: Within normal limits. *  LIVER: Multiple stable simple cysts. *  GALLBLADDER AND BILE DUCTS: Normal.  *  SPLEEN: Within normal limits. *  URINARY TRACT: Within normal limits. *  ADRENALS: Within normal limits. *  PANCREAS: Within normal limits. *  GASTROINTESTINAL TRACT: Normal appendix. Sigmoid diverticulitis improved. No evidence of an abscess. *  RETROPERITONEUM: Within normal limits. *  PERITONEAL CAVITY AND ABDOMINAL WALL: Within normal limits. *  PELVIS: Within normal limits. *  SPINE / BONES: Within normal limits. *  OTHER COMMENTS: None.     IMPRESSION  Sigmoid diverticulitis improved. No evidence for an abscess. Numerous stable hepatic cysts.              Additional hx:  10/15/21 c/o headache; AF/VSS; LLQ pain; WBC 12.6--->10.4k;  IV Cipro/Flagyl/ Bowel rest  10/16/21 LLQ pain +flatus; IV Abx  10/17/21 LLQ pain better IV Abx  10/18/21 LLQ pain resolved at present; AF; WBC normal; Hold off on TPN/PICC; Repeat CT Wednesday  10/19/21 No change- LLQ pain resolved; repeat CT tomorrow   10/20/21 feels OK; repeat CT today shows abscess 10mm--->now 7mm; Plan TPN for nutritional support with non-functioning GI tract; PICC line was placed  10/21/22 TPN begins today; Request insurance authorization for home TPN for bowel rest as outpt until the sigmoid colon wall abscess (which cannot be safely drained by IR) resolves    11/15/21 office visit; NO pain; no fevers;  Tolerating PO; PICC out; off abx; didn't want surgery  11/22/21 office visit; he now wants to proceed with sigmoid resection. 12/3/21 OR; Here for same day admission for sigmoid colon resection    12/4/21 POD1: Pt awake in bed. No complaints. Pain controlled. LORRIE drain 60mL serosanguineous output. AF, NAD.   12/5/21 POD2: Pt awake in bed. No complaints. Pain controlled. LORRIE drain 35mL serosanguineous output. +flatus/+BM.  AF, NAD.   12/6/21 POD3 AF/VSS; +flatus and BMs; trays of clear liquids today  12/7/21 POD4 AF/VSS Elizabeth PO; +BMs ; labs OK; wants to go home; discharge today          Past Medical History:   Diagnosis Date    Diverticulosis     H/O seasonal allergies     Unspecified sleep apnea     wears cpap     Past Surgical History:   Procedure Laterality Date    COLONOSCOPY N/A 12/13/2017    COLONOSCOPY  BMI 31 performed by Adrian Driver MD at [facility] 3 HX ORTHOPAEDIC Right 2009    rolled bicep    ME ABDOMEN SURGERY PROC UNLISTED  2008    perc drain diverticular abscess    ME COLONOSCOPY FLX DX W/COLLJ SPEC WHEN PFRMD  12/13/2017          Current Facility-Administered Medications   Medication Dose Route Frequency    dextrose 5% - 0.45% NaCl with KCl 20 mEq/L infusion  100 mL/hr IntraVENous CONTINUOUS    acetaminophen (TYLENOL) tablet 1,000 mg  1,000 mg Oral Q6H    celecoxib (CELEBREX) capsule 100 mg  100 mg Oral BID    naloxone (NARCAN) injection 0.4 mg  0.4 mg IntraVENous PRN    oxyCODONE IR (ROXICODONE) tablet 5 mg  5 mg Oral Q4H PRN    ondansetron (ZOFRAN ODT) tablet 4 mg  4 mg Oral Q4H PRN    enoxaparin (LOVENOX) injection 40 mg  40 mg SubCUTAneous Q24H    ondansetron (ZOFRAN) injection 4 mg  4 mg IntraVENous Q4H PRN    famotidine (PF) (PEPCID) 20 mg in 0.9% sodium chloride 10 mL injection  20 mg IntraVENous Q12H     Darvocet a500 [propoxyphene n-acetaminophen]  Social History     Socioeconomic History    Marital status:    Tobacco Use    Smoking status: Never Smoker    Smokeless tobacco: Never Used   Substance and Sexual Activity    Alcohol use: No    Drug use: No    Sexual activity: Yes     Partners: Female     Social History     Tobacco Use   Smoking Status Never Smoker   Smokeless Tobacco Never Used     Family History   Problem Relation Age of Onset    Diabetes Mother    Yissel Mora COPD Mother     Kidney Disease Mother     Heart Disease Mother     Hypertension Mother     Cancer Father         prostate    Other Father         abdominal aortic aneurysm    Sleep Apnea Father     Diabetes Maternal Grandmother     Diabetes Sister     Malignant Hyperthermia Neg Hx     Pseudocholinesterase Deficiency Neg Hx     Delayed Awakening Neg Hx     Post-op Nausea/Vomiting Neg Hx     Emergence Delirium Neg Hx     Post-op Cognitive Dysfunction Neg Hx      ROS: The patient has no difficulty with chest pain or shortness of breath. No fever or chills. Comprehensive review of systems was otherwise unremarkable except as noted above. Physical Exam:   Visit Vitals  /80   Pulse 69   Temp 98.5 °F (36.9 °C)   Resp 17   Ht 5' 7\" (1.702 m)   Wt 198 lb 3.2 oz (89.9 kg)   SpO2 96%   BMI 31.04 kg/m²     Vitals:    12/06/21 1528 12/06/21 1928 12/07/21 0012 12/07/21 0337   BP: 128/80 124/85 122/88 114/80   Pulse: 88 78 76 69   Resp: 18 17 17 17   Temp: 97.5 °F (36.4 °C) 98.3 °F (36.8 °C) 97.8 °F (36.6 °C) 98.5 °F (36.9 °C)   SpO2: 97% 96% 95% 96%   Weight:       Height:         10/20 1901 - 10/21 0700  In: -   Out: 200 [Urine:200]  10/19 0701 - 10/20 1900  In: 468 [I.V.:468]  Out: 1600 [Urine:1600]    Constitutional: Alert, oriented, cooperative patient in no acute distress; appears stated age    Eyes:Sclera are clear. EOMs intact  ENMT: no external lesions gross hearing normal; no obvious neck masses, no ear or lip lesions, nares normal  CV: RRR. Normal perfusion  Resp: No JVD. Breathing is  non-labored; no audible wheezing. GI: soft and non-distended; Inc OK; drain serosang     Musculoskeletal: unremarkable with normal function. No embolic signs or cyanosis.    Neuro: Oriented; moves all 4; no focal deficits  Psychiatric: normal affect and mood, no memory impairment    Recent vitals (if inpt):  Patient Vitals for the past 24 hrs:   BP Temp Pulse Resp SpO2   10/21/21 0324 105/61 97.6 °F (36.4 °C) 67 17 97 %   10/20/21 2329 133/72 97 °F (36.1 °C) 67 17 93 %   10/20/21 1944 (!) 155/83 98.3 °F (36.8 °C) 66 18 95 %   10/20/21 1519 134/71 98.1 °F (36.7 °C) 70 18 95 %   10/20/21 1141 120/81 98.6 °F (37 °C) 64 18 95 %   10/20/21 0743 128/71 98.1 °F (36.7 °C) 68 18 98 %       Amount and/or Complexity of Data Reviewed and Analyzed:  I reviewed and analyzed all of the unique labs and radiologic studies that are shown below as well as any that are in the HPI, and any that are in the expanded problem list below  *Each unique test, order, or document contributes to the combination of 2 or combination of 3 in Category 1 below. For this visit I also reviewed old records and prior notes.       Recent Labs     12/07/21  0554   WBC 6.5   HGB 11.2*         K 3.5   *   CO2 26   BUN 9   CREA 0.68*   *     Review of most recent CBC  Lab Results   Component Value Date/Time    WBC 6.5 12/07/2021 05:54 AM    HGB 11.2 (L) 12/07/2021 05:54 AM    HCT 34.2 (L) 12/07/2021 05:54 AM    PLATELET 527 63/13/3587 05:54 AM    MCV 90.0 12/07/2021 05:54 AM       Review of most recent BMP  Lab Results   Component Value Date/Time    Sodium 143 12/07/2021 05:54 AM    Potassium 3.5 12/07/2021 05:54 AM    Chloride 112 (H) 12/07/2021 05:54 AM    CO2 26 12/07/2021 05:54 AM    Anion gap 5 (L) 12/07/2021 05:54 AM    Glucose 109 (H) 12/07/2021 05:54 AM    BUN 9 12/07/2021 05:54 AM    Creatinine 0.68 (L) 12/07/2021 05:54 AM    BUN/Creatinine ratio 15 10/19/2020 07:21 AM    GFR est AA >60 12/07/2021 05:54 AM    GFR est non-AA >60 12/07/2021 05:54 AM    Calcium 8.4 12/07/2021 05:54 AM       Review of most recent LFTs (and lipase if done)  Lab Results   Component Value Date/Time    ALT (SGPT) 47 11/29/2021 01:40 PM    AST (SGOT) 31 11/29/2021 01:40 PM    Alk. phosphatase 61 11/29/2021 01:40 PM    Bilirubin, direct 0.1 10/21/2021 04:06 AM    Bilirubin, total 0.3 11/29/2021 01:40 PM     Lab Results   Component Value Date/Time    Lipase 56 (L) 10/14/2021 03:02 PM       Lab Results   Component Value Date/Time    INR  05/12/2008 08:55 PM     1.1  Suggested therapeutic INR range:  Venous thrombosis and embolus            2.0-3.0  Prosthetic heart valve                   2.5-3.5    aPTT 30.5  HEPARIN THERAPY RANGE:  48 - 75 SECONDS 05/12/2008 08:55 PM    Bilirubin, direct 0.1 10/21/2021 04:06 AM       Review of most recent HgbA1c  No results found for: HBA1C, ZLT7QOXB, LRI2PVJX, ZBU5CHDL    Nutritional assessment screen for wound healing issues:  Lab Results   Component Value Date/Time    Protein, total 8.3 (H) 11/29/2021 01:40 PM    Albumin 3.6 11/29/2021 01:40 PM       @lastcovr@  XR Results (most recent):  Results from Appointment encounter on 03/31/21    XR KNEE RT MIN 4 V    Narrative  AUTOMATIC ADMINISTRATIVE RESULT    The result for this exam can be found in the Progress note in the chart. Impression  :  See Progress note in the chart. CT Results (most recent):  Results from Hospital Encounter encounter on 11/03/21    CT ABD PELV W CONT    Narrative  CT ABDOMEN AND PELVIS WITH CONTRAST    HISTORY: Acute diverticulitis    COMPARISON: 10/20/2021    TECHNIQUE: Helical imaging was performed from the lung bases through the ischial  tuberosities during the intravenous infusion of 100 cc of Isovue-370. Oral  contrast was administered. Coronal and sagittal reformats were performed. Dose reduction techniques used: Automated exposure control, adjustment of the  mAs and/or kVp according to patient's size, and iterative reconstruction  techniques. FINDINGS:  *  LUNG BASES: Within normal limits. *  LIVER: Multiple stable simple cysts. *  GALLBLADDER AND BILE DUCTS: Normal.  *  SPLEEN: Within normal limits.   * URINARY TRACT: Within normal limits. *  ADRENALS: Within normal limits. *  PANCREAS: Within normal limits. *  GASTROINTESTINAL TRACT: Normal appendix. Sigmoid diverticulitis improved. No  evidence of an abscess. *  RETROPERITONEUM: Within normal limits. *  PERITONEAL CAVITY AND ABDOMINAL WALL: Within normal limits. *  PELVIS: Within normal limits. *  SPINE / BONES: Within normal limits. *  OTHER COMMENTS: None. Impression  Sigmoid diverticulitis improved. No evidence for an abscess. Numerous stable hepatic cysts. Date of Dictation: 11/3/2021 9:11 AM    US Results (most recent):  No results found for this or any previous visit.         Admission date (for inpatients): 12/3/2021   * No surgery found *  Procedure(s):  ERAS/COLON RESECTION SIGMOID LAPAROSCOPIC  HAND ASSISTED WITH  ANASTOMOSIS  COLOPROCTOSCOPY        ASSESSMENT/PLAN:  Problem List  Date Reviewed: 11/22/2021          Codes Class Noted    * (Principal) Diverticulitis of sigmoid colon ICD-10-CM: K57.32  ICD-9-CM: 562.11  12/3/2021        Mild protein-calorie malnutrition ([facility] [address] ICD-10-CM: E44.1  ICD-9-CM: 263.1  10/21/2021        Headache ICD-10-CM: R51.9  ICD-9-CM: 784.0  10/15/2021        Sigmoid diverticulitis ICD-10-CM: K57.32  ICD-9-CM: 562.11  10/14/2021        Abscess of sigmoid colon due to diverticulitis ICD-10-CM: K57.20  ICD-9-CM: 562.11, 569.5  10/14/2021        LLQ pain ICD-10-CM: R10.32  ICD-9-CM: 789.04  10/14/2021        Obesity (BMI 30.0-34.9) ICD-10-CM: E66.9  ICD-9-CM: 278.00  10/5/2020        Transient disorder of initiating or maintaining sleep ICD-10-CM: F51.02  ICD-9-CM: 307.41  10/5/2020        Hyperlipemia ICD-10-CM: E78.5  ICD-9-CM: 272.4  8/6/2014        FRENCH on CPAP ICD-10-CM: G47.33, Z99.89  ICD-9-CM: 327.23, V46.8  8/6/2014        HA (headache) ICD-10-CM: R51.9  ICD-9-CM: 784.0  8/6/2014        FH: prostate cancer ICD-10-CM: Z80.42  ICD-9-CM: V16.42  8/6/2014        FHx: AAA ICD-10-CM: [id number]  ICD-9-CM: Barrett Manuel 8/6/2014        Allergic rhinitis ICD-10-CM: J30.9  ICD-9-CM: 477.9  8/6/2014        Diverticulosis of colon without diverticulitis ICD-10-CM: K57.30  ICD-9-CM: 562.10  8/6/2014            Principal Problem:    Diverticulitis of sigmoid colon (12/3/2021)           Number and Complexity of Problems addressed and   Risks of complications and/or morbidity of management            Recurrent diverticulitis complicated by recent suspected 1cm intramural abscess in sigmoid colon  He is s/p laparoscopic-assisted sigmoid colectomy with coloproctostomy on 12/3/21    AF/VSS  WBC normal  +flatus and BMs  Tole PO  Wants to go home    Path pending      Home today            I have personally performed a face-to-face diagnostic evaluation and management  service on this patient. I have independently seen the patient. I have independently obtained the above history from the patient/family. I have independently examined the patient with above findings. I have independently reviewed data/labs for this patient and developed the above plan of care (MDM). Signed: Cristi Kapadia.  Geetha Mcgraw MD, FACS

## 2021-12-07 NOTE — PROGRESS NOTES
D/c paperwork reviewed with pt and wife at bedside. Ensures at bedside. All questions answered. Educated on emptying drain & how to change dressings. Some gauze and tape given to pt. Educated the importance of keeping up with output from drain to take to follow up. Wife and pt verbalized understanding of all info. No further questions at this time. No acute signs of distress at time of d/c.

## 2021-12-07 NOTE — PROGRESS NOTES
END OF SHIFT NOTE:    INTAKE/OUTPUT  12/06 0701 - 12/07 0700  In: 3057.3 [P.O.:780; I.V.:2277.3]  Out: 925 [Urine:675; Drains:250]  Voiding: YES  Catheter: NO  Drain:   Kerri Kay #1 12/03/21 Left; Lower Abdomen (Active)   Site Assessment Clean, dry, & intact 12/07/21 0012   Dressing Status Clean, dry, & intact 12/07/21 0012   Drainage Description Serosanguinous 12/07/21 0508   Jacobo Drain Airleak No 12/07/21 0012   Status Patent; Charged 12/07/21 0012   Y Connector Used No 12/07/21 0012   Output (ml) 50 ml 12/07/21 0508               Flatus: Patient does have flatus present. Stool:  0 occurrences. Characteristics:  Stool Assessment  Stool Color: Brown  Stool Appearance: Watery  Stool Amount: Medium  Stool Source/Status: Rectum    Emesis: 0 occurrences. Characteristics:        VITAL SIGNS  Patient Vitals for the past 12 hrs:   Temp Pulse Resp BP SpO2   12/07/21 0337 98.5 °F (36.9 °C) 69 17 114/80 96 %   12/07/21 0012 97.8 °F (36.6 °C) 76 17 122/88 95 %   12/06/21 1928 98.3 °F (36.8 °C) 78 17 124/85 96 %       Pain Assessment  Pain Intensity 1: 0 (12/06/21 1914)  Pain Location 1: Abdomen  Pain Intervention(s) 1: Declines  Patient Stated Pain Goal: 0    Ambulating  Yes    Shift report to be given to oncoming nurse at the bedside.     Gita Thompson RN

## 2021-12-07 NOTE — PROGRESS NOTES
Patient with discharge orders today. No further needs identified to CM. Family to transport patient home. Patient has met all milestones and treatment goals. CM will continue to follow until discharged home today.       Care Management Interventions  PCP Verified by CM:  (Dr Veena Singer [phone])  Mode of Transport at Discharge: Self  Transition of Care Consult (CM Consult): Discharge Planning  Support Systems: Spouse/Significant Other (Spouse: Ary Baird [phone])  Confirm Follow Up Transport: Self  The Plan for Transition of Care is Related to the Following Treatment Goals : return to baseline  The Patient and/or Patient Representative was Provided with a Choice of Provider and Agrees with the Discharge Plan?: Yes  Freedom of Choice List was Provided with Basic Dialogue that Supports the Patient's Individualized Plan of Care/Goals, Treatment Preferences and Shares the Quality Data Associated with the Providers?: Yes  [place] Resource Information Provided?: No  Discharge Location  Discharge Placement: Home with family assistance

## 2021-12-07 NOTE — PROGRESS NOTES
Problem: General Infection Care Plan (Adult and Pediatric)  Goal: Improvement in signs and symptoms of infection  Outcome: Progressing Towards Goal  Goal: *Optimize nutritional status  Outcome: Progressing Towards Goal     Problem: Patient Education: Go to Patient Education Activity  Goal: Patient/Family Education  Outcome: Progressing Towards Goal     Problem: Falls - Risk of  Goal: *Absence of Falls  Description: Document [facility] Fall Risk and appropriate interventions in the flowsheet.   Outcome: Progressing Towards Goal  Note: Fall Risk Interventions:            Medication Interventions: Patient to call before getting OOB    Elimination Interventions: Call light in reach              Problem: Patient Education: Go to Patient Education Activity  Goal: Patient/Family Education  Outcome: Progressing Towards Goal     Problem: Patient Education: Go to Patient Education Activity  Goal: Patient/Family Education  Outcome: Progressing Towards Goal     Problem: ERAS-Colorectal Surgery:Post-op Day 2 through Discharge  Goal: Off Pathway (Use only if patient is Off Pathway)  Outcome: Progressing Towards Goal  Goal: Activity/Safety  Outcome: Progressing Towards Goal  Goal: Diagnostic Test/Procedures  Outcome: Progressing Towards Goal  Goal: Nutrition/Diet  Outcome: Progressing Towards Goal  Goal: Discharge Planning  Outcome: Progressing Towards Goal  Goal: Medications  Outcome: Progressing Towards Goal  Goal: Respiratory  Outcome: Progressing Towards Goal  Goal: Treatments/Interventions/Procedures  Outcome: Progressing Towards Goal  Goal: Psychosocial  Outcome: Progressing Towards Goal  Goal: *No signs and symptoms of infection or wound complications  Outcome: Progressing Towards Goal  Goal: *Optimal pain control at patient's stated goal  Outcome: Progressing Towards Goal  Goal: *Adequate urinary output  Outcome: Progressing Towards Goal  Goal: *Hemodynamically stable  Outcome: Progressing Towards Goal  Goal: *Tolerating diet  Outcome: Progressing Towards Goal  Goal: *Demonstrates progressive activity  Outcome: Progressing Towards Goal  Goal: *Lungs clear or at baseline  Outcome: Progressing Towards Goal     Problem: ERAS-Colorectal Surger:Discharge Outcomes  Goal: *Hemodynamically stable  Outcome: Progressing Towards Goal  Goal: *Lungs clear or at baseline  Outcome: Progressing Towards Goal  Goal: *Demonstrates independent activity or return to baseline  Outcome: Progressing Towards Goal  Goal: *Optimal pain control at patient's stated goal  Outcome: Progressing Towards Goal  Goal: *Verbalizes understanding and describes prescribed diet  Outcome: Progressing Towards Goal  Goal: *Tolerating diet  Outcome: Progressing Towards Goal  Goal: *Verbalizes name, dosage, time, side effects, and number of days to continue medications  Outcome: Progressing Towards Goal  Goal: *No signs and symptoms of infection or wound complications  Outcome: Progressing Towards Goal  Goal: *Anxiety reduced or absent  Outcome: Progressing Towards Goal  Goal: *Understands and describes signs and symptoms to report to providers(Stroke Metric)  Outcome: Progressing Towards Goal  Goal: *Describes follow-up/return visits to physicians  Outcome: Progressing Towards Goal  Goal: *Describes available resources and support systems  Outcome: Progressing Towards Goal

## 2021-12-07 NOTE — DISCHARGE INSTRUCTIONS
Please send remainder for [facility] Immunonutrion Shakes home with patient at discharge. (Post op day 3 - day 7) Goal= 2 bottles/day    Dressings/Wound Care  Empty the drain as often as needed to keep it suctioning (compressed) at all times. Replace the dry gauze and tape around the drain exit site as needed for drainage. Try to keep incisions as dry as possible to lower risk of infection. Activity  No heavy lifting (>5lbs) for 6 weeks to reduce risk of developing a hernia in the incisions. No driving until you are off pain meds for 24hrs and have no pain with movements associated with driving. Pain prescription (Norco) electronically sent to your pharmacy  Follow-up with Dr Roger Solorzano in 9 days on a Thusrday in the office at:  [address], [address]  (Call for an appt time unless one is already made for you by discharge nurse which is preferred->[phone]-->option 1)    Diet  Soups, Juice, and Liquids until Thursday  Then, Soft diet until follow-up      DISCHARGE SUMMARY from Nurse    PATIENT INSTRUCTIONS:    After general anesthesia or intravenous sedation, for 24 hours or while taking prescription Narcotics:  · Limit your activities  · Do not drive and operate hazardous machinery  · Do not make important personal or business decisions  · Do  not drink alcoholic beverages  · If you have not urinated within 8 hours after discharge, please contact your surgeon on call. Report the following to your surgeon:  · Excessive pain, swelling, redness or odor of or around the surgical area  · Temperature over 100.5  · Nausea and vomiting lasting longer than 4 hours or if unable to take medications  · Any signs of decreased circulation or nerve impairment to extremity: change in color, persistent  numbness, tingling, coldness or increase pain  · Any questions    What to do at Home:  Recommended activity: Activity as tolerated, no heavy lifting over 5 lbs for 6 weeks.     If you experience any of the following symptoms fever greater than 100.5, nausea/vomiting lasting longer than 4 hours and not relieved by medication, increase in pain/swelling/redness/drainage/odor coming from surgical sites, please follow up with Dr. Amanda Johnson. **make sure to keep up with time, day, and amount that you are emptying from surgical drain. MAKE SURE YOU ARE WRITING IT ALL DOWN TO TAKE TO FOLLOW UP APPOINTMENT! *  Please give a list of your current medications to your Primary Care Provider. *  Please update this list whenever your medications are discontinued, doses are      changed, or new medications (including over-the-counter products) are added. *  Please carry medication information at all times in case of emergency situations. These are general instructions for a healthy lifestyle:    No smoking/ No tobacco products/ Avoid exposure to second hand smoke  Surgeon General's Warning:  Quitting smoking now greatly reduces serious risk to your health. Obesity, smoking, and sedentary lifestyle greatly increases your risk for illness    A healthy diet, regular physical exercise & weight monitoring are important for maintaining a healthy lifestyle    You may be retaining fluid if you have a history of heart failure or if you experience any of the following symptoms:  Weight gain of 3 pounds or more overnight or 5 pounds in a week, increased swelling in our hands or feet or shortness of breath while lying flat in bed. Please call your doctor as soon as you notice any of these symptoms; do not wait until your next office visit. The discharge information has been reviewed with the patient. The patient verbalized understanding. Discharge medications reviewed with the patient and appropriate educational materials and side effects teaching were provided.   ___________________________________________________________________________________________________________________________________ Surgical Drain Care: Care Instructions  Your Care Instructions     After a surgery, fluid may collect inside your body in the surgical area. This makes an infection or other problems more likely. A surgical drain allows the fluid to flow out. The doctor puts a thin, flexible rubber tube into the area of your body where the fluid is likely to collect. The rubber tube carries the fluid outside your body. The most common type of surgical drain carries the fluid into a collection bulb that you empty. This is called a Rasta-Desai (LORRIE) drain. The drain uses suction created by the bulb to pull the fluid from your body into the bulb. The rubber tube will probably be held in place by one or two stitches in your skin. The bulb will probably be attached with a safety pin to your clothes or near the bandage so that it doesn't flip around or pull on the stitches. Another type of drain is called a Penrose drain. This type of drain doesn't have a bulb. Instead, the end of the tube is open. That allows the fluid to drain onto a dressing taped to your skin. The drain may be kept in place next to your skin with a stitch or a safety pin in the tube. When you first get the drain, the fluid will be bloody. It will change color from red to pink to a light yellow or clear as the wound heals and the fluid starts to go away. Your doctor may give you information on when you no longer need the drain and when it will be removed. Follow-up care is a key part of your treatment and safety. Be sure to make and go to all appointments, and call your doctor if you are having problems. It's also a good idea to know your test results and keep a list of the medicines you take. How do you empty the bulb of a Rasta-Desai drain? Follow any instructions your doctor gives you. How often you empty the bulb depends on how much fluid is draining. Empty the bulb when it is half full. 1. Wash your hands with soap and water.   2. Take the plug out of the bulb. 3. Empty the bulb. If your doctor asks you to measure the fluid, empty the fluid into a measuring cup, and write down the color and how much you collected. Your doctor will want to know this information. 4. Clean the plug with alcohol. 5. Squeeze the bulb until it is flat. This removes all the air from the bulb. You may need to put the bulb on a table or a counter to flatten it. 6. Keep the bulb flat, and put the plug in. The bulb should stay flat after you put the plug back in. This creates the suction that pulls the fluid into the bulb. 7. Empty the fluid into the toilet. 8. Wash your hands. How do you change the dressing around your surgical drain? You may have a dressing (bandage). The dressing is often made of gauze pads held on with tape. Your doctor will tell you how often to change it. 1. Wash your hands with soap and water. 2. Take off the dressing from around the drain. 3. Clean the drain site and the skin around it with soap and water. Use gauze or a cotton swab. 4. When the site is dry, put on a new dressing. The way your dressing is put on depends on what kind of drain you have. You will get instructions for your type of drain. 5. Wash your hands again with soap and water. Your doctor may ask you to keep track of your dressing changes. Write down the time of day and the amount and color of the fluid on the dressing. How do you help prevent clogs in your surgical drain? Squeezing or \"milking\" the tube of your surgical drain can help prevent clogs so that it drains correctly. Your doctor will tell you when you need to do this. In general, you do this when:  · You see a clot in the tube that prevents fluid from draining. The clot may look like a dark, stringy lining. · You see fluid leaking around the tube where it goes into the skin. Follow these steps for milking the tube. 1. Use one hand to hold and pinch the tube where it leaves the skin.   2. With the thumb and first finger of your other hand, pinch the tube just below where you're holding it. 3. Slowly and firmly push your thumb and first finger down the tubing toward the end of the tube. 4. Repeat this as many times as needed to move the clot. If you have a Rasta-Desai (LORRIE) drain, the clot should move down the tube and into the bulb. If you have a Penrose drain, the clot should move into the dressing. When should you call for help? Call your doctor now or seek immediate medical care if:    · You have signs of infection, such as:  ? Increased pain, swelling, warmth, or redness around the area. ? Red streaks leading from the area. ? Pus draining from the area. ? A fever.     · You see a sudden change in the color or smell of the drainage.     · The tube is coming loose where it leaves your skin. Watch closely for changes in your health, and be sure to contact your doctor if:    · You see a lot of fluid around the drain.     · You cannot remove a clot from the tube by milking the tube. Where can you learn more? Go to http://www.gray.com/  Enter K117 in the search box to learn more about \"Surgical Drain Care: Care Instructions. \"  Current as of: July 1, 2021               Content Version: 13.0  © 2006-2021 [employer]. Care instructions adapted under license by [employer] (which disclaims liability or warranty for this information). If you have questions about a medical condition or this instruction, always ask your healthcare professional. [place] [address] any warranty or liability for your use of this information. Open Bowel Resection: What to Expect at [address] [place] [address] Drive are likely to have pain that comes and goes for the next few days after bowel surgery. You may have bowel cramps, and your cut (incision) may hurt. You may also feel like you have the flu. You may have a low fever and feel tired and nauseated. This is common. You should feel better after a week and will probably be back to normal in 2 to 3 weeks. This care sheet gives you a general idea about how long it will take for you to recover. But each person recovers at a different pace. Follow the steps below to get better as quickly as possible. How can you care for yourself at home? Activity    · Rest when you feel tired. Getting enough sleep will help you recover.     · Try to walk each day. Start by walking a little more than you did the day before. Bit by bit, increase the amount you walk. Walking boosts blood flow and helps prevent pneumonia and constipation.     · Avoid strenuous activities, such as biking, jogging, weight lifting, or aerobic exercise, until your doctor says it is okay.     · Ask your doctor when you can drive again.     · You will probably need to take 3 to 4 weeks off from work. It depends on the type of work you do and how you feel. You may need to take off 4 to 6 weeks if you lift heavy objects in your job.     · You may shower 24 to 48 hours after surgery, if your doctor says it is okay. Pat the cut (incision) dry. Do not take a bath for the first 2 weeks, or until your doctor tells you it is okay.     · Ask your doctor when it is okay for you to have sex. Diet    · You may not have much appetite after the surgery. But try to eat a healthy diet. Your doctor will tell you about any foods you should not eat.     · Eat a low-fiber diet for several weeks after surgery. Eat many small meals throughout the day. Add high-fiber foods a little at a time.     · Eat yogurt. It puts good bacteria into your colon and helps prevent diarrhea.     · Try to avoid nuts, seeds, and corn for a while. They may be hard to digest.     · You may need to take vitamins that contain sodium and potassium. Ask your doctor.     · Drink plenty of fluids to avoid becoming dehydrated. Medicines    · Your doctor will tell you if and when you can restart your medicines.  He or she will also give you instructions about taking any new medicines.     · If you take aspirin or some other blood thinner, ask your doctor if and when to start taking it again. Make sure that you understand exactly what your doctor wants you to do.     · Take pain medicines exactly as directed. ? If the doctor gave you a prescription medicine for pain, take it as prescribed. ? If you are not taking a prescription pain medicine, ask your doctor if you can take an over-the-counter medicine. ? Do not take two or more pain medicines at the same time unless the doctor told you to. Many pain medicines have acetaminophen, which is Tylenol. Too much acetaminophen (Tylenol) can be harmful.     · If you think your pain medicine is making you sick to your stomach:  ? Take your medicine after meals (unless your doctor tells you not to). ? Ask your doctor for a different pain medicine.     · If your doctor prescribed antibiotics, take them as directed. Do not stop taking them just because you feel better. You need to take the full course of antibiotics.     · You may need to take some medicines in a different form. You will be told whether to crush pills or take a liquid form of the medicine.     · If your doctor gives you a stool softener, take it as directed. Incision care    · If you have strips of tape on the incision, leave the tape on for a week or until it falls off.     · Wash the area daily with warm, soapy water, and pat it dry. Follow-up care is a key part of your treatment and safety. Be sure to make and go to all appointments, and call your doctor if you are having problems. It's also a good idea to know your test results and keep a list of the medicines you take. When should you call for help? Call 911 anytime you think you may need emergency care. For example, call if:    · You passed out (lost consciousness).     · You are short of breath.    Call your doctor now or seek immediate medical care if:    · You are sick to your stomach and cannot drink fluids or keep them down.     · You have signs of a blood clot in your leg (called a deep vein thrombosis), such as:  ? Pain in your calf, back of the knee, thigh, or groin. ? Redness and swelling in your leg or groin.     · You have signs of infection, such as:  ? Increased pain, swelling, warmth, or redness. ? Red streaks leading from the incision. ? Pus draining from the incision. ? A fever.     · You have pain that does not get better after you take pain medicine.     · You have loose stitches, or your incision comes open.     · Bright red blood has soaked through the bandage.     · You cannot pass stools or gas. Watch closely for any changes in your health, and be sure to contact your doctor if you have any problems. Where can you learn more? Go to http://www.gray.com/  Enter Z281 in the search box to learn more about \"Open Bowel Resection: What to Expect at Home. \"  Current as of: February 10, 2021               Content Version: 13.0  © 6465-6819 [employer]. Care instructions adapted under license by [employer] (which disclaims liability or warranty for this information). If you have questions about a medical condition or this instruction, always ask your healthcare professional. [place] [address] any warranty or liability for your use of this information.

## 2021-12-07 NOTE — DISCHARGE SUMMARY
[facility] [address]  [place], [address] [facility]  [phone]   Discharge Summary     Shae Lora MRN: [id number]     : 1965     Age: 49 y. o. Admit date: 12/3/2021     Discharge date:  21  Attending Physician: Sary Quiles MD, MD, FACS  Primary Discharge Diagnosis:   Principal Problem:    Diverticulitis of sigmoid colon (12/3/2021)      Primary Operations or Procedures Performed :  Procedure(s):  ERAS/COLON RESECTION SIGMOID LAPAROSCOPIC  HAND ASSISTED WITH  ANASTOMOSIS  COLOPROCTOSCOPY     Brief History and Reason for Admission: Shae Lora was admitted with the following history of present illness. HPI: Shae Lora is a 49 y.o. male who is here for laparoscopic-assisted sigmoid colectomy with coloproctostomy on 12/3/21.              He has a h/o recurrent diverticulitis and came to the ER on 10/14/21 with worsening severe LLQ pain that began on 10/8/21. He reported he was treated with Augmentin  for suspected diverticulitis, but the pain worsened despite the Augmentin. Nothing in particular made his symptoms better. He reported associated nausea and fever.     He had a percutaneous drain placed for diverticulitis in .     He is s/p [facility] with mesh in      He is s/p colonoscopy on 2017 by Dr. Abran Dao. The colonoscopy report indicates \"excavated lesions and rare, shallow diverticulum\" in sigmoid colon  Otherwise normal study, repeat was recommended in 10 yrs ()            10/14/21 CT abd/pelvis with oral and IV contrast  Hx: LLQ abd pain currently on antibiotics with persistent pain.      LOWER CHEST: Normal.     HEPATOBILIARY: Subtle low-attenuation liver lesions are present most likely cysts and too small to characterize by CT. These may be slightly larger than on the prior exam from 2017. No new liver lesions are appreciated.  No calcified gallstones.     PANCREAS: Normal.  SPLEEN: Normal.  ADRENAL GLANDS: Normal.  KIDNEYS/BLADDER: Kidneys and bladder are unremarkable. No urinary tract calculi or hydronephrosis.     BOWEL: No evidence of bowel obstruction. Appendix is normal.   There is marked inflammation and wall thickening in the region of the proximal sigmoid colon with surrounding mesenteric inflammation. There is likely a small intramural fluid collection involving the wall of the colon. No extra colonic fluid collection or free air is evident. Several small probable reactive mesenteric nodes are present in this area.     LYMPH NODES: Small left lower quadrant mesenteric lymph nodes in the area of the colonic inflammation and cortical thickening. Small retroperitoneal nodes are also present but are not enlarged by CT criteria and appear stable. No enlarged pelvic lymph nodes.     VASCULATURE: Unremarkable. PELVIC ORGANS: Prostate gland and rectum are unremarkable. No free pelvic fluid. MUSCULOSKELETAL: Normal.     IMPRESSION  Colonic inflammation proximal sigmoid colon with probable intramural abscess involving the wall of the sigmoid colon. This measures approximately 1 cm in diameter. No extracolonic abscess or free intraperitoneal air. Small adjacent reactive lymph nodes are present.              10/20/21 CT abd/pelvis (follow-up study)       LOWER CHEST: Atelectatic lung base changes. No pleural fluid.     HEPATOBILIARY: Scattered low-attenuation liver lesions appear stable, most  likely cysts. No definite new or enlarging liver lesions. No calcified  gallstones. Probable vicarious excretion of previously administered intravenous  contrast noted in the gallbladder. This is a normal anatomic variant.     PANCREAS: Normal.  SPLEEN: Normal.  ADRENAL GLANDS: Normal.  KIDNEYS/BLADDER: Kidneys and bladder are unremarkable.  No urinary tract calculi or hydronephrosis.     BOWEL: Persistent inflammation noted in the region of patient's preceding described diverticulitis. Small fluid collection in the wall of the sigmoid colon on image 66 of series 2 has diminished in size now measuring approximately 0.7 cm. No new extra colonic fluid collection to indicate a mesenteric abscess. No free intraperitoneal air.     LYMPH NODES: Small retroperitoneal and left common iliac lymph nodes, unchanged since prior exam.   These are not enlarged by CT criteria.     VASCULATURE: Unremarkable. PELVIC ORGANS: Prostate gland and rectum are unremarkable. No significant free pelvic fluid. MUSCULOSKELETAL: Normal.     IMPRESSION  1. Interval improvement involving the inflammation and intramural abscess proximal sigmoid colon. No new extra colonic fluid collection to indicate an abscess. No free intraperitoneal air. 2. Stable low-attenuation liver lesions most likely cysts. 3. Stable subcentimeter retroperitoneal and left common iliac lymph nodes. None are enlarged by CT criteria. No new or enlarging lymph nodes.           11/3/21 CT abd/pelvis with oral and IV contrast  *  LUNG BASES: Within normal limits. *  LIVER: Multiple stable simple cysts. *  GALLBLADDER AND BILE DUCTS: Normal.  *  SPLEEN: Within normal limits. Sonia [facility] TRACT: Within normal limits. *  ADRENALS: Within normal limits. *  PANCREAS: Within normal limits. *  GASTROINTESTINAL TRACT: Normal appendix. Sigmoid diverticulitis improved. No evidence of an abscess. *  RETROPERITONEUM: Within normal limits. *  PERITONEAL CAVITY AND ABDOMINAL WALL: Within normal limits. *  PELVIS: Within normal limits. *  SPINE / BONES: Within normal limits. *  OTHER COMMENTS: None.     IMPRESSION  Sigmoid diverticulitis improved. No evidence for an abscess.   Numerous stable hepatic cysts.                 Additional hx:  10/15/21 c/o headache; AF/VSS; LLQ pain; WBC 12.6--->10.4k;  IV Cipro/Flagyl/ Bowel rest  10/16/21 LLQ pain +flatus; IV Abx  10/17/21 LLQ pain better IV Abx  10/18/21 LLQ pain resolved at present; AF; WBC normal; Hold off on TPN/PICC; Repeat CT Wednesday  10/19/21 No change- LLQ pain resolved; repeat CT tomorrow   10/20/21 feels OK; repeat CT today shows abscess 10mm--->now 7mm; Plan TPN for nutritional support with non-functioning GI tract; PICC line was placed  10/21/22 TPN begins today; Request insurance authorization for home TPN for bowel rest as outpt until the sigmoid colon wall abscess (which cannot be safely drained by IR) resolves     11/15/21 office visit; NO pain; no fevers; Tolerating PO; PICC out; off abx; didn't want surgery  11/22/21 office visit; he now wants to proceed with sigmoid resection.              Hospital Course:       12/3/21 OR; Here for same day admission after sigmoid colon resection  12/4/21 POD1: Pt awake in bed. No complaints. Pain controlled. LORRIE drain 60mL serosanguineous output. AF, NAD.   12/5/21 POD2: Pt awake in bed. No complaints. Pain controlled. LORRIE drain 35mL serosanguineous output. +flatus/+BM. AF, NAD.   12/6/21 POD3 AF/VSS; +flatus and BMs; trays of clear liquids today  12/7/21 POD4 AF/VSS Elizabeth PO; +BMs ; labs OK; wants to go home; discharge today            Condition at Discharge: good    Discharge Medications:   Current Discharge Medication List      START taking these medications    Details   HYDROcodone-acetaminophen (Norco) 5-325 mg per tablet Take 1-2 Tablets by mouth every eight (8) hours for 7 days. Max Daily Amount: 6 Tablets. Qty: 28 Tablet, Refills: 0    Associated Diagnoses: S/P partial colectomy         CONTINUE these medications which have NOT CHANGED    Details   loratadine (CLARITIN) 10 mg tablet Take 10 mg by mouth daily. Associated Diagnoses:  Allergic rhinitis         STOP taking these medications       metroNIDAZOLE (FlagyL) 500 mg tablet Comments:   Reason for Stopping:         ciprofloxacin HCl (CIPRO) 750 mg tablet Comments:   Reason for Stopping:                 Disposition/Discharge Instructions/Follow-up Care:          Dressings/Wound Care  Empty the drain as often as needed to keep it suctioning (compressed) at all times. Replace the dry gauze and tape around the drain exit site as needed for drainage. Try to keep incisions as dry as possible to lower risk of infection. Activity  No heavy lifting (>5lbs) for 6 weeks to reduce risk of developing a hernia in the incisions. No driving until you are off pain meds for 24hrs and have no pain with movements associated with driving.     Pain prescription (Norco) electronically sent to your pharmacy  Follow-up with Dr Villar in 9 days on a Thusrday in the office at:  [address], [address]  (Call for an appt time unless one is already made for you by discharge nurse which is preferred->[phone]-->option 1)    Diet  Soups, Juice, and Liquids until Thursday  Then, Soft diet until follow-up      Signed:  Katina Shea MD, FACS   12/7/2021  6:53 AM

## 2022-03-18 PROBLEM — K57.32 SIGMOID DIVERTICULITIS: Status: ACTIVE | Noted: 2021-10-14

## 2022-03-18 PROBLEM — E44.1 MILD PROTEIN-CALORIE MALNUTRITION (HCC): Status: ACTIVE | Noted: 2021-10-21

## 2022-03-19 PROBLEM — R10.32 LLQ PAIN: Status: ACTIVE | Noted: 2021-10-14

## 2022-03-19 PROBLEM — K57.20 ABSCESS OF SIGMOID COLON DUE TO DIVERTICULITIS: Status: ACTIVE | Noted: 2021-10-14

## 2022-03-19 PROBLEM — R51.9 HEADACHE: Status: ACTIVE | Noted: 2021-10-15

## 2022-03-19 PROBLEM — F51.02 TRANSIENT DISORDER OF INITIATING OR MAINTAINING SLEEP: Status: ACTIVE | Noted: 2020-10-05

## 2022-03-20 PROBLEM — E66.811 OBESITY (BMI 30.0-34.9): Status: ACTIVE | Noted: 2020-10-05

## 2022-03-20 PROBLEM — E66.9 OBESITY (BMI 30.0-34.9): Status: ACTIVE | Noted: 2020-10-05

## 2022-11-25 DIAGNOSIS — Z00.00 ROUTINE GENERAL MEDICAL EXAMINATION AT A HEALTH CARE FACILITY: Primary | ICD-10-CM

## 2022-11-28 ENCOUNTER — NURSE ONLY (OUTPATIENT)
Dept: FAMILY MEDICINE CLINIC | Facility: CLINIC | Age: 57
End: 2022-11-28

## 2022-11-28 DIAGNOSIS — Z00.00 ROUTINE GENERAL MEDICAL EXAMINATION AT A HEALTH CARE FACILITY: ICD-10-CM

## 2022-11-28 LAB
ALBUMIN SERPL-MCNC: 4 G/DL (ref 3.5–5)
ALBUMIN/GLOB SERPL: 1.1 {RATIO} (ref 0.4–1.6)
ALP SERPL-CCNC: 71 U/L (ref 50–136)
ALT SERPL-CCNC: 56 U/L (ref 12–65)
ANION GAP SERPL CALC-SCNC: 9 MMOL/L (ref 2–11)
APPEARANCE UR: NORMAL
AST SERPL-CCNC: 28 U/L (ref 15–37)
BASOPHILS # BLD: 0 K/UL (ref 0–0.2)
BASOPHILS NFR BLD: 0 % (ref 0–2)
BILIRUB SERPL-MCNC: 0.5 MG/DL (ref 0.2–1.1)
BILIRUB UR QL: NEGATIVE
BUN SERPL-MCNC: 17 MG/DL (ref 6–23)
CALCIUM SERPL-MCNC: 9.2 MG/DL (ref 8.3–10.4)
CHLORIDE SERPL-SCNC: 107 MMOL/L (ref 101–110)
CHOLEST SERPL-MCNC: 238 MG/DL
CO2 SERPL-SCNC: 25 MMOL/L (ref 21–32)
COLOR UR: NORMAL
CREAT SERPL-MCNC: 1 MG/DL (ref 0.8–1.5)
DIFFERENTIAL METHOD BLD: NORMAL
EOSINOPHIL # BLD: 0.3 K/UL (ref 0–0.8)
EOSINOPHIL NFR BLD: 4 % (ref 0.5–7.8)
ERYTHROCYTE [DISTWIDTH] IN BLOOD BY AUTOMATED COUNT: 12.3 % (ref 11.9–14.6)
GLOBULIN SER CALC-MCNC: 3.7 G/DL (ref 2.8–4.5)
GLUCOSE SERPL-MCNC: 101 MG/DL (ref 65–100)
GLUCOSE UR STRIP.AUTO-MCNC: NEGATIVE MG/DL
HCT VFR BLD AUTO: 44.5 % (ref 41.1–50.3)
HDLC SERPL-MCNC: 54 MG/DL (ref 40–60)
HDLC SERPL: 4.4 {RATIO}
HGB BLD-MCNC: 14.8 G/DL (ref 13.6–17.2)
HGB UR QL STRIP: NEGATIVE
IMM GRANULOCYTES # BLD AUTO: 0 K/UL (ref 0–0.5)
IMM GRANULOCYTES NFR BLD AUTO: 0 % (ref 0–5)
KETONES UR QL STRIP.AUTO: NEGATIVE MG/DL
LDLC SERPL CALC-MCNC: 159.6 MG/DL
LEUKOCYTE ESTERASE UR QL STRIP.AUTO: NEGATIVE
LYMPHOCYTES # BLD: 2.5 K/UL (ref 0.5–4.6)
LYMPHOCYTES NFR BLD: 40 % (ref 13–44)
MCH RBC QN AUTO: 30.8 PG (ref 26.1–32.9)
MCHC RBC AUTO-ENTMCNC: 33.3 G/DL (ref 31.4–35)
MCV RBC AUTO: 92.5 FL (ref 82–102)
MONOCYTES # BLD: 0.5 K/UL (ref 0.1–1.3)
MONOCYTES NFR BLD: 8 % (ref 4–12)
NEUTS SEG # BLD: 3 K/UL (ref 1.7–8.2)
NEUTS SEG NFR BLD: 48 % (ref 43–78)
NITRITE UR QL STRIP.AUTO: NEGATIVE
NRBC # BLD: 0 K/UL (ref 0–0.2)
PH UR STRIP: 5 [PH] (ref 5–9)
PLATELET # BLD AUTO: 278 K/UL (ref 150–450)
PMV BLD AUTO: 10.3 FL (ref 9.4–12.3)
POTASSIUM SERPL-SCNC: 3.9 MMOL/L (ref 3.5–5.1)
PROT SERPL-MCNC: 7.7 G/DL (ref 6.3–8.2)
PROT UR STRIP-MCNC: NEGATIVE MG/DL
PSA SERPL-MCNC: 1.6 NG/ML
RBC # BLD AUTO: 4.81 M/UL (ref 4.23–5.6)
SODIUM SERPL-SCNC: 141 MMOL/L (ref 133–143)
SP GR UR REFRACTOMETRY: 1.02 (ref 1–1.02)
TRIGL SERPL-MCNC: 122 MG/DL (ref 35–150)
TSH, 3RD GENERATION: 1.99 UIU/ML (ref 0.36–3.74)
UROBILINOGEN UR QL STRIP.AUTO: 0.2 EU/DL (ref 0.2–1)
VLDLC SERPL CALC-MCNC: 24.4 MG/DL (ref 6–23)
WBC # BLD AUTO: 6.3 K/UL (ref 4.3–11.1)

## 2022-12-06 ENCOUNTER — OFFICE VISIT (OUTPATIENT)
Dept: FAMILY MEDICINE CLINIC | Facility: CLINIC | Age: 57
End: 2022-12-06
Payer: COMMERCIAL

## 2022-12-06 VITALS
BODY MASS INDEX: 34.21 KG/M2 | OXYGEN SATURATION: 97 % | WEIGHT: 218 LBS | HEIGHT: 67 IN | TEMPERATURE: 97.2 F | HEART RATE: 91 BPM

## 2022-12-06 DIAGNOSIS — J30.1 SEASONAL ALLERGIC RHINITIS DUE TO POLLEN: ICD-10-CM

## 2022-12-06 DIAGNOSIS — E78.5 HYPERLIPIDEMIA, UNSPECIFIED HYPERLIPIDEMIA TYPE: ICD-10-CM

## 2022-12-06 DIAGNOSIS — Z00.00 ROUTINE GENERAL MEDICAL EXAMINATION AT A HEALTH CARE FACILITY: Primary | ICD-10-CM

## 2022-12-06 DIAGNOSIS — Z87.19 H/O DIVERTICULITIS OF COLON: ICD-10-CM

## 2022-12-06 DIAGNOSIS — G47.33 OBSTRUCTIVE SLEEP APNEA (ADULT) (PEDIATRIC): ICD-10-CM

## 2022-12-06 PROCEDURE — 99396 PREV VISIT EST AGE 40-64: CPT | Performed by: FAMILY MEDICINE

## 2022-12-11 ASSESSMENT — ENCOUNTER SYMPTOMS
GASTROINTESTINAL NEGATIVE: 1
RESPIRATORY NEGATIVE: 1
EYES NEGATIVE: 1

## 2022-12-12 NOTE — PROGRESS NOTES
HISTORY OF PRESENT ILLNESS  Carolann Favre. is a 56 y.o. y.o. male    Other  This is a new (physical) problem. The current episode started today. The problem has been unchanged. Associated symptoms include congestion. Sleep Problem  This is a recurrent (apnea on C-pap) problem. The current episode started more than 1 year ago. The problem has been unchanged. Associated symptoms include congestion. Weight Management  This is a recurrent problem. The problem has been gradually worsening. Associated symptoms include congestion. Allergic Rhinitis   Presents for follow-up visit. He complains of congestion. The problem occurs daily. Not on File     Current Outpatient Medications   Medication Sig    loratadine (CLARITIN) 10 MG tablet Take 10 mg by mouth daily     No current facility-administered medications for this visit.         Past Medical History:   Diagnosis Date    Diverticulosis     H/O seasonal allergies     Unspecified sleep apnea     wears cpap        Past Surgical History:   Procedure Laterality Date    COLONOSCOPY N/A 12/13/2017    COLONOSCOPY  BMI 31 performed by Charisse Carrel, MD at [address] ([place] [address]) FLX DX W/COLLJ SPEC WHEN PFRMD  12/13/2017         HERNIA REPAIR      ORTHOPEDIC SURGERY Right 2009    rolled bicep    MO ABDOMEN SURGERY PROC UNLISTED  2008    perc drain diverticular abscess        Social History     Socioeconomic History    Marital status:      Spouse name: Not on file    Number of children: Not on file    Years of education: Not on file    Highest education level: Not on file   Occupational History    Not on file   Tobacco Use    Smoking status: Never    Smokeless tobacco: Never   Substance and Sexual Activity    Alcohol use: No    Drug use: No    Sexual activity: Not on file   Other Topics Concern    Not on file   Social History Narrative    Not on file     Social Determinants of Health     Financial Resource Strain: Not on file   Food Insecurity: Not on file   Transportation Needs: Not on file   Physical Activity: Not on file   Stress: Not on file   Social Connections: Not on file   Intimate Partner Violence: Not on file   Housing Stability: Not on file        Review of Systems   Constitutional: Negative. HENT:  Positive for congestion. Eyes: Negative. Respiratory: Negative. Cardiovascular: Negative. Gastrointestinal: Negative. Endocrine: Negative. Genitourinary: Negative. Skin: Negative. Neurological: Negative. Psychiatric/Behavioral: Negative. Pulse 91   Temp 97.2 °F (36.2 °C) (Temporal)   Ht 5' 7\" (1.702 m)   Wt 218 lb (98.9 kg)   SpO2 97%   BMI 34.14 kg/m²      Physical Exam  Constitutional:       General: He is not in acute distress. Appearance: Normal appearance. HENT:      Head: Normocephalic. Nose: Nose normal.   Eyes:      Conjunctiva/sclera: Conjunctivae normal.   Cardiovascular:      Rate and Rhythm: Normal rate. Pulmonary:      Effort: Pulmonary effort is normal.      Breath sounds: Normal breath sounds. Abdominal:      General: Abdomen is flat. Bowel sounds are normal.      Palpations: Abdomen is soft. Musculoskeletal:         General: Normal range of motion. Cervical back: Normal range of motion. Skin:     General: Skin is warm and dry. Neurological:      General: No focal deficit present. Mental Status: He is alert.    Psychiatric:         Mood and Affect: Mood normal.       Nurse Only on 11/28/2022   Component Date Value Ref Range Status    WBC 11/28/2022 6.3  4.3 - 11.1 K/uL Final    RBC 11/28/2022 4.81  4.23 - 5.6 M/uL Final    Hemoglobin 11/28/2022 14.8  13.6 - 17.2 g/dL Final    Hematocrit 11/28/2022 44.5  41.1 - 50.3 % Final    MCV 11/28/2022 92.5  82 - 102 FL Final    MCH 11/28/2022 30.8  26.1 - 32.9 PG Final    MCHC 11/28/2022 33.3  31.4 - 35.0 g/dL Final    RDW 11/28/2022 12.3  11.9 - 14.6 % Final    Platelets 70/79/1113 278  150 - 450 K/uL Final    MPV 11/28/2022 10.3  9.4 - 12.3 FL Final    nRBC 11/28/2022 0.00  0.0 - 0.2 K/uL Final    **Note: Absolute NRBC parameter is now reported with Hemogram**    Differential Type 11/28/2022 AUTOMATED    Final    Seg Neutrophils 11/28/2022 48  43 - 78 % Final    Lymphocytes 11/28/2022 40  13 - 44 % Final    Monocytes 11/28/2022 8  4.0 - 12.0 % Final    Eosinophils % 11/28/2022 4  0.5 - 7.8 % Final    Basophils 11/28/2022 0  0.0 - 2.0 % Final    Immature Granulocytes 11/28/2022 0  0.0 - 5.0 % Final    Segs Absolute 11/28/2022 3.0  1.7 - 8.2 K/UL Final    Absolute Lymph # 11/28/2022 2.5  0.5 - 4.6 K/UL Final    Absolute Mono # 11/28/2022 0.5  0.1 - 1.3 K/UL Final    Absolute Eos # 11/28/2022 0.3  0.0 - 0.8 K/UL Final    Basophils Absolute 11/28/2022 0.0  0.0 - 0.2 K/UL Final    Absolute Immature Granulocyte 11/28/2022 0.0  0.0 - 0.5 K/UL Final    Sodium 11/28/2022 141  133 - 143 mmol/L Final    Potassium 11/28/2022 3.9  3.5 - 5.1 mmol/L Final    Chloride 11/28/2022 107  101 - 110 mmol/L Final    CO2 11/28/2022 25  21 - 32 mmol/L Final    Anion Gap 11/28/2022 9  2 - 11 mmol/L Final    Glucose 11/28/2022 101 (A)  65 - 100 mg/dL Final    BUN 11/28/2022 17  6 - 23 MG/DL Final    Creatinine 11/28/2022 1.00  0.8 - 1.5 MG/DL Final    Est, Glom Filt Rate 11/28/2022 >60  >60 ml/min/1.73m2 Final    Comment:   Pediatric calculator link: Polly.at. org/professionals/kdoqi/gfr_calculatorped    Effective Oct 3, 2022    These results are not intended for use in patients <25years of age. eGFR results are calculated without a race factor using  the 2021 CKD-EPI equation. Careful clinical correlation is recommended, particularly when comparing to results calculated using previous equations. The CKD-EPI equation is less accurate in patients with extremes of muscle mass, extra-renal metabolism of creatinine, excessive creatine ingestion, or following therapy that affects renal tubular secretion.       Calcium 11/28/2022 9.2  8.3 - 10.4 MG/DL Final    Total Bilirubin 11/28/2022 0.5  0.2 - 1.1 MG/DL Final    ALT 11/28/2022 56  12 - 65 U/L Final    AST 11/28/2022 28  15 - 37 U/L Final    Alk Phosphatase 11/28/2022 71  50 - 136 U/L Final    Total Protein 11/28/2022 7.7  6.3 - 8.2 g/dL Final    Albumin 11/28/2022 4.0  3.5 - 5.0 g/dL Final    Globulin 11/28/2022 3.7  2.8 - 4.5 g/dL Final    Albumin/Globulin Ratio 11/28/2022 1.1  0.4 - 1.6   Final    Cholesterol, Total 11/28/2022 238 (A)  <200 MG/DL Final    Comment: Borderline High: 200-239 mg/dL  High: Greater than or equal to 240 mg/dL      Triglycerides 11/28/2022 122  35 - 150 MG/DL Final    Comment: Borderline High: 150-199 mg/dL, High: 200-499 mg/dL  Very High: Greater than or equal to 500 mg/dL      HDL 11/28/2022 54  40 - 60 MG/DL Final    LDL Calculated 11/28/2022 159.6 (A)  <100 MG/DL Final    Comment: Near Optimal: 100-129 mg/dL  Borderline High: 130-159, High: 160-189 mg/dL  Very High: Greater than or equal to 190 mg/dL      VLDL Cholesterol Calculated 11/28/2022 24.4 (A)  6.0 - 23.0 MG/DL Final    Chol/HDL Ratio 11/28/2022 4.4    Final    TSH, 3RD GENERATION 11/28/2022 1.990  0.358 - 3.740 uIU/mL Final    PSA 11/28/2022 1.6  <4.0 ng/mL Final    Comment: [employer].   New method in use, please reestablish patient baseline      Color, UA 11/28/2022 YELLOW/STRAW    Final    Color Reference Range: Straw, Yellow or Dark Yellow    Appearance 11/28/2022 TURBID    Final    Specific [place], UA 11/28/2022 1.021  1.001 - 1.023   Final    pH, Urine 11/28/2022 5.0  5.0 - 9.0   Final    Protein, UA 11/28/2022 Negative  Negative mg/dL Final    Glucose, UA 11/28/2022 Negative  mg/dL Final    Ketones, Urine 11/28/2022 Negative  Negative mg/dL Final    Bilirubin Urine 11/28/2022 Negative  Negative   Final    Blood, Urine 11/28/2022 Negative  Negative   Final    Urobilinogen, Urine 11/28/2022 0.2  0.2 - 1.0 EU/dL Final    Nitrite, Urine 11/28/2022 Negative  Negative   Final    Leukocyte Esterase, Urine 11/28/2022 Negative  Negative   Final       ASSESSMENT and PLAN    Routine general medical examination at a health care facility  Hyperlipidemia, unspecified hyperlipidemia type  Obstructive sleep apnea (adult) (pediatric)  Seasonal allergic rhinitis due to pollen  H/O diverticulitis of colon    The patient was seen today for the annual preventive health visit. The patient was provided anticipatory guidance and counseling regarding age / sex appropriate health maintenance issues including vaccinations, blood pressure screening, lipid screening, cancer screenings, diet, exercise, avoidance of tobacco / substance abuse. Current treatment plan is effective. no changes in therapy  lab results and schedule for future lab studies reviewed with patient  reviewed diet, exercise and weight control     No follow-ups on file.      Иван Sanchez MD

## 2022-12-20 ENCOUNTER — OFFICE VISIT (OUTPATIENT)
Dept: ORTHOPEDIC SURGERY | Age: 57
End: 2022-12-20

## 2022-12-20 VITALS — WEIGHT: 218 LBS | HEIGHT: 67 IN | BODY MASS INDEX: 34.21 KG/M2

## 2022-12-20 DIAGNOSIS — S46.212A RUPTURE OF LEFT DISTAL BICEPS TENDON, INITIAL ENCOUNTER: Primary | ICD-10-CM

## 2022-12-20 NOTE — LETTER
Phone: [phone]                                                                    Fax: [phone]                                   Worker's Compensation Surgical Request       Date:12/20/22     Patient Randall Councilman. MDB:[id number]   [facility]:[id number]     Surgeon: Dr. Marlene Mendes    Requested Procedure: Left Distal Bicep Repair    Diagnoses:    ICD-10-CM    1.  Rupture of left distal biceps tendon, initial encounter  S46.212A CANCELED: XR ELBOW LEFT (MIN 3 VIEWS)           CPT codes: 81140

## 2022-12-20 NOTE — PROGRESS NOTES
Orthopaedic Hand Clinic Note    Name: Adam Green. YOB: 1965  Gender: male  MRN: [id number]      CC: Patient referred for evaluation of upper extremity pain    HPI: Adam Quevedo is a 56 y.o. male Right hand dominant with a chief complaint of left elbow pain, symptoms started 7 weeks ago when the patient was at work lifting something and he felt a pop in the left elbow, he has had a right sided distal bicep tear that was repaired 3 years ago and he did very well with that, he reports that this 1 although felt the same it did not bruise as much as the right side. Since then he has had pain in the left side that severely affects his everyday life as well as work duties. ROS/Meds/PSH/PMH/FH/SH: I personally reviewed the patients standard intake form. Pertinents are discussed in the HPI    Physical Examination:  General: Awake and alert. HEENT: Normocephalic, atraumatic  CV/Pulm: Breathing even and unlabored  Skin: No obvious rashes noted. Lymphatic: No obvious evidence of lymphedema or lymphadenopathy    Musculoskeletal Exam:  Examination on the left upper extremity demonstrates cap refill < 5 seconds in all fingers, normal sensation all fingers, full passive elbow motion, there is a equivocal hook test on the left side, discomfort with palpation of the bicep tendon area, weakness to supination and elbow flexion strength. Imaging / Electrodiagnostic Tests:     MRI of the left elbow was reviewed and independently interpreted, this demonstrates diffuse increased T2 signal within the bicipital tuberosity as well as along the biceps tendon sheath, there appears to be either a very high-grade partial-thickness tear or a complete tear of the distal biceps without retraction    Assessment:   1. Rupture of left distal biceps tendon, initial encounter        Plan:   We discussed the diagnosis and different treatment options.  We discussed observation, therapy, antiinflammatory medications and other pertinent treatment modalities. After discussing in detail the patient elects to proceed with left distal bicep tendon repair, we discussed that it has been 7 weeks since his injury, this makes repair more difficult, he understands there is a 10% chance of retear through life or 10% chance that the tendon does not heal, he understands possibility of PIN injury as well as lateral antebrachial cutaneous nerve injury during surgery. Patient understands risk and benefits of surgery and wished to proceed. Patient understands risks and benefits of LEFT DISTAL BICEP TENDON REPAIR including but not limited to nerve injury, vessel injury, infection, failure to achieve desired results and possible need for additional surgery. Patient understands and wishes to proceed with surgery. On Exam:   The patient is alert and oriented; ;   Lung auscultation is clear bilaterally   Heart has RRR without murmurs       Patient voiced accordance and understanding of the information provided and the formulated plan. All questions were answered to the patient's satisfaction during the encounter.     Susana Vogel MD  Orthopaedic Surgery  12/20/22  8:40 AM

## 2022-12-20 NOTE — PROGRESS NOTES
Patient verified name and     Order for consent WAS NOT found in EHR and matches case posting; patient verified. Type 1B surgery, PHONE assessment complete. Labs per surgeon: NONE  Labs per anesthesia protocol: NONE  EKG: NONE  . Hospital approved surgical skin cleanser and instructions given per hospital policy. Patient provided with and instructed on educational handouts including Guide to Surgery, Pain Management, Hand Hygiene, Blood Transfusion Education, and [facility] Brochure. Patient answered medical/surgical history questions at their best of ability. All prior to admission medications documented in [facility]. Original medication prescription bottle WAS NOT visualized during patient appointment. Patient instructed to hold all vitamins 7 days prior to surgery and NSAIDS 5 days prior to surgery, patient verbalized understanding. Patient teach back successful and patient demonstrates knowledge of instructions.

## 2022-12-20 NOTE — PROGRESS NOTES
PLEASE CONTINUE TAKING ALL PRESCRIPTION MEDICATIONS UP TO THE DAY OF SURGERY UNLESS OTHERWISE DIRECTED BELOW. DISCONTINUE all vitamins and supplements 7 days prior to surgery. DISCONTINUE Non-Steriodal Anti-Inflammatory (NSAIDS) such as Advil and Aleve 5 days prior to surgery. Home Medications to take  the day of surgery    claritin           Home Medications   to Hold   none        Comments      On the day before surgery please take Acetaminophen 1000mg in the morning and then again before bed. You may substitute for Tylenol 650 mg. Please do not bring home medications with you on the day of surgery unless otherwise directed by your nurse. If you are instructed to bring home medications, please give them to your nurse as they will be administered by the nursing staff. If you have any questions, please call [address] [place] [address] [phone] or [facility] [phone]. A copy of this note was provided to the patient for reference.

## 2022-12-20 NOTE — LETTER
[address] [place]  Phone: [phone]  Fax: [phone]    Shelbie Wilson MD        December 20, 2022     Patient: Suzette Adams. YOB: 1965   Date of Visit: 12/20/2022       To Whom It May Concern: It is my medical opinion that Malu Cano can return to work with no restrictions of the left upper extremity until he has surgery approved for 12/27/2022 at which he will have a post op appointment 2 weeks after surgery for modified restrictions. If you have any questions or concerns, please don't hesitate to call.     Sincerely,        Shelbie Wilson MD

## 2022-12-20 NOTE — H&P (VIEW-ONLY)
Orthopaedic Hand Clinic Note    Name: Marilu Alcocer YOB: 1965  Gender: male  MRN: [id number]      CC: Patient referred for evaluation of upper extremity pain    HPI: Marilu Alcocer is a 56 y.o. male Right hand dominant with a chief complaint of left elbow pain, symptoms started 7 weeks ago when the patient was at work lifting something and he felt a pop in the left elbow, he has had a right sided distal bicep tear that was repaired 3 years ago and he did very well with that, he reports that this 1 although felt the same it did not bruise as much as the right side. Since then he has had pain in the left side that severely affects his everyday life as well as work duties. ROS/Meds/PSH/PMH/FH/SH: I personally reviewed the patients standard intake form. Pertinents are discussed in the HPI    Physical Examination:  General: Awake and alert. HEENT: Normocephalic, atraumatic  CV/Pulm: Breathing even and unlabored  Skin: No obvious rashes noted. Lymphatic: No obvious evidence of lymphedema or lymphadenopathy    Musculoskeletal Exam:  Examination on the left upper extremity demonstrates cap refill < 5 seconds in all fingers, normal sensation all fingers, full passive elbow motion, there is a equivocal hook test on the left side, discomfort with palpation of the bicep tendon area, weakness to supination and elbow flexion strength. Imaging / Electrodiagnostic Tests:     MRI of the left elbow was reviewed and independently interpreted, this demonstrates diffuse increased T2 signal within the bicipital tuberosity as well as along the biceps tendon sheath, there appears to be either a very high-grade partial-thickness tear or a complete tear of the distal biceps without retraction    Assessment:   1. Rupture of left distal biceps tendon, initial encounter        Plan:   We discussed the diagnosis and different treatment options.  We discussed observation, therapy, antiinflammatory medications and other pertinent treatment modalities. After discussing in detail the patient elects to proceed with left distal bicep tendon repair, we discussed that it has been 7 weeks since his injury, this makes repair more difficult, he understands there is a 10% chance of retear through life or 10% chance that the tendon does not heal, he understands possibility of PIN injury as well as lateral antebrachial cutaneous nerve injury during surgery. Patient understands risk and benefits of surgery and wished to proceed. Patient understands risks and benefits of LEFT DISTAL BICEP TENDON REPAIR including but not limited to nerve injury, vessel injury, infection, failure to achieve desired results and possible need for additional surgery. Patient understands and wishes to proceed with surgery. On Exam:   The patient is alert and oriented; ;   Lung auscultation is clear bilaterally   Heart has RRR without murmurs       Patient voiced accordance and understanding of the information provided and the formulated plan. All questions were answered to the patient's satisfaction during the encounter.     Brianne Tobin MD  Orthopaedic Surgery  12/20/22  8:40 AM

## 2022-12-21 ENCOUNTER — TELEPHONE (OUTPATIENT)
Dept: ORTHOPEDIC SURGERY | Age: 57
End: 2022-12-21

## 2022-12-21 NOTE — TELEPHONE ENCOUNTER
Called cait henning and was advised she has received surgical request and all office paperwork and is having it assigned to have surgery approved. She will call back with update.

## 2022-12-22 NOTE — TELEPHONE ENCOUNTER
I called pt and spoke with pt, pt states he spoke with rosario buchanan from [place] [address] and was advised pt dewayne of his surgery date 12/27/22 confirmation but did not advised him if the surgery was approved. I called and left a vm to dewayne at [phone] to fax or call with a approval as soon as possible due to pt surgery coming up soon.

## 2022-12-22 NOTE — TELEPHONE ENCOUNTER
TINYM to cait at [address] [place] [address] in regards to update on [facility] surgical request being approved. I have not received anything via fax or phone call since faxing everything on 12/20/2022.

## 2022-12-25 ENCOUNTER — ANESTHESIA EVENT (OUTPATIENT)
Dept: SURGERY | Age: 57
End: 2022-12-25
Payer: COMMERCIAL

## 2022-12-26 DIAGNOSIS — S46.212A RUPTURE OF LEFT DISTAL BICEPS TENDON, INITIAL ENCOUNTER: Primary | ICD-10-CM

## 2022-12-26 RX ORDER — HYDROCODONE BITARTRATE AND ACETAMINOPHEN 5; 325 MG/1; MG/1
1 TABLET ORAL EVERY 4 HOURS PRN
Qty: 28 TABLET | Refills: 0 | Status: SHIPPED | OUTPATIENT
Start: 2022-12-26 | End: 2022-12-31

## 2022-12-26 RX ORDER — ONDANSETRON 4 MG/1
4 TABLET, FILM COATED ORAL EVERY 8 HOURS PRN
Qty: 20 TABLET | Refills: 0 | Status: SHIPPED | OUTPATIENT
Start: 2022-12-26

## 2022-12-27 ENCOUNTER — ANESTHESIA (OUTPATIENT)
Dept: SURGERY | Age: 57
End: 2022-12-27
Payer: COMMERCIAL

## 2022-12-27 ENCOUNTER — HOSPITAL ENCOUNTER (OUTPATIENT)
Age: 57
Setting detail: OUTPATIENT SURGERY
Discharge: HOME | End: 2022-12-27
Attending: ORTHOPAEDIC SURGERY | Admitting: ORTHOPAEDIC SURGERY
Payer: COMMERCIAL

## 2022-12-27 VITALS
HEIGHT: 67 IN | WEIGHT: 218 LBS | BODY MASS INDEX: 34.21 KG/M2 | TEMPERATURE: 97.7 F | OXYGEN SATURATION: 93 % | SYSTOLIC BLOOD PRESSURE: 134 MMHG | DIASTOLIC BLOOD PRESSURE: 82 MMHG | RESPIRATION RATE: 16 BRPM | HEART RATE: 75 BPM

## 2022-12-27 DIAGNOSIS — S46.212A RUPTURE OF LEFT DISTAL BICEPS TENDON, INITIAL ENCOUNTER: Primary | ICD-10-CM

## 2022-12-27 PROCEDURE — 3600000004 HC SURGERY LEVEL 4 BASE: Performed by: ORTHOPAEDIC SURGERY

## 2022-12-27 PROCEDURE — 7100000011 HC PHASE II RECOVERY - ADDTL 15 MIN: Performed by: ORTHOPAEDIC SURGERY

## 2022-12-27 PROCEDURE — 7100000001 HC PACU RECOVERY - ADDTL 15 MIN: Performed by: ORTHOPAEDIC SURGERY

## 2022-12-27 PROCEDURE — 6360000002 HC RX W HCPCS: Performed by: NURSE PRACTITIONER

## 2022-12-27 PROCEDURE — C1776 JOINT DEVICE (IMPLANTABLE): HCPCS | Performed by: ORTHOPAEDIC SURGERY

## 2022-12-27 PROCEDURE — 7100000010 HC PHASE II RECOVERY - FIRST 15 MIN: Performed by: ORTHOPAEDIC SURGERY

## 2022-12-27 PROCEDURE — 6360000002 HC RX W HCPCS: Performed by: ANESTHESIOLOGY

## 2022-12-27 PROCEDURE — 2580000003 HC RX 258

## 2022-12-27 PROCEDURE — 3700000001 HC ADD 15 MINUTES (ANESTHESIA): Performed by: ORTHOPAEDIC SURGERY

## 2022-12-27 PROCEDURE — 6360000002 HC RX W HCPCS

## 2022-12-27 PROCEDURE — 2709999900 HC NON-CHARGEABLE SUPPLY: Performed by: ORTHOPAEDIC SURGERY

## 2022-12-27 PROCEDURE — 76942 ECHO GUIDE FOR BIOPSY: CPT | Performed by: ANESTHESIOLOGY

## 2022-12-27 PROCEDURE — 3600000014 HC SURGERY LEVEL 4 ADDTL 15MIN: Performed by: ORTHOPAEDIC SURGERY

## 2022-12-27 PROCEDURE — 2500000003 HC RX 250 WO HCPCS

## 2022-12-27 PROCEDURE — 2500000003 HC RX 250 WO HCPCS: Performed by: ANESTHESIOLOGY

## 2022-12-27 PROCEDURE — 3700000000 HC ANESTHESIA ATTENDED CARE: Performed by: ORTHOPAEDIC SURGERY

## 2022-12-27 PROCEDURE — 7100000000 HC PACU RECOVERY - FIRST 15 MIN: Performed by: ORTHOPAEDIC SURGERY

## 2022-12-27 DEVICE — KIT IMPL SYS PROX TENODESIS W/ BICEPSBUTTON INSRT FIBERLOOP: Type: IMPLANTABLE DEVICE | Site: ARM | Status: FUNCTIONAL

## 2022-12-27 RX ORDER — SODIUM CHLORIDE, SODIUM LACTATE, POTASSIUM CHLORIDE, CALCIUM CHLORIDE 600; 310; 30; 20 MG/100ML; MG/100ML; MG/100ML; MG/100ML
INJECTION, SOLUTION INTRAVENOUS CONTINUOUS PRN
Status: DISCONTINUED | OUTPATIENT
Start: 2022-12-27 | End: 2022-12-27 | Stop reason: SDUPTHER

## 2022-12-27 RX ORDER — SODIUM CHLORIDE 0.9 % (FLUSH) 0.9 %
5-40 SYRINGE (ML) INJECTION EVERY 12 HOURS SCHEDULED
Status: DISCONTINUED | OUTPATIENT
Start: 2022-12-27 | End: 2022-12-27 | Stop reason: HOSPADM

## 2022-12-27 RX ORDER — MIDAZOLAM HYDROCHLORIDE 2 MG/2ML
2 INJECTION, SOLUTION INTRAMUSCULAR; INTRAVENOUS
Status: COMPLETED | OUTPATIENT
Start: 2022-12-27 | End: 2022-12-27

## 2022-12-27 RX ORDER — PROCHLORPERAZINE EDISYLATE 5 MG/ML
5 INJECTION INTRAMUSCULAR; INTRAVENOUS
Status: DISCONTINUED | OUTPATIENT
Start: 2022-12-27 | End: 2022-12-27 | Stop reason: HOSPADM

## 2022-12-27 RX ORDER — HYDROMORPHONE HCL 110MG/55ML
0.5 PATIENT CONTROLLED ANALGESIA SYRINGE INTRAVENOUS EVERY 5 MIN PRN
Status: DISCONTINUED | OUTPATIENT
Start: 2022-12-27 | End: 2022-12-27 | Stop reason: HOSPADM

## 2022-12-27 RX ORDER — LIDOCAINE HYDROCHLORIDE 10 MG/ML
1 INJECTION, SOLUTION INFILTRATION; PERINEURAL
Status: DISCONTINUED | OUTPATIENT
Start: 2022-12-27 | End: 2022-12-27 | Stop reason: HOSPADM

## 2022-12-27 RX ORDER — PROPOFOL 10 MG/ML
INJECTION, EMULSION INTRAVENOUS CONTINUOUS PRN
Status: DISCONTINUED | OUTPATIENT
Start: 2022-12-27 | End: 2022-12-27 | Stop reason: SDUPTHER

## 2022-12-27 RX ORDER — PROPOFOL 10 MG/ML
INJECTION, EMULSION INTRAVENOUS PRN
Status: DISCONTINUED | OUTPATIENT
Start: 2022-12-27 | End: 2022-12-27 | Stop reason: SDUPTHER

## 2022-12-27 RX ORDER — SODIUM CHLORIDE 9 MG/ML
INJECTION, SOLUTION INTRAVENOUS PRN
Status: DISCONTINUED | OUTPATIENT
Start: 2022-12-27 | End: 2022-12-27 | Stop reason: HOSPADM

## 2022-12-27 RX ORDER — SODIUM CHLORIDE 0.9 % (FLUSH) 0.9 %
5-40 SYRINGE (ML) INJECTION PRN
Status: DISCONTINUED | OUTPATIENT
Start: 2022-12-27 | End: 2022-12-27 | Stop reason: HOSPADM

## 2022-12-27 RX ORDER — FENTANYL CITRATE 50 UG/ML
100 INJECTION, SOLUTION INTRAMUSCULAR; INTRAVENOUS
Status: COMPLETED | OUTPATIENT
Start: 2022-12-27 | End: 2022-12-27

## 2022-12-27 RX ORDER — LIDOCAINE HYDROCHLORIDE 20 MG/ML
INJECTION, SOLUTION EPIDURAL; INFILTRATION; INTRACAUDAL; PERINEURAL PRN
Status: DISCONTINUED | OUTPATIENT
Start: 2022-12-27 | End: 2022-12-27 | Stop reason: SDUPTHER

## 2022-12-27 RX ADMIN — ROPIVACAINE HYDROCHLORIDE 25 ML: 5 INJECTION, SOLUTION EPIDURAL; INFILTRATION; PERINEURAL at 07:38

## 2022-12-27 RX ADMIN — SODIUM CHLORIDE, SODIUM LACTATE, POTASSIUM CHLORIDE, AND CALCIUM CHLORIDE: 600; 310; 30; 20 INJECTION, SOLUTION INTRAVENOUS at 08:21

## 2022-12-27 RX ADMIN — PROPOFOL 10 MG: 10 INJECTION, EMULSION INTRAVENOUS at 08:25

## 2022-12-27 RX ADMIN — LIDOCAINE HYDROCHLORIDE 40 MG: 20 INJECTION, SOLUTION EPIDURAL; INFILTRATION; INTRACAUDAL; PERINEURAL at 08:23

## 2022-12-27 RX ADMIN — DEXAMETHASONE SODIUM PHOSPHATE 4 MG: 4 INJECTION, SOLUTION INTRAMUSCULAR; INTRAVENOUS at 07:38

## 2022-12-27 RX ADMIN — PROPOFOL 120 MCG/KG/MIN: 10 INJECTION, EMULSION INTRAVENOUS at 08:23

## 2022-12-27 RX ADMIN — MIDAZOLAM HYDROCHLORIDE 2 MG: 1 INJECTION, SOLUTION INTRAMUSCULAR; INTRAVENOUS at 07:38

## 2022-12-27 RX ADMIN — MEPIVACAINE HYDROCHLORIDE 10 ML: 15 INJECTION, SOLUTION EPIDURAL; INFILTRATION at 07:38

## 2022-12-27 RX ADMIN — FENTANYL CITRATE 100 MCG: 50 INJECTION, SOLUTION INTRAMUSCULAR; INTRAVENOUS at 07:38

## 2022-12-27 RX ADMIN — PROPOFOL 50 MG: 10 INJECTION, EMULSION INTRAVENOUS at 08:23

## 2022-12-27 RX ADMIN — Medication 2000 MG: at 08:26

## 2022-12-27 NOTE — ANESTHESIA PRE PROCEDURE
Department of Anesthesiology  Preprocedure Note       Name:  Nasra Schmitz. Age:  56 y.o.  :  1965                                          MRN:  [id number]         Date:  2022      Surgeon: Fredi Eaton):  Nette Muller MD    Procedure: Procedure(s):  LEFT DISTAL BICEPS TENDON REPAIR    Medications prior to admission:   Prior to Admission medications    Medication Sig Start Date End Date Taking? Authorizing Provider   HYDROcodone-acetaminophen (NORCO) 5-325 MG per tablet Take 1 tablet by mouth every 4 hours as needed for Pain for up to 5 days. Intended supply: 7 days. Take lowest dose possible to manage pain Max Daily Amount: 6 tablets 22  SHYAM Gann - CNP   ondansetron (ZOFRAN) 4 MG tablet Take 1 tablet by mouth every 8 hours as needed for Nausea or Vomiting 22   SHYAM Gann - CNP   loratadine (CLARITIN) 10 MG tablet Take 10 mg by mouth daily    Ar Automatic Reconciliation       Current medications:    No current facility-administered medications for this encounter. Allergies: Allergies   Allergen Reactions    Other Nausea And Vomiting     DARVOCET       Problem List:    Patient Active Problem List   Diagnosis Code    Sigmoid diverticulitis K57.32    Mild protein-calorie malnutrition (HCC) E44.1    Abscess of sigmoid colon due to diverticulitis K57.20    PAULO on CPAP G47.33, Z99.89    Allergic rhinitis J30.9    Hyperlipemia E78.5    LLQ pain R10.32    FH: prostate cancer Z80.42    Transient disorder of initiating or maintaining sleep F51.02    Headache R51.9    Obesity (BMI 30.0-34. 9) E66.9    HA (headache) R51.9    Rupture of left distal biceps tendon J00.309W       Past Medical History:        Diagnosis Date    Diverticulosis     H/O seasonal allergies     Unspecified sleep apnea     wears cpap       Past Surgical History:        Procedure Laterality Date    COLONOSCOPY N/A 2017    COLONOSCOPY  BMI 31 performed by Fan Villegas MD at [address] FLX DX W/COLLJ [place]ov[place] 1978 PFRMD  12/13/2017         HERNIA REPAIR      ORTHOPEDIC SURGERY Right 2009    rolled bicep    NH ABDOMEN SURGERY PROC UNLISTED  12/2021    colon resection       Social History:    Social History     Tobacco Use    Smoking status: Never    Smokeless tobacco: Never   Substance Use Topics    Alcohol use: No                                Counseling given: Not Answered      Vital Signs (Current):   Vitals:    12/20/22 1424   Weight: 218 lb (98.9 kg)   Height: 5' 7\" (1.702 m)                                              BP Readings from Last 3 Encounters:   05/18/22 122/86   11/18/21 132/80   11/16/21 (!) 120/90       NPO Status:                                                                                 BMI:   Wt Readings from Last 3 Encounters:   12/20/22 218 lb (98.9 kg)   12/20/22 218 lb (98.9 kg)   12/06/22 218 lb (98.9 kg)     Body mass index is 34.14 kg/m².     CBC:   Lab Results   Component Value Date/Time    WBC 6.3 11/28/2022 08:36 AM    RBC 4.81 11/28/2022 08:36 AM    HGB 14.8 11/28/2022 08:36 AM    HCT 44.5 11/28/2022 08:36 AM    MCV 92.5 11/28/2022 08:36 AM    RDW 12.3 11/28/2022 08:36 AM     11/28/2022 08:36 AM       CMP:   Lab Results   Component Value Date/Time     11/28/2022 08:36 AM    K 3.9 11/28/2022 08:36 AM     11/28/2022 08:36 AM    CO2 25 11/28/2022 08:36 AM    BUN 17 11/28/2022 08:36 AM    CREATININE 1.00 11/28/2022 08:36 AM    GFRAA >60 12/07/2021 05:54 AM    AGRATIO 0.8 11/29/2021 01:40 PM    LABGLOM >60 11/28/2022 08:36 AM    GLUCOSE 101 11/28/2022 08:36 AM    PROT 7.7 11/28/2022 08:36 AM    CALCIUM 9.2 11/28/2022 08:36 AM    BILITOT 0.5 11/28/2022 08:36 AM    ALKPHOS 71 11/28/2022 08:36 AM    ALKPHOS 61 11/29/2021 01:40 PM    AST 28 11/28/2022 08:36 AM    ALT 56 11/28/2022 08:36 AM       POC Tests: No results for input(s): POCGLU, POCNA, POCK, POCCL, POCBUN, POCHEMO, POCHCT in the last

## 2022-12-27 NOTE — INTERVAL H&P NOTE
H&P Update:  Gary Arnold. was seen and examined. History and physical has been reviewed. The patient has been examined.  There have been no significant clinical changes since the completion of the originally dated History and Physical.    Dangelo Mora MD  Orthopaedic Surgery  12/27/22  7:04 AM

## 2022-12-27 NOTE — DISCHARGE INSTRUCTIONS
Postoperative  Instructions:      Weightbearing or Lifting: You  are  not  allowed  to  lift  any  weight  or  bear  any  weight  on  the  surgical  extremity  until  cleared  by  your  surgeon. Dressing  instructions:    Keep  your  dressing  and/or  splint  clean  and  dry  at  all  times. It  will  be  removed  at  your  first  post-operative  appointment or therapy apppointment. Your  stitches  will  be  removed  at  this  visit. Showering  Instructions:  May  shower  But keep surgical dressing clean and dry until removed as explained above. Pain  Control:  - You  have  been  given  a  prescription  to  be  taken  as  directed  for  post-operative  pain  control. In  addition,  elevate  the  operative  extremity  above  the  heart  at  all  times  to  prevent  swelling  and  throbbing  pain. - If you develop constipation while taking narcotic pain medications (Norco, Hydrocodone, Percocet, Oxycodone, Dilaudid, Hydromorphone) take  over-the-counter  Colace,  100mg  by  mouth  twice  a  Day. - Nausea  is  a  common  side  effect  of  many  pain  medications. You  will  want  to  eat something  before  taking  your  pain  medicine  to  help  prevent  Nausea. - If  you  are  taking  a  prescription  pain  medication  that  contains  acetaminophen,  we  recommend  that  you  do  not  take  additional  over  the  counter  acetaminophen  (Tylenol®). Other  pain  relieving  options:   - Using  a  cold  pack  to  ice  the  affected  area  a  few  times  a  day  (15  to  20  minutes  at  a  time)  can  help  to  relieve  pain,  reduce  swelling  and  bruising.      - Elevation  of  the  affected  area  can  also  help  to  reduce  pain  and  swelling. Did  you  receive  a  nerve  Block? A  nerve  block  can  provide  pain  relief  for  one  hour  to  two  days  after  your  surgery.   As  long  as  the  nerve  block  is  working,  you  will  experience  little  or  no  sensation in  the  area  the  surgeon  operated  on. As  the  nerve  block  wears  off,  you  will  begin  to  experience  pain  or  discomfort. It  is  very  important  that  you  begin  taking  your  prescribed  pain  medication  before  the  nerve  block  fully  wears  off. The first sign that the nerve block is wearing off is tingling in your fingers. Treating  your  pain  at  the  first  sign  of  the  block  wearing  off  will  ensure  your  pain  is  better  controlled  and  more  tolerable  when  full-sensation  returns. Do  not  wait  until  the  pain  is  intolerable,  as  the  medicine  will  be  less  effective. It  is  better  to  treat  pain  in  advance  than  to  try  and  catch  up. General  Anesthesia or Sedation:      If  you  did  not  receive  a  nerve  block  during  your  surgery,  you  will  need  to  start  taking  your  pain  medication  shortly  after  your  surgery  and  should  continue  to  do  so  as  prescribed  by  your  surgeon. Please contact us through my chart or call  [phone]  with any concern and ask to speak with Dr Parada [place] team.    Concerning problems include:      -  Excessive  redness  of  the  incisions      -  Drainage  for  more  than  2  Days after surgery or any foul smelling drainage  -  Fever  of  more  than  101.5  F      Please  call  [phone]  if  you  do  not  receive  or  are  unsure  of  your  first  follow-up  appointment. You  should  see  the  doctor  10-14  days  after  your  Surgery. Thank you for choosing me and [address] [place] [address] for your care. I will go above and beyond to ensure you receive the best care possible.     Aly Madsen MD, PhD    After general anesthesia or intravenous sedation, for 24 hours or while taking prescription Narcotics:  Limit your activities  A responsible adult needs to be with you for the next 24 hours  Do not drive and operate hazardous machinery  Do not make important personal or business decisions  Do not drink alcoholic beverages  If you have not urinated within 8 hours after discharge, and you are experiencing discomfort from urinary retention, please go to the nearest ED. If you have sleep apnea and have a CPAP machine, please use it for all naps and sleeping. Please use caution when taking narcotics and any of your home medications that may cause drowsiness. *  Please give a list of your current medications to your Primary Care Provider. *  Please update this list whenever your medications are discontinued, doses are      changed, or new medications (including over-the-counter products) are added. *  Please carry medication information at all times in case of emergency situations. These are general instructions for a healthy lifestyle:  No smoking/ No tobacco products/ Avoid exposure to second hand smoke  Surgeon General's Warning:  Quitting smoking now greatly reduces serious risk to your health. Obesity, smoking, and sedentary lifestyle greatly increases your risk for illness  A healthy diet, regular physical exercise & weight monitoring are important for maintaining a healthy lifestyle    You may be retaining fluid if you have a history of heart failure or if you experience any of the following symptoms:  Weight gain of 3 pounds or more overnight or 5 pounds in a week, increased swelling in our hands or feet or shortness of breath while lying flat in bed. Please call your doctor as soon as you notice any of these symptoms; do not wait until your next office visit.

## 2022-12-27 NOTE — OP NOTE
Hand Surgery Operative Note      Hollis Paul.   56 y.o.   male   12/27/2022      Pre-op diagnosis: Left Distal Biceps Tendon Tear  Post op diagnosis: same      Procedure: Left Distal Biceps Tendon Repair (16659)      Surgeon: Pancho Long MD, PhD      Anesthesia: Plexus block      Tourniquet time:   Total Tourniquet Time Documented:  Arm  (Left) - 26 minutes  Total: Arm  (Left) - 26 minutes        Procedure indications: Patient with acute onset elbow pain and weakness, MRI confirmed distal biceps tendon tear. After Thorough discussion, the patient decided to proceed with surgical management. We discussed in detail surgical risks including scar, pain, bleeding, infection, anesthetic risks, neurovascular injury, need for further surgery,  weakness, stiffness, risk of death and potential risk of other unforseen complication. Procedure description:      Patient was placed in the supine position and after appropriate time-out and side, site and procedure confirmed. The proximal part of a Volar Vernel November was used, A longitudinal incision was made 3 cm distal to the elbow crease just medial to the mobile wad, blunt dissection was carried down, the interval between the flexor-pronators and the mobile was muscles identified and bluntly dissected, the lateral antebrachial cutaneous nerve was identified and retracted carefully to prevent injury, subcutaneous dissection was carried down with the finger proximal until the biceps tendon stump was identified and retracted into the wound, this was tagged with 6 passes of a fiber loop stitch, the dissection deep into the bicipital tuberosity was then continued, large arterial anastomosis were identified and ligated with vessel clips.   The arm was kept supinated throughout the procedure to prevent injury to the PIN, the bicipital tuberosity was identified and debrided, a 4.5 mm drill was inserted from ulnar to radial in unicortical fashion, the sutures from the whipstitch were then passed through an Arthrex Endobutton, the Endobutton was inserted into the medullary canal and the sutures were pulled taut, the tendon was seated well into the radius and the sutures were tied in sliding fashion with a total of 8 knots, this was done with the elbow flexed. At the end of the procedure there was good tension on the tendon. Wound was irrigated, tourniquet released and hemostasis was obtained with bipolar cautery. Wound then closed with 4-0 vicryl and running 4-0 stratafix and steristrips, sterile dressing applied followed by a plaster long arm splint in 90 degrees of elbow flexion and neutral prono-supination. Disposition: To PACU with no complications and follow up per routine. Patient is instructed to keep dressing on until therapy appointment in 2 weeks, precautions include avoidance of lifting for 8 weeks.      Melony Samaniego MD  12/27/22  9:24 AM

## 2022-12-27 NOTE — ANESTHESIA PROCEDURE NOTES
Peripheral Block    Patient location during procedure: pre-op  Reason for block: post-op pain management and at surgeon's request  Start time: 12/27/2022 7:38 AM  End time: 12/27/2022 7:45 AM  Staffing  Performed: anesthesiologist   Anesthesiologist: Vernell Ortiz MD  Preanesthetic Checklist  Completed: patient identified, site marked, risks and benefits discussed, equipment checked, pre-op evaluation, timeout performed, anesthesia consent given, oxygen available and monitors applied/VS acknowledged  Peripheral Block   Prep: ChloraPrep  Provider prep: mask and sterile gloves  Patient monitoring: cardiac monitor, continuous pulse ox, oxygen, IV access, frequent blood pressure checks and responsive to questions  Block type: Brachial plexus  Supraclavicular  Laterality: left  Injection technique: single-shot  Guidance: nerve stimulator and ultrasound guided    Needle   Needle type: insulated echogenic nerve stimulator needle   Needle gauge: 20 G  Needle localization: nerve stimulator and ultrasound guidance (minimal motor response at >0.4 mA)  Needle length: 10 cm  Assessment   Injection assessment: negative aspiration for heme, no paresthesia on injection, local visualized surrounding nerve on ultrasound and no intravascular symptoms  Slow fractionated injection: yes  Hemodynamics: stable  Real-time US image taken/store: yes  Outcomes: patient tolerated procedure well and uncomplicated    Additional Notes  Risks/benefits/alternatives discussed including damage to nerve or muscle. 3 cc 1% lidocaine local injected at needle insertion site. Needle inserted and placed in close proximity to the nerve under real time ultrasound guidance. Ultrasound was used to visualize the spread of local anesthetic in close proximity to the nerve being blocked. The nerve appeared anatomically normal and there were no abnormal findings. Ultrasound imaged printed and placed on chart.       Medications Administered  EPINEPHrine PF 1 MG/ML Soln, ropivacaine 0.5% Soln (Mixture components: EPINEPHrine PF 1 MG/ML Soln, 0.005 mL; ropivacaine 0.5% Soln, 1 mL) - Perineural   25 mL - 12/27/2022 7:38:00 AM  mepivacaine 1.5 % - Perineural   10 mL - 12/27/2022 7:38:00 AM  dexamethasone 4 MG/ML - Perineural   4 mg - 12/27/2022 7:38:00 AM

## 2022-12-27 NOTE — ANESTHESIA POSTPROCEDURE EVALUATION
Department of Anesthesiology  Postprocedure Note    Patient: Cary Maza   MRN: [id number]  YOB: 1965  Date of evaluation: 12/27/2022      Procedure Summary     Date: 12/27/22 Room / Location: [facility] OP OR 05 / [facility]    Anesthesia Start: 9340 Anesthesia Stop: 8449    Procedure: LEFT DISTAL BICEPS TENDON REPAIR (Left: Arm Lower) Diagnosis:       Rupture of distal biceps tendon, left, initial encounter      (Rupture of distal biceps tendon, left, initial encounter [K76.496B])    Surgeons: Susana Vogel MD Responsible Provider: Sony Valero MD    Anesthesia Type: TIVA, MAC ASA Status: 2          Anesthesia Type: TIVA, MAC    Rodney Phase I: Rodney Score: 7    Rodney Phase II:        Anesthesia Post Evaluation    Patient location during evaluation: PACU  Patient participation: complete - patient participated  Level of consciousness: awake and alert  Airway patency: patent  Nausea & Vomiting: no nausea and no vomiting  Complications: no  Cardiovascular status: hemodynamically stable  Respiratory status: acceptable, nonlabored ventilation and spontaneous ventilation  Hydration status: euvolemic  Comments: /66   Pulse 88   Temp 97.7 °F (36.5 °C) (Temporal)   Resp 14   Ht 5' 7\" (1.702 m)   Wt 218 lb (98.9 kg)   SpO2 95%   BMI 34.14 kg/m²     Multimodal analgesia pain management approach

## 2023-01-03 DIAGNOSIS — S46.212A RUPTURE OF LEFT DISTAL BICEPS TENDON, INITIAL ENCOUNTER: Primary | ICD-10-CM

## 2023-01-03 DIAGNOSIS — R21 RASH: ICD-10-CM

## 2023-01-03 RX ORDER — METHYLPREDNISOLONE 4 MG/1
TABLET ORAL
Qty: 1 KIT | Refills: 0 | Status: SHIPPED | OUTPATIENT
Start: 2023-01-03

## 2023-01-04 ENCOUNTER — TELEPHONE (OUTPATIENT)
Dept: ORTHOPEDIC SURGERY | Age: 58
End: 2023-01-04

## 2023-01-04 DIAGNOSIS — S46.212A RUPTURE OF LEFT DISTAL BICEPS TENDON, INITIAL ENCOUNTER: Primary | ICD-10-CM

## 2023-01-04 NOTE — TELEPHONE ENCOUNTER
[facility] Nurse mgr Yovani Kent phone #  [phone] & her email  [email or web address]    She wants a better work note,since he was  Allowed to work with no restrictions until he has surgery on 12/27,when he comes in  In 2 weeks after surgery for modified restrictions

## 2023-01-10 ENCOUNTER — OFFICE VISIT (OUTPATIENT)
Dept: ORTHOPEDIC SURGERY | Age: 58
End: 2023-01-10

## 2023-01-10 DIAGNOSIS — S46.212A RUPTURE OF LEFT DISTAL BICEPS TENDON, INITIAL ENCOUNTER: Primary | ICD-10-CM

## 2023-01-10 DIAGNOSIS — M25.622 STIFFNESS OF LEFT ELBOW JOINT: ICD-10-CM

## 2023-01-10 DIAGNOSIS — M25.522 LEFT ELBOW PAIN: ICD-10-CM

## 2023-01-10 NOTE — LETTER
DME Patient Authorization Form    Name: Adam Quevedo : 1965  MRN: [id number]   Age: 56 y.o. Gender: male  Delivery Address: [facility]     Diagnosis: No diagnosis found. Requested DME:  Kerry Nephew ($525.00) X 1 - left        Clinical Notes:     **Indicates non-covered items by insurance. Payment expected on date of service. Electronically signed by  Provider: SHYAM Calderon CNP__Date: 1/10/2023                            [address] Tax ID # [id number]        Durable Medical Equipment and/or Orthotics Patient Consent     I understand that my physician has prescribed this medical supply as part of my treatment plan as a matter of Medical Necessity.  I understand that I have a choice in where I receive my prescribed orthopedic supplies and/or services.  I authorize [facility] to furnish this service/product and to provide my insurance carrier with any information requested in order to process for payment.  I instruct my insurance carrier to pay [facility] directly for these services/products.  I understand that my insurance carrier may deny payment for this supply because it is a non-covered item, deemed not medically necessary or considered experimental.   I understand that any cost not covered by my insurance carrier will be solely my financial responsibility.  I have received the Supplier Standards and have reviewed them.  I have received the prescribed item and have been fully instructed on the proper use of the above services/products.    ______ (Patient Initials) I understand that all DME items are non-returnable after being dispensed. Items still in sealed packaging may be returned up to 14 days after purchasing.  [address] will replace items that are defective.    ______ (Patient Initials) I understand that [facility] will not file a claim with my insurance carrier for this service/product and I am waiving my right to file a claim on my own for this service/product with my insurance company as this item is NON-COVERED (Denoted by the **) by my Insurance company/policy. ______ (Patient Initials) I understand that I am responsible to bring my equipment to the hospital for any surgery. ______________________________________________  ________________________  Patient / Ila Star            Thank you for considering [address] Your physician has prescribed specific medical equipment or devices for your home use. The following describes your rights and responsibilities as our customer. Right to Choose Providers: You have a choice regarding which company supplies your home medical equipment and devices, and to consult your physician in this decision. You may choose a medical supply store, a home medical equipment provider, or a specialist such as [facility]. [facility] will coordinate with your physician to provide the medical equipment or devices prescribed for your home use. Right to Service:  You have the right to considerate, respectful and nondiscriminatory care. You have the right to receive accurate and easily understood information about your health care. If you speak a foreign language, or don't understand the discussions, assistance will be provided to allow you to make informed health care decisions. You have the right to know your treatment options and to participate in decisions about your care, including the right to accept or refuse treatment. You have the right to expect a reasonable response to your requests for treatment or service.   You have the right to talk in confidence with health care providers and to have your health care information protected. You have the right to receive an explanation of your bill. You have the right to complain about the service or product you receive. Patient Responsibilities:  Please provide complete and accurate information about your health insurance benefits and make arrangements for the timely payment of your bill. [facility] will, if possible, assume responsibility for billing your insurance (Medicare, Medicaid and commercial) for the prescribed equipment or devices. If your policy does not cover the prescribed product, or only covers a portion of the bill, you are responsible for any remaining balance. Return and Exchange Policy:  [facility] will honor published  Warranties for products. [facility] will accept returns or exchanges within 14 days from the date of receipt, providin) the product must be in new condition; 2) receipt as required; and 3) used disposable and hygiene products may only be returned due to a defective product. Note: Refunds will be issued in a timely manner, please allow 4-6 weeks for processing. Complaint Procedures and DME Consumer Protection Resources:  [facility] values you as a customer, and is committed to resolving patient concerns. This commitment includes understanding and documenting your concerns, conducting a review of internal procedures, and providing you with an explanation and resolution to your concerns. Should you have any questions about our services or billing process, please contact our office at (practice phone number). If we are unable to resolve the concern, you have the right to direct comments to the office of Consumer Protection, in the [address] or the [facility] office, without fear of repercussion. DMEPOS SUPPLIER STANDARDS    A supplier must be in compliance with all applicable Federal and [employer] and regulatory requirements.   A supplier must provide complete and accurate information on the DMEPOS supplier application. Any changes to this information must be reported to the [facility] within 30 days. An authorized individual (one whose signature is binding) must sign the application for billing privileges. A supplier must fill orders from its own inventory, or must contract with other companies for the purchase of items necessary to fill the order. A supplier may not contract with any entity that is currently excluded from the Medicare program, any [facility] program, or from any other Federal procurement or Nonprocurement programs. A supplier must advise beneficiaries that they may rent or purchase inexpensive or routinely purchased durable medical equipment, and of the purchase option for capped rental equipment. A supplier must notify beneficiaries of warranty coverage and honor all warranties under applicable State Law, and repair or replace free of charge Medicare covered items that are under warranty. A supplier must maintain a physical facility on an appropriate site. A supplier must permit CMS, or its agents to conduct on-site inspections to ascertain the supplier's compliance with these standards. The supplier location must be accessible to beneficiaries during reasonable business hours, and must maintain a visible sign and posted hours of operation. A supplier must maintain a primary business telephone listed under the name of the business in a [employer] or a toll free number available through directory assistance. The exclusive use of a beeper, answering machine or cell phone is prohibited. A supplier must have comprehensive liability insurance in the amount of at least $300,000 that covers both the supplier's place of business and all customers and employees of the supplier. If the supplier manufactures its own items, this insurance must also cover product liability and completed operations.   A supplier must agree not to initiate telephone contact with beneficiaries, with a few exceptions allowed. This standard prohibits suppliers from calling beneficiaries in order to solicit new business. A supplier is responsible for delivery and must instruct beneficiaries on use of Medicare covered items, and maintain proof of delivery. A supplier must answer questions, and respond to complaints of the beneficiaries, and maintain documentation of such contacts. A supplier must maintain and replace at no charge or repair directly, or through a service contract with another company, Medicare covered items it has rented to beneficiaries. A supplier must accept returns of substandard (less than full quality for the particular item) or unsuitable items (inappropriate for the beneficiary at the time it was fitted and rented or sold) from beneficiaries. A supplier must disclose these supplier standards to each beneficiary to whom it supplies a Medicare-covered item. A supplier must disclose to the government any person having ownership, financial, or control interest in the supplier. A supplier must not convey or reassign a supplier number; i.e., the supplier may not sell or allow another entity to use its Medicare billing number. A supplier must have a complaint resolution protocol established to address beneficiary complaints that relate to these standards. A record of these complaints must be maintained at the physical facility. Complaint records must include: the name, address, telephone number and health insurance claim number of the beneficiary, a summary of the complaint, and any action taken to resolve it. A supplier must agree to furnish CMS any information required by the Medicare statute and implementing regulations. A supplier of DMEPOS and other items and services must be accredited by a CMS-approved accreditation organization in order to receive and retain a supplier billing number.  The accreditation must indicate the specific products and services, for which the supplier is accredited in order for the supplier to receive payment for those specific products and services. A DMEPOS supplier must notify their accreditation organization when a new DMEPOS location is opened. The accreditation organization may accredit the new supplier location for three months after it is operational without requiring a new site visit. All DMEPOS supplier locations, whether owned or subcontracted, must meet the [employer] and be separately accredited in order to bill Medicare. An accredited supplier may be denied enrollment or their enrollment may be revoked, if CMS determines that they are not in compliance with the DMEPOS quality standards. A DMEPOS supplier must disclose upon enrollment all products and services, including the addition of new product lines for which they are seeking accreditation. If a new product line is added after enrollment, the DMEPOS supplier will be responsible for notifying the accrediting body of the new product so that the DMEPOS supplier can be re-surveyed and accredited for these new products. Must meet the surety bond requirements specified in 42 C. F.R. 424.57(c). Implementation date- May 4, 2009. A supplier must obtain oxygen from a state-licensed oxygen supplier. A supplier must maintain ordering and referring documentation consistent with provisions found in 42 C. F.R. 424.516(f). DMEPOS suppliers are prohibited from sharing a practice location with certain other Medicare providers and suppliers. DMEPOS suppliers must remain open to the public for a minimum of 30 hours per week with certain exceptions.

## 2023-01-10 NOTE — PROGRESS NOTES
Orthopaedic Hand Surgery Note    Name: Eleazar Avery Jr.  YOB: 1965  Gender: male  MRN: [id number]    Post Operative Visit: Left Distal Biceps Tendon Repair - Left    HPI: Patient is status post Left Distal Biceps Tendon Repair - Left on 12/27/2022.  Patient is pleased with current progress. Denies fevers or chills.  Patient has not had to take any narcotic pain medication in the last few days.  He is doing well.    Physical Examination:  Wound healing well.  There is no erythema or drainage.  Sensation is intact in all fingers. Motor exam reveals no deficits. Good finger and wrist range of motion.    Imaging:     None    Assessment:   1. Rupture of left distal biceps tendon, initial encounter         Status post Left Distal Biceps Tendon Repair - Left on 12/27/2022    Plan:  We discussed the post operative course and progression.  Patient will see therapy today.  He will be fitted for hinged brace.  He will start his rehab program following her distal biceps tendon protocol.  He denies needing medication.  We will give him a work note with the restrictions of no lifting, pushing, pulling with the left upper extremity and no climbing ladders.  We will see him back in 4 weeks to reassess his progress.    SHYAM Hagan - CNP  01/10/23  9:24 AM

## 2023-01-10 NOTE — PROGRESS NOTES
[address]  [address] [facility]  Dept: [phone]      Occupational Therapy Initial Assessment     Referring MD: Keli Hill MD    Diagnosis:     ICD-10-CM    1. Rupture of left distal biceps tendon, initial encounter  S46.212A       2. Left elbow pain  M25.522       3. Stiffness of left elbow joint  M25.622            Surgery: Date 12/27/22 : distal biceps repair    Therapy precautions: Tendon precautions    History of injury/onset : Was installing a honeycomb component in the back of a freezer unit when he felt a pop in the elbow area. Has had the right distal biceps repaired in the past.    Total Direct Treatment Time: 30 min                       Total In Office Time: 40 min    Preferred Name:  Shawn Quinn [address]:   Past Medical History:   Diagnosis Date    Diverticulosis     H/O seasonal allergies     Unspecified sleep apnea     wears cpap   ,   Past Surgical History:   Procedure Laterality Date    BICEPS TENDON REPAIR Left 12/27/2022    LEFT DISTAL BICEPS TENDON REPAIR performed by Lorre Brittle, MD at [facility]    COLONOSCOPY N/A 12/13/2017    COLONOSCOPY  BMI 31 performed by Doroteo Alarcon MD at [address] ([place] [address]) FLX DX W/COLLJ Jeovanny 1978 PFRMD  12/13/2017         HERNIA REPAIR      ORTHOPEDIC SURGERY Right 2009    rolled bicep    CT UNLISTED PROCEDURE ABDOMEN PERITONEUM & OMENTUM  12/2021    colon resection      Medications. : Reviewed in chart  Allergies:    Allergies   Allergen Reactions    Other Nausea And Vomiting     DARVOCET        SUBJECTIVE     Current Symptoms/Chief complaints:   Chief Complaint   Patient presents with    Elbow Pain       Chief complaint/history of injury:   Date symptoms began: 12/13/22  Otoniel Renteria of condition:Recent onset (initial onset within last 3 months)  Primary cause of current episode: Post-surgical  How did symptoms start: installing a honeycomb at work  Describe current symptoms: L elbow stiffness, soreness  Received previous outpatient therapy? No    Pain Assessment:  Pain location: L elbow  Average Pain/symptom intensity (0-10 scale)  Last 24 hours: 0-1/10  Last week (1-7 days): _1-4/10  How often do you feel symptoms? 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    Neuro screen: Pt denies c/o and numbness    Patient Stated Goals: \"To get back to work\"    OBJECTIVE     Functional Outcome Measures: Quick Dash  43 score=   72.72 % functional deficit  Hand/Side Dominance: left handed  Observation/Posture: Holding UE in protected position  Palpation: Tender surgical site  Swelling/Edema: moderate L elbow  Skin Integrity: incision well healed     A/PROM Measures of Affected Extremity    DATE 1/10/23      AROM   elbow ext    45°       AROM   elbow flex 120°       AROM   supination 80°       AROM   pronation 80°       PROM  elbow flex FULL         Strength/MMT (0-5 Scale) : Not tested secondary to precautions  Evaluation Modifications/Assistance required : None  Degree of assistance provided: none    TREATMENT    Initial Evaluation: Low Complexity (95073)      Therapeutic exercise (30227) x 15 min:  Home Exercise Program development and Education: see below. Patient I with program following instruction and performance  Use of heat and ice as needed for pain and inflammation reduction. Precautions reviewed.   OT POC and rationale, education for surgical precautions and pain management, edema management    Orthotics Management and Training Initial Encounter (19069) x 15 min:  Management and training including assessment and fitting of the upper extremity, initial encounter  The patient was educated on importance of compliance with hinged brace. The hinged elbow brace was set with extension block at 45 and flexion to full    Access Code: [email or web address]  URL: [email or web address]  Date: 01/10/2023  Prepared by: Clement Mccollum    Exercises  Isometric Tricep Extension - 2-3 x daily - 7 x weekly - 10 reps - 10 seconds hold  Seated Isometric Elbow Flexion - 2-3 x daily - 7 x weekly - 10 reps - 10 seconds hold  Forearm Supination PROM - 2-3 x daily - 7 x weekly - 5 reps - 30 seconds hold  Isometric Shoulder Flexion at Wall - 2 x daily - 7 x weekly - 1-2 sets - 10 reps - 10 sec hold  Standing Isometric Shoulder Internal Rotation at Doorway - 2 x daily - 7 x weekly - 1-2 sets - 10 reps - 10 sec hold  Isometric Shoulder External Rotation at Wall - 2 x daily - 7 x weekly - 1-2 sets - 10 reps - 10 sec hold  Isometric Shoulder Extension at Wall - 2 x daily - 7 x weekly - 1-2 sets - 10 reps - 10 sec hold      CLINICAL DECISION MAKING/ASSESSMENT     Performance Deficits  Physical: Mobility and Strength  Cognitive: No deficiencies noted  Psychosocial: No deficiencies noted  Rehab potential: excellent    The patient presents s/p L distal biceps repair, 2 weeks ago  on 12/27/22. The patient presents with ROM limited due to post-operative precautions in order to protect repaired tendon. The incremental progression of ROM places the patient at risk for loss of elbow ROM which could impact full functional use of extremity. However with skilled OT/hand therapy, the patient will be progressed to ensure loss of motion is prevented and maximal function is reached for  self care, work and leisure tasks. These deficits appear to be secondary to Bicep rupture/repair .   Based on these subjective and objective findings, the patient is perceived as currently having a primary functional deficit of  73% dysfunction. After a brief chart/PMH and OT profile review, evaluation requiring minimal  assistance/modifications and simple patient and data analysis, I have determined the patient exhibits several (1-3) performance deficits. Comorbidities do not affect the patient's occupational performance. The following performance deficits (including but not limited to physical, cognitive and/or psychosocial components) result in activity limitations and participation restrictions: Mobility and Strength    The patient would benefit from skilled occupational therapy services to address the deficits noted above for return to prior level of function. PLAN OF CARE     Effective Dates: 1/10/2023 TO 3/11/2023 (60 days). Frequency/Duration:  1-2 x a week  for 60 Day(s)  Interventions may include but are not limited to: (18247) Therapeutic exercise to develop strength and endurance, range of motion, and flexibility, (71421) Manual Therapy:   Consisting of but not limited to hands on treatment by therapist for connective tissue massage, pain management, edema management, joint mobilization and manipulation, manual traction, passive range of motion, soft tissue and neural system mobilization/manipulation and therapeutic massage., (57176 Initial orthotic management and training: Fabrication/adjustments as indicated, (20373) Subsequent orthotic management as indicated, Modalities prn to address pain, spasms, and swelling: (41912) Hot/cold pack  (62231) Fluidotherapy, and Home exercise program (HEP) development    GOALS     Short term goals:  1/31/2023  (3 weeks)  Patient will be I with HEP for ROM per protocol. The patient will be independent with meir/doffing elbow hinge brace. The patient will be independent with understanding the ROM setting of the hinged elbow brace by the end of one session.   The patient will be independent with post-operative precautions in one session. Increase A/AROM of affected elbow flexion to at least 140 ° to improve function with feeding  Increase AROM of affected elbow extension incrementally as per protocol to improve function with reaching and donning LE dressing. Improve Quick DASH functional assessment score from 73% deficit to less than 50% deficit. Long term goals:  3/7/2023  (8 weeks)  Pt will be able to use affected UE for all ADL's without difficulty 100% of the time. Decrease edema affected elbow to minimal.  Pt will be able to sleep through the night with no pain in affected UE. Pt will have full active elbow flexion of affected extremity to improve function with tasks like feeding and lifting. Pt will have full active elbow extension of affected extremity to improve function with tasks that require reach into cabinets and across table. Pt will have full active forearm pronation of  affected extremity to improve ability with tasks like writing and placing/acquiring items on tabletop. Pt will have full active forearm supination of affected extremity to improve ability with tasks like opening doors, hold a plate. Pt will be able to lift and carry items (ie grocery bags, laundry basket etc) with affected UE pain 2 of 10 or less. Pt will have at least 40 psi of  strength affected hand to improve ability to grasp and hold an object. Pts Quick DASH functional assessment score will be less than 20% functional deficit.     [facility] Portal     OT Protocols

## 2023-01-10 NOTE — LETTER
[address] [facility]  [address] [facility] [address] [address]  Phone: [phone]  Fax: SHYAM Kumari CNP        January 10, 2023     Patient: Gerald Mcintyre. YOB: 1965   Date of Visit: 1/10/2023       To Whom It May Concern: It is my medical opinion that Yany Armenta can return to work restricted to no lifting, pushing, or pulling with the left upper extremity for 4 weeks and driving as tolerated . If you have any questions or concerns, please don't hesitate to call.     Sincerely,        SHYAM Tavarez CNP

## 2023-01-10 NOTE — LETTER
[address],[facility] [address]  Phone: [phone]  Fax: SHYAM Kumari CNP        January 10, 2023     Patient: Adam Green. 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 If you have any questions or concerns, please don't hesitate to call.     Sincerely,        SHYAM Calderon CNP

## 2023-01-10 NOTE — PROGRESS NOTES
Patient was fitted and instructed on an IROM Elbow Brace for the left elbow. Patient read and signed documenting they understand and agree to [facility]'s current DME return policy.

## 2023-01-18 ENCOUNTER — OFFICE VISIT (OUTPATIENT)
Age: 58
End: 2023-01-18

## 2023-01-18 DIAGNOSIS — M25.622 STIFFNESS OF LEFT ELBOW JOINT: ICD-10-CM

## 2023-01-18 DIAGNOSIS — M25.522 LEFT ELBOW PAIN: ICD-10-CM

## 2023-01-18 DIAGNOSIS — S46.212A RUPTURE OF LEFT DISTAL BICEPS TENDON, INITIAL ENCOUNTER: Primary | ICD-10-CM

## 2023-01-18 NOTE — PROGRESS NOTES
[address] [facility]  [address] [address]  Dept: [phone]      Occupational Therapy Daily Note      Referring MD: Isak Beach MD    Diagnosis:     ICD-10-CM    1. Rupture of left distal biceps tendon, initial encounter  S46.212A       2. Left elbow pain  M25.522       3. Stiffness of left elbow joint  M25.622            Surgery: Date 12/27/22 : distal biceps repair    Therapy precautions: Tendon precautions    History of injury/onset : Was installing a honeycomb component in the back of a freezer unit when he felt a pop in the elbow area. Has had the right distal biceps repaired in the past.    Total Direct Treatment Time: 40 min                       Total In Office Time: 50 min    Preferred Name:  Caitlin Reddy HISTORY     [address] [place] [address]:   Past Medical History:   Diagnosis Date    Diverticulosis     H/O seasonal allergies     Unspecified sleep apnea     wears cpap   ,   Past Surgical History:   Procedure Laterality Date    BICEPS TENDON REPAIR Left 12/27/2022    LEFT DISTAL BICEPS TENDON REPAIR performed by Isak Beach MD at [address] [facility]    COLONOSCOPY N/A 12/13/2017    COLONOSCOPY  BMI 31 performed by Manolo Lundy MD at [address] ([place] [address]) FLX DX W/COLLJ Jeovanny 1978 PFRMD  12/13/2017         HERNIA REPAIR      ORTHOPEDIC SURGERY Right 2009    rolled bicep    OK UNLISTED PROCEDURE ABDOMEN PERITONEUM & OMENTUM  12/2021    colon resection      Medications. : Reviewed in chart  Allergies: Allergies   Allergen Reactions    Other Nausea And Vomiting     DARVOCET        SUBJECTIVE     Pt reports his brace slips down and causes some pinching at the incision site. Pt reports Thursday evening he started having some shoulder pain and neck pain but has since subsided. It was from shoulder blade all the way up into the neck, causing some pain with eating and swallowing as well.      OBJECTIVE     Functional Outcome Measures: Quick Dash  43 score=   72.72 % functional deficit  Hand/Side Dominance: left handed  Observation/Posture: Holding UE in protected position  Palpation: Tender surgical site  Swelling/Edema: moderate L elbow  Skin Integrity: incision well healed     A/PROM Measures of Affected Extremity    DATE 1/10/23 1/18/23     AROM   elbow ext    45°  20     AROM   elbow flex 120°  139     AROM   supination 80°  80     AROM   pronation 80°  80     PROM  elbow flex FULL  FULL         TREATMENT     Therapeutic exercise (96747) x  30 min:  Moist hot pack to left elbow x8 minutes   STM and retrograde massage   Home Exercise Program development and Education: see below. Patient I with program following instruction and performance  Use of heat and ice as needed for pain   Review of precautions   Pt perform various reps of given exercises as part of HEP, see below   Incorporate gentle isometric bicep contraction     Orthotics Management and Training Subsequent Encounter x 10 min:  Management and training including assessment and fitting of the upper extremity, initial encounter  The patient was educated on importance of compliance with hinged brace (continued)   Brace adjusted with education on proper donning in order to prevent brace sliding down the LUE    Access Code: [email or web address]  URL: [email or web address]  Date: 01/10/2023  Prepared by: Tushar Machado    Exercises  Isometric Tricep Extension - 2-3 x daily - 7 x weekly - 10 reps - 10 seconds hold  Seated Isometric Elbow Flexion - 2-3 x daily - 7 x weekly - 10 reps - 10 seconds hold  Forearm Supination PROM - 2-3 x daily - 7 x weekly - 5 reps - 30 seconds hold  Isometric Shoulder Flexion at Wall - 2 x daily - 7 x weekly - 1-2 sets - 10 reps - 10 sec hold  Standing Isometric Shoulder Internal Rotation at Doorway - 2 x daily - 7 x weekly - 1-2 sets - 10 reps - 10 sec hold  Isometric Shoulder External Rotation at Wall - 2 x daily - 7 x weekly - 1-2 sets - 10 reps - 10 sec hold  Isometric Shoulder Extension at Wall - 2 x daily - 7 x weekly - 1-2 sets - 10 reps - 10 sec hold      CLINICAL DECISION MAKING/ASSESSMENT     Pt maintaining good AROM of left elbow, precautions reviewed with patient in order to prevent incidence of re-injury or rupture. Pt demonstrate understanding of all exercises, reviewed. Pt will follow-up each week and advance through bicep repair protocol. PLAN     Progress to week 4 per protocol     GOALS     Short term goals:  1/31/2023  (3 weeks)  Patient will be I with HEP for ROM per protocol. The patient will be independent with meir/doffing elbow hinge brace. The patient will be independent with understanding the ROM setting of the hinged elbow brace by the end of one session. The patient will be independent with post-operative precautions in one session. Increase A/AROM of affected elbow flexion to at least 140 ° to improve function with feeding  Increase AROM of affected elbow extension incrementally as per protocol to improve function with reaching and donning LE dressing. Improve Quick DASH functional assessment score from 73% deficit to less than 50% deficit. Long term goals:  3/7/2023  (8 weeks)  Pt will be able to use affected UE for all ADL's without difficulty 100% of the time. Decrease edema affected elbow to minimal.  Pt will be able to sleep through the night with no pain in affected UE. Pt will have full active elbow flexion of affected extremity to improve function with tasks like feeding and lifting. Pt will have full active elbow extension of affected extremity to improve function with tasks that require reach into cabinets and across table. Pt will have full active forearm pronation of  affected extremity to improve ability with tasks like writing and placing/acquiring items on tabletop. Pt will have full active forearm supination of affected extremity to improve ability with tasks like opening doors, hold a plate.   Pt will be able to lift and carry items (ie grocery bags, laundry basket etc) with affected UE pain 2 of 10 or less. Pt will have at least 40 psi of  strength affected hand to improve ability to grasp and hold an object. Pts Quick DASH functional assessment score will be less than 20% functional deficit.     [facility]     OT Protocols

## 2023-01-25 ENCOUNTER — OFFICE VISIT (OUTPATIENT)
Age: 58
End: 2023-01-25

## 2023-01-25 DIAGNOSIS — S46.212A RUPTURE OF LEFT DISTAL BICEPS TENDON, INITIAL ENCOUNTER: Primary | ICD-10-CM

## 2023-01-25 DIAGNOSIS — M25.522 LEFT ELBOW PAIN: ICD-10-CM

## 2023-01-25 DIAGNOSIS — M25.622 STIFFNESS OF LEFT ELBOW JOINT: ICD-10-CM

## 2023-01-25 NOTE — PROGRESS NOTES
[address] [facility]  [address] [address]  Dept: [phone]      Occupational Therapy Daily Note      Referring MD: Patt Hernandez MD    Diagnosis:     ICD-10-CM    1. Rupture of left distal biceps tendon, initial encounter  S46.212A       2. Left elbow pain  M25.522       3. Stiffness of left elbow joint  M25.622            Surgery: Date 12/27/22 : distal biceps repair    Therapy precautions: Tendon precautions    History of injury/onset : Was installing a honeycomb component in the back of a freezer unit when he felt a pop in the elbow area. Has had the right distal biceps repaired in the past.    Total Direct Treatment Time: 40 min                       Total In Office Time: 50 min    Preferred Name:  Jus Mcgregor HISTORY     [address] [place] [address]:   Past Medical History:   Diagnosis Date    Diverticulosis     H/O seasonal allergies     Unspecified sleep apnea     wears cpap   ,   Past Surgical History:   Procedure Laterality Date    BICEPS TENDON REPAIR Left 12/27/2022    LEFT DISTAL BICEPS TENDON REPAIR performed by Patt Hernandez MD at [facility]    COLONOSCOPY N/A 12/13/2017    COLONOSCOPY  BMI 31 performed by Shaun Mcclain MD at [address] FLX DX W/COLLJ Jeovanny 1978 PFRMD  12/13/2017         HERNIA REPAIR      ORTHOPEDIC SURGERY Right 2009    rolled bicep    IA UNLISTED PROCEDURE ABDOMEN PERITONEUM & OMENTUM  12/2021    colon resection      Medications. : Reviewed in chart  Allergies: Allergies   Allergen Reactions    Other Nausea And Vomiting     DARVOCET        SUBJECTIVE     Pt reports the brace has been causing some irritation and bruising.      OBJECTIVE     Functional Outcome Measures: Quick Dash  43 score=   72.72 % functional deficit  Hand/Side Dominance: left handed  Observation/Posture: Holding UE in protected position  Palpation: Tender surgical site  Swelling/Edema: moderate L elbow  Skin Integrity: incision well healed     A/PROM Measures of Affected Extremity    DATE 1/10/23 1/18/23     AROM   elbow ext    45°  20     AROM   elbow flex 120°  139     AROM   supination 80°  80     AROM   pronation 80°  80     PROM  elbow flex FULL  FULL         TREATMENT  Therapeutic exercise (80918) x  30 min:  Moist hot pack to left elbow x8 minutes   STM and retrograde massage   Review of precautions   Pt perform various reps of given exercises as part of HEP, see below   Elbow F/E  Tricep/bicep gentle isometrics   Supination/pronation  Wrist F/E    Orthotics Management and Training Subsequent Encounter x 10 min:  Management and training including assessment and fitting of the upper extremity, initial encounter  Padding added to x2 proximal straps to decrease incidence of skin irritation  Orthosis adjusted to increase elbow extension to -30 degrees per protocol       Access Code: [email or web address]  URL: [email or web address]  Date: 01/10/2023  Prepared by: Edward Torrez    Exercises  Isometric Tricep Extension - 2-3 x daily - 7 x weekly - 10 reps - 10 seconds hold  Seated Isometric Elbow Flexion - 2-3 x daily - 7 x weekly - 10 reps - 10 seconds hold  Forearm Supination PROM - 2-3 x daily - 7 x weekly - 5 reps - 30 seconds hold  Isometric Shoulder Flexion at Wall - 2 x daily - 7 x weekly - 1-2 sets - 10 reps - 10 sec hold  Standing Isometric Shoulder Internal Rotation at Doorway - 2 x daily - 7 x weekly - 1-2 sets - 10 reps - 10 sec hold  Isometric Shoulder External Rotation at Wall - 2 x daily - 7 x weekly - 1-2 sets - 10 reps - 10 sec hold  Isometric Shoulder Extension at Wall - 2 x daily - 7 x weekly - 1-2 sets - 10 reps - 10 sec hold      CLINICAL DECISION MAKING/ASSESSMENT     Pt continues to demonstrate excellent AROM and minimal to no pain. Continued education on precautions and course of protocol, ie. Limiting end range extension and loading to LUE of any kind.        PLAN     Progress to week 5 per protocol     GOALS     Short term goals:  1/31/2023 (3 weeks)  Patient will be I with HEP for ROM per protocol. (MET, continue to monitor and progress)   The patient will be independent with meir/doffing elbow hinge brace. (MET)   The patient will be independent with understanding the ROM setting of the hinged elbow brace by the end of one session. (MET)   The patient will be independent with post-operative precautions in one session. (MET)   Increase A/AROM of affected elbow flexion to at least 140 ° to improve function with feeding (progressing)  Increase AROM of affected elbow extension incrementally as per protocol to improve function with reaching and donning LE dressing. Improve Quick DASH functional assessment score from 73% deficit to less than 50% deficit. Long term goals:  3/7/2023  (8 weeks)  Pt will be able to use affected UE for all ADL's without difficulty 100% of the time. Decrease edema affected elbow to minimal.  Pt will be able to sleep through the night with no pain in affected UE. Pt will have full active elbow flexion of affected extremity to improve function with tasks like feeding and lifting. Pt will have full active elbow extension of affected extremity to improve function with tasks that require reach into cabinets and across table. Pt will have full active forearm pronation of  affected extremity to improve ability with tasks like writing and placing/acquiring items on tabletop. Pt will have full active forearm supination of affected extremity to improve ability with tasks like opening doors, hold a plate. Pt will be able to lift and carry items (ie grocery bags, laundry basket etc) with affected UE pain 2 of 10 or less. Pt will have at least 40 psi of  strength affected hand to improve ability to grasp and hold an object. Pts Quick DASH functional assessment score will be less than 20% functional deficit.     [employer] Portal     OT Protocols

## 2023-02-01 ENCOUNTER — OFFICE VISIT (OUTPATIENT)
Age: 58
End: 2023-02-01
Payer: COMMERCIAL

## 2023-02-01 DIAGNOSIS — S46.212A RUPTURE OF LEFT DISTAL BICEPS TENDON, INITIAL ENCOUNTER: Primary | ICD-10-CM

## 2023-02-01 DIAGNOSIS — M25.622 STIFFNESS OF LEFT ELBOW JOINT: ICD-10-CM

## 2023-02-01 DIAGNOSIS — M25.522 LEFT ELBOW PAIN: ICD-10-CM

## 2023-02-01 PROCEDURE — 97110 THERAPEUTIC EXERCISES: CPT | Performed by: OCCUPATIONAL THERAPIST

## 2023-02-01 NOTE — PROGRESS NOTES
[facility]  [address] [place] [address]  Dept: [phone]      Occupational Therapy Daily Note      Referring MD: Alda Hernández MD    Diagnosis:     ICD-10-CM    1. Rupture of left distal biceps tendon, initial encounter  S46.212A       2. Left elbow pain  M25.522       3. Stiffness of left elbow joint  M25.622            Surgery: Date 12/27/22 : distal biceps repair    Therapy precautions: Tendon precautions    History of injury/onset : Was installing a honeycomb component in the back of a freezer unit when he felt a pop in the elbow area.  Has had the right distal biceps repaired in the past.    Total Direct Treatment Time: 40 min                       Total In Office Time: 50 min    Preferred Name:  Iftikhar    PERTINENT MEDICAL HISTORY     PMHX & Meds:   Past Medical History:   Diagnosis Date    Diverticulosis     H/O seasonal allergies     Unspecified sleep apnea     wears cpap   ,   Past Surgical History:   Procedure Laterality Date    BICEPS TENDON REPAIR Left 12/27/2022    LEFT DISTAL BICEPS TENDON REPAIR performed by Alda Hernández MD at [facility]    COLONOSCOPY N/A 12/13/2017    COLONOSCOPY  BMI 31 performed by Angel Glynn MD at [facility]    COLONOSCOPY FLX DX W/COLLJ SPEC WHEN PFRMD  12/13/2017         HERNIA REPAIR      ORTHOPEDIC SURGERY Right 2009    rolled bicep    DE UNLISTED PROCEDURE ABDOMEN PERITONEUM & OMENTUM  12/2021    colon resection      Medications. : Reviewed in chart  Allergies:   Allergies   Allergen Reactions    Other Nausea And Vomiting     DARVOCET        SUBJECTIVE     Pt is 5 weeks post-op. Pt does not report any pain. He states that the padding that was added to the brace did help.     OBJECTIVE     Functional Outcome Measures: Quick Dash  43 score=   72.72 % functional deficit  Hand/Side Dominance: left handed  Observation/Posture: Holding UE in protected position  Palpation: Tender surgical site  Swelling/Edema: moderate L elbow  Skin  Integrity: incision well healed     A/PROM Measures of Affected Extremity    DATE 1/10/23 1/18/23     AROM   elbow ext    45°  20     AROM   elbow flex 120°  139     AROM   supination 80°  80     AROM   pronation 80°  80     PROM  elbow flex FULL  FULL       TREATMENT  Therapeutic exercise (01772) x  40 min:  Moist hot pack to left elbow x8 minutes   STM and retrograde massage   Review of precautions   Pt perform various reps of given exercises as part of HEP, see below   Elbow F/E 2x15   Tricep/bicep gentle isometrics 3x15  Supination/pronation elbow at 90 degrees  Gentle towel squeeze with forearm in neutral   Shoulder isometrics at wall 2x15         Access Code: [email or web address]  URL: [email or web address]  Date: 01/10/2023  Prepared by: Bobby Godwin    Exercises  Isometric Tricep Extension - 2-3 x daily - 7 x weekly - 10 reps - 10 seconds hold  Seated Isometric Elbow Flexion - 2-3 x daily - 7 x weekly - 10 reps - 10 seconds hold  Forearm Supination PROM - 2-3 x daily - 7 x weekly - 5 reps - 30 seconds hold  Isometric Shoulder Flexion at Wall - 2 x daily - 7 x weekly - 1-2 sets - 10 reps - 10 sec hold  Standing Isometric Shoulder Internal Rotation at Doorway - 2 x daily - 7 x weekly - 1-2 sets - 10 reps - 10 sec hold  Isometric Shoulder External Rotation at Wall - 2 x daily - 7 x weekly - 1-2 sets - 10 reps - 10 sec hold  Isometric Shoulder Extension at Wall - 2 x daily - 7 x weekly - 1-2 sets - 10 reps - 10 sec hold      ASSESSMENT     Excellent elbow AROM. Pt maintains precautions well.        PLAN     Progress to week 6 per protocol   Combined elbow F/E and sup/pro  Progressive  strengthening with elbow   Follow-up with Janet Worthington next week     GOALS     Short term goals:  1/31/2023  (3 weeks)  Patient will be I with HEP for ROM per protocol. (MET, continue to monitor and progress)   The patient will be independent with meir/doffing elbow hinge brace. (MET)   The patient will be independent with understanding the ROM setting of the hinged elbow brace by the end of one session. (MET)   The patient will be independent with post-operative precautions in one session. (MET)   Increase A/AROM of affected elbow flexion to at least 140 ° to improve function with feeding (progressing)  Increase AROM of affected elbow extension incrementally as per protocol to improve function with reaching and donning LE dressing. (MET   Improve Quick DASH functional assessment score from 73% deficit to less than 50% deficit. (MET)      Long term goals:  3/7/2023  (8 weeks)  Pt will be able to use affected UE for all ADL's without difficulty 100% of the time. Decrease edema affected elbow to minimal.  Pt will be able to sleep through the night with no pain in affected UE. Pt will have full active elbow flexion of affected extremity to improve function with tasks like feeding and lifting. Pt will have full active elbow extension of affected extremity to improve function with tasks that require reach into cabinets and across table. Pt will have full active forearm pronation of  affected extremity to improve ability with tasks like writing and placing/acquiring items on tabletop. Pt will have full active forearm supination of affected extremity to improve ability with tasks like opening doors, hold a plate. Pt will be able to lift and carry items (ie grocery bags, laundry basket etc) with affected UE pain 2 of 10 or less. Pt will have at least 40 psi of  strength affected hand to improve ability to grasp and hold an object. Pts Quick DASH functional assessment score will be less than 20% functional deficit.     [facility] Portal     OT Protocols

## 2023-02-08 ENCOUNTER — OFFICE VISIT (OUTPATIENT)
Age: 58
End: 2023-02-08

## 2023-02-08 ENCOUNTER — OFFICE VISIT (OUTPATIENT)
Dept: ORTHOPEDIC SURGERY | Age: 58
End: 2023-02-08

## 2023-02-08 DIAGNOSIS — M25.622 STIFFNESS OF LEFT ELBOW JOINT: ICD-10-CM

## 2023-02-08 DIAGNOSIS — S46.212A RUPTURE OF LEFT DISTAL BICEPS TENDON, INITIAL ENCOUNTER: Primary | ICD-10-CM

## 2023-02-08 DIAGNOSIS — M25.522 LEFT ELBOW PAIN: ICD-10-CM

## 2023-02-08 NOTE — LETTER
[address] [address]  Phone: [phone]  Fax: SHYAM Kumari CNP        February 8, 2023     Patient: Luz Elena Smoker. YOB: 1965   Date of Visit: 2/8/2023       To Whom It May Concern: It is my medical opinion that Tyron Martínez may return to work with the following restrictions: No lifting, pushing, or pulling more than 5 pounds with the left upper extremity. He may climb stairs, but no 90 degree ladders. These restrictions are in place until follow up appointment in 4 weeks. In 4 weeks he may return to regular duty with no restrictions. If you have any questions or concerns, please don't hesitate to call.     Sincerely,        SHYAM Atwood CNP

## 2023-02-08 NOTE — PROGRESS NOTES
Orthopaedic Hand Clinic Note    Name: My Yadav. YOB: 1965  Gender: male  MRN: [id number]      Follow up visit:   1. Rupture of left distal biceps tendon, initial encounter    2. Left elbow pain        HPI: My Mendiola is a 56 y.o. male who is following up for left distal biceps tendon repair. He 6 weeks out from surgery. He said he is doing great. He is working with therapy. He has been compliant with his brace and his restrictions. ROS/Meds/PSH/PMH/FH/SH: I personally reviewed the patients standard intake form. Pertinents are discussed in the HPI    Physical Examination:    Musculoskeletal Examination:  Examination on the left upper extremity demonstrates cap refill < 5 seconds in all fingers,  Patient has a well-healed incision to the left elbow. .  He has 0 degrees of extension to 130 degrees of flexion. 90 degrees pronation, 90 degrees of supination. He denies paresthesia. He has good capillary refill. Imaging / Electrodiagnostic Tests:     None    Assessment:     ICD-10-CM    1. Rupture of left distal biceps tendon, initial encounter  S46.212A       2. Left elbow pain  M25.522           Plan:   We discussed the diagnosis and different treatment options. We discussed observation, therapy, antiinflammatory medications and other pertinent treatment modalities. After discussing in detail the patient elects to proceed with continuing therapy. They can work on strengthening. He can discontinue the brace. He can wear it during certain activities for the next couple of weeks. We will give him a work note. We will see him back in 2 months. This appointment needs to be scheduled with Dr. Abdullahi Hinton to be rated and released. .     Patient voiced accordance and understanding of the information provided and the formulated plan. All questions were answered to the patient's satisfaction during the encounter.     Treatment at this time:  Therapy, work restrictions    SHYAM Swift - CNP  Orthopaedic Surgery  02/08/23  3:24 PM

## 2023-02-15 ENCOUNTER — OFFICE VISIT (OUTPATIENT)
Age: 58
End: 2023-02-15

## 2023-02-15 DIAGNOSIS — M25.522 LEFT ELBOW PAIN: ICD-10-CM

## 2023-02-15 DIAGNOSIS — M25.622 STIFFNESS OF LEFT ELBOW JOINT: ICD-10-CM

## 2023-02-15 DIAGNOSIS — S46.212A RUPTURE OF LEFT DISTAL BICEPS TENDON, INITIAL ENCOUNTER: Primary | ICD-10-CM

## 2023-02-15 NOTE — PROGRESS NOTES
[address] [facility]  [address] [address]  Dept: [phone]      Occupational Therapy Daily Note      Referring MD: Kathleen Gatica MD    Diagnosis:     ICD-10-CM    1. Rupture of left distal biceps tendon, initial encounter  S46.212A       2. Left elbow pain  M25.522       3. Stiffness of left elbow joint  M25.622            Surgery: Date 12/27/22 : distal biceps repair    Therapy precautions: Tendon precautions    History of injury/onset : Was installing a honeycomb component in the back of a freezer unit when he felt a pop in the elbow area. Has had the right distal biceps repaired in the past.    Total Direct Treatment Time: 55 min                       Total In Office Time: 65 min    Preferred Name:  Wander Bolaños HISTORY     [address] [facility]:   Past Medical History:   Diagnosis Date    Diverticulosis     H/O seasonal allergies     Unspecified sleep apnea     wears cpap   ,   Past Surgical History:   Procedure Laterality Date    BICEPS TENDON REPAIR Left 12/27/2022    LEFT DISTAL BICEPS TENDON REPAIR performed by Kathleen Gatica MD at [facility]    COLONOSCOPY N/A 12/13/2017    COLONOSCOPY  BMI 31 performed by Hailey Maldonado MD at [address] [facility] FLX DX W/COLLMALLORY Up 1978 PFRMD  12/13/2017         HERNIA REPAIR      ORTHOPEDIC SURGERY Right 2009    rolled bicep    DE UNLISTED PROCEDURE ABDOMEN PERITONEUM & OMENTUM  12/2021    colon resection      Medications. : Reviewed in chart  Allergies: Allergies   Allergen Reactions    Other Nausea And Vomiting     DARVOCET        SUBJECTIVE     Pt is 7 weeks post-op. He states Delfina Phillip has cleared him to not wear his brace during the day, wear only when he feels he needs protection.      OBJECTIVE     Functional Outcome Measures: Quick Dash  43 score=   72.72 % functional deficit  Hand/Side Dominance: left handed  Observation/Posture: Holding UE in protected position  Palpation: Tender surgical site  Swelling/Edema: moderate L elbow  Skin Integrity: incision well healed     A/PROM Measures of Affected Extremity    DATE 1/10/23 1/18/23 2/8/23    AROM   elbow ext    45°  20 -5    AROM   elbow flex 120°  139 142    AROM   supination 80°  80 80    AROM   pronation 80°  80 80    PROM  elbow flex FULL  FULL FULL       Strength  Strength (psi): RIGHT  2/15/23 LEFT  2/15/23      Position II 87 57     Lat Pinch: 28 25     2pt pinch: 15 12     3pt pinch: 23 15        TREATMENT  Therapeutic exercise (83686) x  55 min:  Moist hot pack to left elbow x8 minutes   Continued review of precautions   BRF trainin% occlusion (95 mmHg): see below for details   Non-weighted elbow F/E through full range of motion,   30, 15, 15, 15 with 30 second rest in between   Pt perform various reps of given exercises as part of HEP, see below   Elbow F/E 2x15 with combined forearm rotation   Tricep/bicep gentle isometrics 2x15   Declining dowel press red theraputty until light fatigue in forearm   Utilized as isometric elbow extension and gentle gripping   Updated: wall push-ups, light weighted sup/pro (2# DB), wrist F/E with 2# DB    Patient was educated on blood flow restriction treatment, expectations, and post-treatment instructions. BFR contraindications: Reviewed- none noted  Patient provided verbal consent for use of BFR    Cuff size and Location Small    Arterial Occlusion Pressure 240 mmHg       Access Code: [email or web address]  URL: [email or web address]  Date: 01/10/2023  Prepared by: Arnold Pena    Exercises  Isometric Tricep Extension - 2-3 x daily - 7 x weekly - 10 reps - 10 seconds hold  Seated Isometric Elbow Flexion - 2-3 x daily - 7 x weekly - 10 reps - 10 seconds hold  Forearm Supination PROM - 2-3 x daily - 7 x weekly - 5 reps - 30 seconds hold  Isometric Shoulder Flexion at Wall - 2 x daily - 7 x weekly - 1-2 sets - 10 reps - 10 sec hold  Standing Isometric Shoulder Internal Rotation at Doorway - 2 x daily - 7 x weekly - 1-2 sets - 10 reps - 10 sec hold  Isometric Shoulder External Rotation at Wall - 2 x daily - 7 x weekly - 1-2 sets - 10 reps - 10 sec hold  Isometric Shoulder Extension at Wall - 2 x daily - 7 x weekly - 1-2 sets - 10 reps - 10 sec hold      Access Code: [email or web address]  URL: [email or web address]  Date: 02/15/2023  Prepared by: Lety Jean    Exercises  Forearm Pronation and Supination with Hammer - 2 x daily - 7 x weekly - 2-3 sets - 15 reps - 3 sec hold  Wrist Extension with Dumbbell - 2 x daily - 7 x weekly - 2-3 sets - 15 reps - 3 sec hold  Wrist Flexion with Dumbbell - 2 x daily - 7 x weekly - 2-3 sets - 15 reps - 3 sec hold  Wall Push Up - 2 x daily - 7 x weekly - 2-3 sets - 15 reps - 3 sec hold  Standing Single Arm Bicep Curls Supinated with Dumbbell - 2 x daily - 7 x weekly - 2-3 sets - 15 reps - 3 sec hold    ASSESSMENT     Pt continues to progress through bicep repair protocol and healing very well. D/c of orthosis as of last follow-up with Janet Worthington. We initiated BFR training today and pt tolerated well.  See above for upgrade to HEP, will continue to upgrade per protocol with progressive strengthening      PLAN     Progress to week 8, 2x/week for next week before pt goes out of town for a week   BFR training, increase occlusion to 45% or add hold of light object     GOALS     Short term goals:  1/31/2023  (3 weeks)  Patient will be I with HEP for ROM per protocol. (MET, continue to monitor and progress)   The patient will be independent with meir/doffing elbow hinge brace. (MET)   The patient will be independent with understanding the ROM setting of the hinged elbow brace by the end of one session. (MET)   The patient will be independent with post-operative precautions in one session. (MET)   Increase A/AROM of affected elbow flexion to at least 140 ° to improve function with feeding (progressing)  Increase AROM of affected elbow extension incrementally as per protocol to improve function with reaching and donning LE dressing. (MET   Improve Quick DASH functional assessment score from 73% deficit to less than 50% deficit. (MET)      Long term goals:  3/7/2023  (8 weeks)  Pt will be able to use affected UE for all ADL's without difficulty 100% of the time. Decrease edema affected elbow to minimal.  Pt will be able to sleep through the night with no pain in affected UE. Pt will have full active elbow flexion of affected extremity to improve function with tasks like feeding and lifting. Pt will have full active elbow extension of affected extremity to improve function with tasks that require reach into cabinets and across table. Pt will have full active forearm pronation of  affected extremity to improve ability with tasks like writing and placing/acquiring items on tabletop. Pt will have full active forearm supination of affected extremity to improve ability with tasks like opening doors, hold a plate. Pt will be able to lift and carry items (ie grocery bags, laundry basket etc) with affected UE pain 2 of 10 or less. Pt will have at least 40 psi of  strength affected hand to improve ability to grasp and hold an object. Pts Quick DASH functional assessment score will be less than 20% functional deficit.     [employer] Portal     OT Protocols

## 2023-02-20 ENCOUNTER — OFFICE VISIT (OUTPATIENT)
Age: 58
End: 2023-02-20
Payer: COMMERCIAL

## 2023-02-20 DIAGNOSIS — M25.522 LEFT ELBOW PAIN: ICD-10-CM

## 2023-02-20 DIAGNOSIS — M25.622 STIFFNESS OF LEFT ELBOW JOINT: ICD-10-CM

## 2023-02-20 DIAGNOSIS — S46.212A RUPTURE OF LEFT DISTAL BICEPS TENDON, INITIAL ENCOUNTER: Primary | ICD-10-CM

## 2023-02-20 PROCEDURE — 97110 THERAPEUTIC EXERCISES: CPT | Performed by: OCCUPATIONAL THERAPIST

## 2023-02-20 NOTE — PROGRESS NOTES
[address] [facility]  [address] [address]  Dept: [phone]      Occupational Therapy Daily Note      Referring MD: Adam Coleman MD    Diagnosis:     ICD-10-CM    1. Rupture of left distal biceps tendon, initial encounter  S46.212A       2. Left elbow pain  M25.522       3. Stiffness of left elbow joint  M25.622            Surgery: Date 12/27/22 : distal biceps repair    Therapy precautions: Tendon precautions    History of injury/onset : Was installing a honeycomb component in the back of a freezer unit when he felt a pop in the elbow area. Has had the right distal biceps repaired in the past.    Total Direct Treatment Time: 55 min                       Total In Office Time: 65 min    Preferred Name:  Sakina Doctor HISTORY     [address] [place] [address]:   Past Medical History:   Diagnosis Date    Diverticulosis     H/O seasonal allergies     Unspecified sleep apnea     wears cpap   ,   Past Surgical History:   Procedure Laterality Date    BICEPS TENDON REPAIR Left 12/27/2022    LEFT DISTAL BICEPS TENDON REPAIR performed by Adam Coleman MD at [facility]    COLONOSCOPY N/A 12/13/2017    COLONOSCOPY  BMI 31 performed by Meron Ponce MD at [address] ([place] [address]) FLX DX W/BOZENA Up 1978 PFRMD  12/13/2017         HERNIA REPAIR      ORTHOPEDIC SURGERY Right 2009    rolled bicep    IN UNLISTED PROCEDURE ABDOMEN PERITONEUM & OMENTUM  12/2021    colon resection      Medications. : Reviewed in chart  Allergies: Allergies   Allergen Reactions    Other Nausea And Vomiting     DARVOCET        SUBJECTIVE     Pt is 7 weeks post-op. He states Noni Bang has cleared him to not wear his brace during the day, wear only when he feels he needs protection.      OBJECTIVE     Functional Outcome Measures: Quick Dash  43 score=   72.72 % functional deficit  Hand/Side Dominance: left handed  Observation/Posture: Holding UE in protected position  Palpation: Tender surgical site  Swelling/Edema: moderate L elbow  Skin Integrity: incision well healed     A/PROM Measures of Affected Extremity    DATE 1/10/23 1/18/23 2/8/23    AROM   elbow ext    45°  20 -5    AROM   elbow flex 120°  139 142    AROM   supination 80°  80 80    AROM   pronation 80°  80 80    PROM  elbow flex FULL  FULL FULL       Strength  Strength (psi): RIGHT  2/15/23 LEFT  2/15/23 LEFT  23 RIGHT  23    Position II 87 57 88 98   Lat Pinch: 28 25     2pt pinch: 15 12     3pt pinch: 23 15        TREATMENT  Therapeutic exercise (59140) x  45 min:  Moist hot pack to left elbow x8 minutes   Continued review of precautions   BRF trainin% occlusion (92 mmHg): see below for details   elbow F/E through full range of motion, hold of yellow theraband flexbar   30, 15, 15, 15 with 30 second rest in between   Pt perform various reps of given exercises as part of HEP, see below   Elbow F/E 2x15 with combined forearm rotation   Theraband flexbar press into red theraputty   Posterior row (scapular squeeze) and external rotation with yellow theraband flexbar 2x20   Tricep/bicep gentle isometrics 2x15   Utilized as isometric elbow extension and gentle gripping  Pt perform:  wall push-ups, light weighted sup/pro , wrist F/E with 2# DB, wall \"A, B, C\" with 1# wrist weight     Patient was educated on blood flow restriction treatment, expectations, and post-treatment instructions. BFR contraindications: Reviewed- none noted  Patient provided verbal consent for use of BFR    Cuff size and Location Small    Arterial Occlusion Pressure 240 mmHg       Access Code: [email or web address]  URL: [email or web address]  Date: 01/10/2023  Prepared by: Brian Caputo    Exercises  Isometric Tricep Extension - 2-3 x daily - 7 x weekly - 10 reps - 10 seconds hold  Seated Isometric Elbow Flexion - 2-3 x daily - 7 x weekly - 10 reps - 10 seconds hold  Forearm Supination PROM - 2-3 x daily - 7 x weekly - 5 reps - 30 seconds hold  Isometric Shoulder Flexion at [address] - 2 x daily - 7 x weekly - 1-2 sets - 10 reps - 10 sec hold  Standing Isometric Shoulder Internal Rotation at Doorway - 2 x daily - 7 x weekly - 1-2 sets - 10 reps - 10 sec hold  Isometric Shoulder External Rotation at Wall - 2 x daily - 7 x weekly - 1-2 sets - 10 reps - 10 sec hold  Isometric Shoulder Extension at Wall - 2 x daily - 7 x weekly - 1-2 sets - 10 reps - 10 sec hold      Access Code: [email or web address]  URL: [email or web address]  Date: 02/15/2023  Prepared by: Hayes Alberto    Exercises  Forearm Pronation and Supination with Hammer - 2 x daily - 7 x weekly - 2-3 sets - 15 reps - 3 sec hold  Wrist Extension with Dumbbell - 2 x daily - 7 x weekly - 2-3 sets - 15 reps - 3 sec hold  Wrist Flexion with Dumbbell - 2 x daily - 7 x weekly - 2-3 sets - 15 reps - 3 sec hold  Wall Push Up - 2 x daily - 7 x weekly - 2-3 sets - 15 reps - 3 sec hold  Standing Single Arm Bicep Curls Supinated with Dumbbell - 2 x daily - 7 x weekly - 2-3 sets - 15 reps - 3 sec hold    ASSESSMENT     Pt continues to progress through bicep repair protocol and healing very well (continued). Pt has gained almost 20psi  strength in 5 days. Pt progressing well with strength. One more OT visit scheduled this week and then patient will be out of town for a week, will return the following week.      PLAN     Continue along week 8, progressive shoulder, elbow and forearm strengthening   BFR training, increase occlusion to 45% and hold of light object, perform with supination/pronation as well     GOALS     Short term goals:  1/31/2023  (3 weeks)  Patient will be I with HEP for ROM per protocol. (MET, continue to monitor and progress)   The patient will be independent with meir/doffing elbow hinge brace. (MET)   The patient will be independent with understanding the ROM setting of the hinged elbow brace by the end of one session. (MET)   The patient will be independent with post-operative precautions in one session. (MET) Increase A/AROM of affected elbow flexion to at least 140 ° to improve function with feeding (progressing)  Increase AROM of affected elbow extension incrementally as per protocol to improve function with reaching and donning LE dressing. (MET   Improve Quick DASH functional assessment score from 73% deficit to less than 50% deficit. (MET)      Long term goals:  3/7/2023  (8 weeks)  Pt will be able to use affected UE for all ADL's without difficulty 100% of the time. Decrease edema affected elbow to minimal.  Pt will be able to sleep through the night with no pain in affected UE. Pt will have full active elbow flexion of affected extremity to improve function with tasks like feeding and lifting. Pt will have full active elbow extension of affected extremity to improve function with tasks that require reach into cabinets and across table. Pt will have full active forearm pronation of  affected extremity to improve ability with tasks like writing and placing/acquiring items on tabletop. Pt will have full active forearm supination of affected extremity to improve ability with tasks like opening doors, hold a plate. Pt will be able to lift and carry items (ie grocery bags, laundry basket etc) with affected UE pain 2 of 10 or less. Pt will have at least 40 psi of  strength affected hand to improve ability to grasp and hold an object. Pts Quick DASH functional assessment score will be less than 20% functional deficit.     [employer] Portal     OT Protocols

## 2023-02-22 ENCOUNTER — OFFICE VISIT (OUTPATIENT)
Age: 58
End: 2023-02-22
Payer: COMMERCIAL

## 2023-02-22 DIAGNOSIS — S46.212A RUPTURE OF LEFT DISTAL BICEPS TENDON, INITIAL ENCOUNTER: Primary | ICD-10-CM

## 2023-02-22 DIAGNOSIS — M25.622 STIFFNESS OF LEFT ELBOW JOINT: ICD-10-CM

## 2023-02-22 DIAGNOSIS — M25.522 LEFT ELBOW PAIN: ICD-10-CM

## 2023-02-22 PROCEDURE — 97110 THERAPEUTIC EXERCISES: CPT | Performed by: OCCUPATIONAL THERAPIST

## 2023-02-22 NOTE — PROGRESS NOTES
[address] [facility]  [address] [address]  Dept: [phone]      Occupational Therapy Daily Note      Referring MD: Susana Vogel MD    Diagnosis:     ICD-10-CM    1. Rupture of left distal biceps tendon, initial encounter  S46.212A       2. Left elbow pain  M25.522       3. Stiffness of left elbow joint  M25.622            Surgery: Date 12/27/22 : distal biceps repair    Therapy precautions: Tendon precautions    History of injury/onset : Was installing a honeycomb component in the back of a freezer unit when he felt a pop in the elbow area. Has had the right distal biceps repaired in the past.    Total Direct Treatment Time: 45 min                       Total In Office Time: 60 min    Preferred Name:  [employer] HISTORY     [address] [place] [address]:   Past Medical History:   Diagnosis Date    Diverticulosis     H/O seasonal allergies     Unspecified sleep apnea     wears cpap   ,   Past Surgical History:   Procedure Laterality Date    BICEPS TENDON REPAIR Left 12/27/2022    LEFT DISTAL BICEPS TENDON REPAIR performed by Susana Vogel MD at [facility]    COLONOSCOPY N/A 12/13/2017    COLONOSCOPY  BMI 31 performed by Adrianne Colby MD at [address] ([place] [address]) FLX DX W/COLLMALLORY Up 1978 PFRMD  12/13/2017         HERNIA REPAIR      ORTHOPEDIC SURGERY Right 2009    rolled bicep    FL UNLISTED PROCEDURE ABDOMEN PERITONEUM & OMENTUM  12/2021    colon resection      Medications. : Reviewed in chart  Allergies: Allergies   Allergen Reactions    Other Nausea And Vomiting     DARVOCET        SUBJECTIVE     Pt is 7 weeks post-op. He states Rodrigue Mcconnell has cleared him to not wear his brace during the day, wear only when he feels he needs protection.      OBJECTIVE     Functional Outcome Measures: Quick Dash  43 score=   72.72 % functional deficit  Hand/Side Dominance: left handed  Observation/Posture: Holding UE in protected position  Palpation: Tender surgical site  Swelling/Edema: moderate L elbow  Skin Integrity: incision well healed     A/PROM Measures of Affected Extremity    DATE 1/10/23 1/18/23 2/8/23    AROM   elbow ext    45°  20 -5    AROM   elbow flex 120°  139 142    AROM   supination 80°  80 80    AROM   pronation 80°  80 80    PROM  elbow flex FULL  FULL FULL       Strength  Strength (psi): RIGHT  2/15/23 LEFT  2/15/23 LEFT  23 RIGHT  23    Position II 87 57 88 98   Lat Pinch: 28 25     2pt pinch: 15 12     3pt pinch: 23 15        TREATMENT  Therapeutic exercise (17196) x  45 min:  Moist hot pack to left elbow x8 minutes   Continued review of precautions   BRF trainin% occlusion (104mmHg): see below for details   elbow F/E through full range of motion, hold of yellow theraband flexbar   30, 15, 15, 15 with 30 second rest in between   Pt perform various reps of given exercises:  Theraband flexbar press into red theraputty   Red theraputty lateral  and pull for  strengthening   Posterior row (scapular squeeze) and external rotation with red theraband flexbar 2x15   Pt perform:  wall push-ups, light weighted sup/pro, wrist F/E and radial deviation with 3# DB, wall \"A, B, C\" with 2# wrist weight     Patient was educated on blood flow restriction treatment, expectations, and post-treatment instructions. BFR contraindications: Reviewed- none noted  Patient provided verbal consent for use of BFR    Cuff size and Location Small    Arterial Occlusion Pressure 220 mmHg         Access Code: [email or web address]  URL: [email or web address]  Date: 01/10/2023  Prepared by: Kerrie Holly    Exercises  Isometric Tricep Extension - 2-3 x daily - 7 x weekly - 10 reps - 10 seconds hold  Seated Isometric Elbow Flexion - 2-3 x daily - 7 x weekly - 10 reps - 10 seconds hold  Forearm Supination PROM - 2-3 x daily - 7 x weekly - 5 reps - 30 seconds hold  Isometric Shoulder Flexion at Wall - 2 x daily - 7 x weekly - 1-2 sets - 10 reps - 10 sec hold  Standing Isometric Shoulder Internal Rotation at Doorway - 2 x daily - 7 x weekly - 1-2 sets - 10 reps - 10 sec hold  Isometric Shoulder External Rotation at Wall - 2 x daily - 7 x weekly - 1-2 sets - 10 reps - 10 sec hold  Isometric Shoulder Extension at Wall - 2 x daily - 7 x weekly - 1-2 sets - 10 reps - 10 sec hold      Access Code: [email or web address]  URL: [email or web address]  Date: 02/15/2023  Prepared by: Stephanie Black    Exercises  Forearm Pronation and Supination with Hammer - 2 x daily - 7 x weekly - 2-3 sets - 15 reps - 3 sec hold  Wrist Extension with Dumbbell - 2 x daily - 7 x weekly - 2-3 sets - 15 reps - 3 sec hold  Wrist Flexion with Dumbbell - 2 x daily - 7 x weekly - 2-3 sets - 15 reps - 3 sec hold  Wall Push Up - 2 x daily - 7 x weekly - 2-3 sets - 15 reps - 3 sec hold  Standing Single Arm Bicep Curls Supinated with Dumbbell - 2 x daily - 7 x weekly - 2-3 sets - 15 reps - 3 sec hold    ASSESSMENT     Continued progress. Progressive strengthening of shoulder, wrist, forearm and elbow. We increased BFR occlusion to 45%. Pt will be out of town for a week next week and will return the following week. PLAN     Week 10 once patient returns  Add light weight with BFR    GOALS     Short term goals:  1/31/2023  (3 weeks)  Patient will be I with HEP for ROM per protocol. (MET, continue to monitor and progress)   The patient will be independent with meir/doffing elbow hinge brace. (MET)   The patient will be independent with understanding the ROM setting of the hinged elbow brace by the end of one session. (MET)   The patient will be independent with post-operative precautions in one session. (MET)   Increase A/AROM of affected elbow flexion to at least 140 ° to improve function with feeding (progressing)  Increase AROM of affected elbow extension incrementally as per protocol to improve function with reaching and donning LE dressing.  (MET   Improve Quick DASH functional assessment score from 73% deficit to less than 50% deficit. (MET)      Long term goals:  3/7/2023  (8 weeks)  Pt will be able to use affected UE for all ADL's without difficulty 100% of the time. Decrease edema affected elbow to minimal.  Pt will be able to sleep through the night with no pain in affected UE. Pt will have full active elbow flexion of affected extremity to improve function with tasks like feeding and lifting. Pt will have full active elbow extension of affected extremity to improve function with tasks that require reach into cabinets and across table. Pt will have full active forearm pronation of  affected extremity to improve ability with tasks like writing and placing/acquiring items on tabletop. Pt will have full active forearm supination of affected extremity to improve ability with tasks like opening doors, hold a plate. Pt will be able to lift and carry items (ie grocery bags, laundry basket etc) with affected UE pain 2 of 10 or less. Pt will have at least 40 psi of  strength affected hand to improve ability to grasp and hold an object. Pts Quick DASH functional assessment score will be less than 20% functional deficit.     [facility] Portal     OT Protocols

## 2023-03-06 ENCOUNTER — OFFICE VISIT (OUTPATIENT)
Age: 58
End: 2023-03-06

## 2023-03-06 DIAGNOSIS — M25.622 STIFFNESS OF LEFT ELBOW JOINT: ICD-10-CM

## 2023-03-06 DIAGNOSIS — S46.212A RUPTURE OF LEFT DISTAL BICEPS TENDON, INITIAL ENCOUNTER: Primary | ICD-10-CM

## 2023-03-06 DIAGNOSIS — M25.522 LEFT ELBOW PAIN: ICD-10-CM

## 2023-03-06 NOTE — PROGRESS NOTES
[address] [facility]  [address] [address]  Dept: [phone]      Occupational Therapy Daily Note      Referring MD: Etelvina Bradley MD    Diagnosis:     ICD-10-CM    1. Rupture of left distal biceps tendon, initial encounter  S46.212A       2. Left elbow pain  M25.522       3. Stiffness of left elbow joint  M25.622            Surgery: Date 12/27/22 : distal biceps repair    Therapy precautions: Tendon precautions    History of injury/onset : Was installing a honeycomb component in the back of a freezer unit when he felt a pop in the elbow area. Has had the right distal biceps repaired in the past.    Total Direct Treatment Time: 50 min                       Total In Office Time: 60 min    Preferred Name:  Herbert Boone HISTORY     [address] [place] [address]:   Past Medical History:   Diagnosis Date    Diverticulosis     H/O seasonal allergies     Unspecified sleep apnea     wears cpap   ,   Past Surgical History:   Procedure Laterality Date    BICEPS TENDON REPAIR Left 12/27/2022    LEFT DISTAL BICEPS TENDON REPAIR performed by Etelvina Bradley MD at [facility]    COLONOSCOPY N/A 12/13/2017    COLONOSCOPY  BMI 31 performed by Jenn Velarde MD at [address] ([place]) FLX DX W/COLLMALLORY Up 1978 PFRMD  12/13/2017         HERNIA REPAIR      ORTHOPEDIC SURGERY Right 2009    rolled bicep    DC UNLISTED PROCEDURE ABDOMEN PERITONEUM & OMENTUM  12/2021    colon resection      Medications. : Reviewed in chart  Allergies: Allergies   Allergen Reactions    Other Nausea And Vomiting     DARVOCET        SUBJECTIVE     Pt is almost 10 weeks post-op. Pt reports he is not having any pain, he has been doing the exercises.      OBJECTIVE     Functional Outcome Measures: Quick Dash  43 score=   72.72 % functional deficit  Hand/Side Dominance: left handed  Observation/Posture: Holding UE in protected position  Palpation: Tender surgical site  Swelling/Edema: moderate L elbow  Skin Integrity: incision well healed     A/PROM Measures of Affected Extremity    DATE 1/10/23 1/18/23 2/8/23    AROM   elbow ext    45°  20 -5    AROM   elbow flex 120°  139 142    AROM   supination 80°  80 80    AROM   pronation 80°  80 80    PROM  elbow flex FULL  FULL FULL       Strength  Strength (psi): RIGHT  2/15/23 LEFT  2/15/23 LEFT  23 RIGHT  23 LEFT  3/6/23    Position II 87 57 88 98 81   Lat Pinch: 28 25      2pt pinch: 15 12      3pt pinch: 23 15         TREATMENT  Therapeutic exercise (15782) x  50 min:  Moist hot pack to left elbow x8 minutes  BRF trainin% occlusion (84): see below for details   elbow F/E through full range of motion, hold of 1# DB  30, 15, 15, 15 with 30 second rest in between   Pt perform various reps of given exercises:  Theraband flexbar press into red theraputty   Yellow theraband flexbar: sup/pro 2x15   Calibrated gripper level 2 with small foam blocks   Upgrade to HEP, pt perform while in clinic, see below       Patient was educated on blood flow restriction treatment, expectations, and post-treatment instructions. BFR contraindications: Reviewed- none noted  Patient provided verbal consent for use of BFR    Cuff size and Location Small    Arterial Occlusion Pressure 220 mmHg     Access Code: [email or web address]  URL: [email or web address]  Date: 2023  Prepared by:  Vish Avitia  Exercises  Shoulder External Rotation and Scapular Retraction with Resistance - 3 x daily - 7 x weekly - 2 sets - 20 reps  Standing Shoulder Flexion with Self-Anchored Resistance - 3 x daily - 7 x weekly - 2 sets - 20 reps  Shoulder extension with resistance - Neutral - 3 x daily - 7 x weekly - 2 sets - 20 reps  Standing Elbow Extension with Self-Anchored Resistance - 3 x daily - 7 x weekly - 2 sets - 20 reps  Standing Elbow Flexion with Self-Anchored Resistance - 3 x daily - 7 x weekly - 2 sets - 20 reps  Push Up on Table - 3 x daily - 7 x weekly - 2 sets - 15 reps  Putty Squeezes - 3 x daily - 7 x weekly - 2 sets - 20 reps      ASSESSMENT     Continued progress. Progressive strengthening of shoulder, wrist, forearm and elbow. We increased BFR occlusion to 45%. Pt will be out of town for a week next week and will return the following week. PLAN     Progressive strent  Increase weight with BFR (to 2#)     GOALS     Short term goals:  1/31/2023  (3 weeks)  Patient will be I with HEP for ROM per protocol. (MET, continue to monitor and progress)   The patient will be independent with meir/doffing elbow hinge brace. (MET)   The patient will be independent with understanding the ROM setting of the hinged elbow brace by the end of one session. (MET)   The patient will be independent with post-operative precautions in one session. (MET)   Increase A/AROM of affected elbow flexion to at least 140 ° to improve function with feeding (progressing)  Increase AROM of affected elbow extension incrementally as per protocol to improve function with reaching and donning LE dressing. (MET   Improve Quick DASH functional assessment score from 73% deficit to less than 50% deficit. (MET)      Long term goals:  3/7/2023  (8 weeks)  Pt will be able to use affected UE for all ADL's without difficulty 100% of the time. Decrease edema affected elbow to minimal. MET)   Pt will be able to sleep through the night with no pain in affected UE. (MET)  Pt will have full active elbow flexion of affected extremity to improve function with tasks like feeding and lifting. Pt will have full active elbow extension of affected extremity to improve function with tasks that require reach into cabinets and across table. (MET)   Pt will have full active forearm pronation of  affected extremity to improve ability with tasks like writing and placing/acquiring items on tabletop. Pt will have full active forearm supination of affected extremity to improve ability with tasks like opening doors, hold a plate.   Pt will be able to lift and carry items (ie grocery bags, laundry basket etc) with affected UE pain 2 of 10 or less. Pt will have at least 40 psi of  strength affected hand to improve ability to grasp and hold an object. Pts Quick DASH functional assessment score will be less than 20% functional deficit. UPDATED: 4/3/23 (4 weeks)   Pt will increase left  strength to at least 95psi. Pt will tolerate lifting and carrying items 10# or less without difficulty or pain. Pt will be independent with HEP and will increase left UE strength as evidence by ability to perform grasping, pulling and lifting for daily tasks without difficulty.      [employer] Portal     OT Protocols

## 2023-03-08 ENCOUNTER — OFFICE VISIT (OUTPATIENT)
Age: 58
End: 2023-03-08
Payer: COMMERCIAL

## 2023-03-08 DIAGNOSIS — S46.212A RUPTURE OF LEFT DISTAL BICEPS TENDON, INITIAL ENCOUNTER: Primary | ICD-10-CM

## 2023-03-08 DIAGNOSIS — M25.522 LEFT ELBOW PAIN: ICD-10-CM

## 2023-03-08 DIAGNOSIS — M25.622 STIFFNESS OF LEFT ELBOW JOINT: ICD-10-CM

## 2023-03-08 PROCEDURE — 97110 THERAPEUTIC EXERCISES: CPT | Performed by: OCCUPATIONAL THERAPIST

## 2023-03-08 NOTE — PROGRESS NOTES
[address] [facility]  [address] [address]  Dept: [phone]      Occupational Therapy Daily Note      Referring MD: Jame Huitron MD    Diagnosis:     ICD-10-CM    1. Rupture of left distal biceps tendon, initial encounter  S46.212A       2. Left elbow pain  M25.522       3. Stiffness of left elbow joint  M25.622            Surgery: Date 12/27/22 : distal biceps repair    Therapy precautions: Tendon precautions    History of injury/onset : Was installing a honeycomb component in the back of a freezer unit when he felt a pop in the elbow area. Has had the right distal biceps repaired in the past.    Total Direct Treatment Time: 55 min                       Total In Office Time: 60 min    Preferred Name:  Alphia Burkitt HISTORY     [address] [place], [place] [address]way:   Past Medical History:   Diagnosis Date    Diverticulosis     H/O seasonal allergies     Unspecified sleep apnea     wears cpap   ,   Past Surgical History:   Procedure Laterality Date    BICEPS TENDON REPAIR Left 12/27/2022    LEFT DISTAL BICEPS TENDON REPAIR performed by Jame Huitron MD at [facility]    COLONOSCOPY N/A 12/13/2017    COLONOSCOPY  BMI 31 performed by Yoly Casanova MD at [address] [address] FLX DX W/COLLMALLORY Up 1978 PFRMD  12/13/2017         HERNIA REPAIR      ORTHOPEDIC SURGERY Right 2009    rolled bicep    [place] UNLISTED PROCEDURE ABDOMEN PERITONEUM & OMENTUM  12/2021    colon resection      Medications. : Reviewed in chart  Allergies: Allergies   Allergen Reactions    Other Nausea And Vomiting     DARVOCET        SUBJECTIVE     Pt is almost 10 weeks post-op. Pt reports he is not having any pain, he has been doing the exercises.      OBJECTIVE     Functional Outcome Measures: Quick Dash  43 score=   72.72 % functional deficit  Hand/Side Dominance: left handed  Observation/Posture: Holding UE in protected position  Palpation: Tender surgical site  Swelling/Edema: moderate L elbow  Skin Integrity: incision well healed     A/PROM Measures of Affected Extremity    DATE 1/10/23 1/18/23 2/8/23 3/8/23   AROM   elbow ext    45°  20 -5 FULL   AROM   elbow flex 120°  139 142 FULL   AROM   supination 80°  80 80 FULL   AROM   pronation 80°  80 80 FULL   PROM  elbow flex FULL  FULL FULL  FULL     Strength  Strength (psi): RIGHT  2/15/23 LEFT  2/15/23 LEFT  23 RIGHT  23 LEFT  3/6/23    Position II 87 57 88 98 81   Lat Pinch: 28 25      2pt pinch: 15 12      3pt pinch: 23 15         TREATMENT  Therapeutic exercise (29473) x  55 min:  Moist hot pack to left elbow x8 minutes  UBE x4 minutes level 7 forward/backward (x2 minutes each)   BRF trainin% occlusion (76): see below for details   elbow F/E through full range of motion, hold of 2# DB  30, 15, 15, 15 with 30 second rest in between   Pt perform various reps of given exercises:   Light band:   3x15 posterior  Tricep extension   Tabletop push-ups 2x15   Theraband flexbar press into red theraputty   red theraband flexbar: sup/pro 3x15   Calibrated gripper level 2 with small foam blocks       Patient was educated on blood flow restriction treatment, expectations, and post-treatment instructions. BFR contraindications: Reviewed- none noted  Patient provided verbal consent for use of BFR    Cuff size and Location Small    Arterial Occlusion Pressure 220 mmHg     Access Code: [email or web address]  URL: [email or web address]  Date: 2023  Prepared by:  Fran Young  Exercises  Shoulder External Rotation and Scapular Retraction with Resistance - 3 x daily - 7 x weekly - 2 sets - 20 reps  Standing Shoulder Flexion with Self-Anchored Resistance - 3 x daily - 7 x weekly - 2 sets - 20 reps  Shoulder extension with resistance - Neutral - 3 x daily - 7 x weekly - 2 sets - 20 reps  Standing Elbow Extension with Self-Anchored Resistance - 3 x daily - 7 x weekly - 2 sets - 20 reps  Standing Elbow Flexion with Self-Anchored Resistance - 3 x daily - 7 x weekly - 2 sets - 20 reps  Push Up on Table - 3 x daily - 7 x weekly - 2 sets - 15 reps  Putty Squeezes - 3 x daily - 7 x weekly - 2 sets - 20 reps      ASSESSMENT     Pt continues to progress very well. Continue with strengthening regimen, decrease to 1x/week     PLAN     Progressive strent  Increase weight with BFR (to #3)     GOALS     Short term goals:  1/31/2023  (3 weeks)  Patient will be I with HEP for ROM per protocol. (MET, continue to monitor and progress)   The patient will be independent with meir/doffing elbow hinge brace. (MET)   The patient will be independent with understanding the ROM setting of the hinged elbow brace by the end of one session. (MET)   The patient will be independent with post-operative precautions in one session. (MET)   Increase A/AROM of affected elbow flexion to at least 140 ° to improve function with feeding (progressing)  Increase AROM of affected elbow extension incrementally as per protocol to improve function with reaching and donning LE dressing. (MET   Improve Quick DASH functional assessment score from 73% deficit to less than 50% deficit. (MET)      Long term goals:  3/7/2023  (8 weeks)  Pt will be able to use affected UE for all ADL's without difficulty 100% of the time. Decrease edema affected elbow to minimal. MET)   Pt will be able to sleep through the night with no pain in affected UE. (MET)  Pt will have full active elbow flexion of affected extremity to improve function with tasks like feeding and lifting. Pt will have full active elbow extension of affected extremity to improve function with tasks that require reach into cabinets and across table. (MET)   Pt will have full active forearm pronation of  affected extremity to improve ability with tasks like writing and placing/acquiring items on tabletop. (MET)   Pt will have full active forearm supination of affected extremity to improve ability with tasks like opening doors, hold a plate.   Pt will be able to lift and carry items (ie grocery bags, laundry basket etc) with affected UE pain 2 of 10 or less. Pt will have at least 40 psi of  strength affected hand to improve ability to grasp and hold an object. (MET)   Pts Quick DASH functional assessment score will be less than 20% functional deficit. UPDATED: 4/3/23 (4 weeks)   Pt will increase left  strength to at least 95psi. Pt will tolerate lifting and carrying items 10# or less without difficulty or pain. Pt will be independent with HEP and will increase left UE strength as evidence by ability to perform grasping, pulling and lifting for daily tasks without difficulty.      [employer] Portal     OT Protocols

## 2023-03-15 ENCOUNTER — OFFICE VISIT (OUTPATIENT)
Age: 58
End: 2023-03-15

## 2023-03-15 DIAGNOSIS — M25.522 LEFT ELBOW PAIN: ICD-10-CM

## 2023-03-15 DIAGNOSIS — M25.622 STIFFNESS OF LEFT ELBOW JOINT: ICD-10-CM

## 2023-03-15 DIAGNOSIS — S46.212A RUPTURE OF LEFT DISTAL BICEPS TENDON, INITIAL ENCOUNTER: Primary | ICD-10-CM

## 2023-03-15 NOTE — PROGRESS NOTES
[address] [facility]  [address] [address]  Dept: [phone]      Occupational Therapy Daily Note      Referring MD: Kwasi Wilkinson MD    Diagnosis:     ICD-10-CM    1. Rupture of left distal biceps tendon, initial encounter  S46.212A       2. Left elbow pain  M25.522       3. Stiffness of left elbow joint  M25.622            Surgery: Date 12/27/22 : distal biceps repair    Therapy precautions: Tendon precautions    History of injury/onset : Was installing a honeycomb component in the back of a freezer unit when he felt a pop in the elbow area. Has had the right distal biceps repaired in the past.    Total Direct Treatment Time: 45 min                       Total In Office Time: 60 min    Preferred Name:  Dylon Cheung HISTORY     [address] [place] [address]:   Past Medical History:   Diagnosis Date    Diverticulosis     H/O seasonal allergies     Unspecified sleep apnea     wears cpap   ,   Past Surgical History:   Procedure Laterality Date    BICEPS TENDON REPAIR Left 12/27/2022    LEFT DISTAL BICEPS TENDON REPAIR performed by Kwasi Wilkinson MD at [facility]    COLONOSCOPY N/A 12/13/2017    COLONOSCOPY  BMI 31 performed by Ashley Salas MD at [address] ([place] [address]) FLX DX W/BOZENA Up 1978 PFRMD  12/13/2017         HERNIA REPAIR      ORTHOPEDIC SURGERY Right 2009    rolled bicep    [place] UNLISTED PROCEDURE ABDOMEN PERITONEUM & OMENTUM  12/2021    colon resection      Medications. : Reviewed in chart  Allergies: Allergies   Allergen Reactions    Other Nausea And Vomiting     DARVOCET        SUBJECTIVE     Pt reports he was sore on Thursday last week from the push-ups.      OBJECTIVE     Functional Outcome Measures: Quick Dash  43 score=   72.72 % functional deficit  Hand/Side Dominance: left handed  Observation/Posture: Holding UE in protected position  Palpation: Tender surgical site  Swelling/Edema: moderate L elbow  Skin Integrity: incision well healed     A/PROM Measures of Affected Extremity    DATE 1/10/23 1/18/23 2/8/23 3/8/23   AROM   elbow ext    45°  20 -5 FULL   AROM   elbow flex 120°  139 142 FULL   AROM   supination 80°  80 80 FULL   AROM   pronation 80°  80 80 FULL   PROM  elbow flex FULL  FULL FULL  FULL     Strength  Strength (psi): RIGHT  2/15/23 LEFT  2/15/23 LEFT  2/20/23 RIGHT  2/20/23 LEFT  3/6/23 LEFT  3/15/23    Position II 87 57 88 98 81 87   Lat Pinch: 28 25       2pt pinch: 15 12       3pt pinch: 23 15          TREATMENT  Therapeutic exercise (23046) x  45 min:  Moist hot pack to left elbow x8 minutes  UBE x6 minutes level 7.5 forward/backward (x3 minutes each)   Pt perform various reps of given exercises:   Light band (yellow):   3x15 posterior row  Tricep extension   Theraband flexbar press into red theraputty   red theraband flexbar: sup/pro 3x15 with elbows extended   Calibrated gripper level 3 with small foam blocks, decrease to level 2 d/t fatigue   Bicep curl with 5# DB 3x10 with rest in between       Access Code: [email or web address]  URL: [email or web address]  Date: 03/06/2023  Prepared by: Ala Goldberg  Exercises  Shoulder External Rotation and Scapular Retraction with Resistance - 3 x daily - 7 x weekly - 2 sets - 20 reps  Standing Shoulder Flexion with Self-Anchored Resistance - 3 x daily - 7 x weekly - 2 sets - 20 reps  Shoulder extension with resistance - Neutral - 3 x daily - 7 x weekly - 2 sets - 20 reps  Standing Elbow Extension with Self-Anchored Resistance - 3 x daily - 7 x weekly - 2 sets - 20 reps  Standing Elbow Flexion with Self-Anchored Resistance - 3 x daily - 7 x weekly - 2 sets - 20 reps  Push Up on Table - 3 x daily - 7 x weekly - 2 sets - 15 reps  Putty Squeezes - 3 x daily - 7 x weekly - 2 sets - 20 reps      ASSESSMENT     Pt continues to progress very well. Strength improves each week. Follow-up with Dr. Ifeanyi Mota on 4/4. OT 1x/week for the next two weeks, will assess need for more therapy at MD follow-up.      PLAN Progressive strengthening       GOALS     Short term goals:  1/31/2023  (3 weeks)  Patient will be I with HEP for ROM per protocol. (MET, continue to monitor and progress)   The patient will be independent with meir/doffing elbow hinge brace. (MET)   The patient will be independent with understanding the ROM setting of the hinged elbow brace by the end of one session. (MET)   The patient will be independent with post-operative precautions in one session. (MET)   Increase A/AROM of affected elbow flexion to at least 140 ° to improve function with feeding (progressing)  Increase AROM of affected elbow extension incrementally as per protocol to improve function with reaching and donning LE dressing. (MET   Improve Quick DASH functional assessment score from 73% deficit to less than 50% deficit. (MET)      Long term goals:  3/7/2023  (8 weeks)  Pt will be able to use affected UE for all ADL's without difficulty 100% of the time. Decrease edema affected elbow to minimal. MET)   Pt will be able to sleep through the night with no pain in affected UE. (MET)  Pt will have full active elbow flexion of affected extremity to improve function with tasks like feeding and lifting. Pt will have full active elbow extension of affected extremity to improve function with tasks that require reach into cabinets and across table. (MET)   Pt will have full active forearm pronation of  affected extremity to improve ability with tasks like writing and placing/acquiring items on tabletop. (MET)   Pt will have full active forearm supination of affected extremity to improve ability with tasks like opening doors, hold a plate. Pt will be able to lift and carry items (ie grocery bags, laundry basket etc) with affected UE pain 2 of 10 or less.   Pt will have at least 40 psi of  strength affected hand to improve ability to grasp and hold an object. (MET)   Pts Quick DASH functional assessment score will be less than 20% functional deficit. UPDATED: 4/3/23 (4 weeks)   Pt will increase left  strength to at least 95psi. Pt will tolerate lifting and carrying items 10# or less without difficulty or pain. Pt will be independent with HEP and will increase left UE strength as evidence by ability to perform grasping, pulling and lifting for daily tasks without difficulty.      [employer] Portal     OT Protocols

## 2023-03-21 NOTE — PROGRESS NOTES
[address] [facility]  [address] [address]  Dept: [phone]      Occupational Therapy Daily Note      Referring MD: Sachi Eubanks MD    Diagnosis:     ICD-10-CM    1. Rupture of left distal biceps tendon, initial encounter  S46.212A       2. Left elbow pain  M25.522       3. Stiffness of left elbow joint  M25.622            Surgery: Date 12/27/22 : distal biceps repair    Therapy precautions: Tendon precautions    History of injury/onset : Was installing a honeycomb component in the back of a freezer unit when he felt a pop in the elbow area. Has had the right distal biceps repaired in the past.    Total Direct Treatment Time: 45 min                       Total In Office Time: 60 min    Preferred Name:  Pepper Sprout HISTORY     [address] [place], [place] [address]:   Past Medical History:   Diagnosis Date    Diverticulosis     H/O seasonal allergies     Unspecified sleep apnea     wears cpap   ,   Past Surgical History:   Procedure Laterality Date    BICEPS TENDON REPAIR Left 12/27/2022    LEFT DISTAL BICEPS TENDON REPAIR performed by Sachi Eubanks MD at [facility]    COLONOSCOPY N/A 12/13/2017    COLONOSCOPY  BMI 31 performed by Angelica Ellis MD at [address] ([place] [address]) FLX DX W/COLLMALLORY Up 1978 PFRMD  12/13/2017         HERNIA REPAIR      ORTHOPEDIC SURGERY Right 2009    rolled bicep    [place] UNLISTED PROCEDURE ABDOMEN PERITONEUM & OMENTUM  12/2021    colon resection      Medications. : Reviewed in chart  Allergies: Allergies   Allergen Reactions    Other Nausea And Vomiting     DARVOCET        SUBJECTIVE     Pt reports that he tripped/fell onto his right arm and was very sore on Sunday. He states it has resolved since then. He states he still does not have a lot of strength turning his arm (palm up).      OBJECTIVE     Functional Outcome Measures: Quick Dash  43 score=   72.72 % functional deficit  Hand/Side Dominance: left handed  Observation/Posture: Holding UE in protected

## 2023-03-22 ENCOUNTER — OFFICE VISIT (OUTPATIENT)
Age: 58
End: 2023-03-22

## 2023-03-22 DIAGNOSIS — M25.622 STIFFNESS OF LEFT ELBOW JOINT: ICD-10-CM

## 2023-03-22 DIAGNOSIS — S46.212A RUPTURE OF LEFT DISTAL BICEPS TENDON, INITIAL ENCOUNTER: Primary | ICD-10-CM

## 2023-03-22 DIAGNOSIS — M25.522 LEFT ELBOW PAIN: ICD-10-CM

## 2023-03-29 ENCOUNTER — OFFICE VISIT (OUTPATIENT)
Age: 58
End: 2023-03-29

## 2023-03-29 DIAGNOSIS — S46.212A RUPTURE OF LEFT DISTAL BICEPS TENDON, INITIAL ENCOUNTER: Primary | ICD-10-CM

## 2023-03-29 DIAGNOSIS — M25.522 LEFT ELBOW PAIN: ICD-10-CM

## 2023-03-29 DIAGNOSIS — M25.622 STIFFNESS OF LEFT ELBOW JOINT: ICD-10-CM

## 2023-03-29 DIAGNOSIS — R29.898 DECREASED GRIP STRENGTH: ICD-10-CM

## 2023-04-04 ENCOUNTER — OFFICE VISIT (OUTPATIENT)
Dept: ORTHOPEDIC SURGERY | Age: 58
End: 2023-04-04

## 2023-04-04 DIAGNOSIS — S46.212A RUPTURE OF LEFT DISTAL BICEPS TENDON, INITIAL ENCOUNTER: Primary | ICD-10-CM

## 2023-04-04 NOTE — LETTER
April 4, 2023       Xenia Caitlin. YOB: 1965   [address] [place] [phone] Date of Visit:  4/4/2023       To Whom It May Concern: It is my medical opinion that Millie Sample {Work release (duty restriction):30666}. If you have any questions or concerns, please don't hesitate to call.     Sincerely,        Gary Rob MD

## 2023-04-04 NOTE — PROGRESS NOTES
Orthopaedic Hand Surgery Note    Name: Jace Pink Age: 56 y.o. YOB: 1965  Gender: male  MRN: [id number]    Post Operative Visit: Left Distal Biceps Tendon Repair - Left    HPI: Patient is status post Left Distal Biceps Tendon Repair - Left on 12/27/2022. Patient reports no pain, has been able to resume all activities, he still has some weakness. Physical Examination:  Well-healed surgical wounds. Sensation is intact in all fingers. Motor exam reveals no deficits. Good function of distal biceps, good supination strength although significantly decreased compared to contralateral side. Imaging:     none    Assessment:   1. Rupture of left distal biceps tendon, initial encounter         Status post Left Distal Biceps Tendon Repair - Left on 12/27/2022    Plan:  We discussed the post operative course and progression. Overall time of this visit take into account reviewing the chart, face-to-face time with the patient counseling on his condition as well as documentation after chart was over 30 minutes. .  Activities tolerated, back to work without restrictions, at this point the patient has reached MMI, he is very happy with progress, he still has some weakness, according to the [facility] guides to the evaluation from impairment sixth edition patient has a left upper extremity.   Of 3% (table 15-4, page 399)    Eddy Essex, MD  04/04/23  3:13 PM

## 2023-04-05 ENCOUNTER — OFFICE VISIT (OUTPATIENT)
Age: 58
End: 2023-04-05

## 2023-04-05 DIAGNOSIS — M25.522 LEFT ELBOW PAIN: ICD-10-CM

## 2023-04-05 DIAGNOSIS — M25.622 STIFFNESS OF LEFT ELBOW JOINT: ICD-10-CM

## 2023-04-05 DIAGNOSIS — S46.212A RUPTURE OF LEFT DISTAL BICEPS TENDON, INITIAL ENCOUNTER: Primary | ICD-10-CM

## 2023-04-05 DIAGNOSIS — R29.898 DECREASED GRIP STRENGTH: ICD-10-CM

## 2023-04-05 NOTE — PROGRESS NOTES
weekly - 2 sets - 20 reps  Push Up on Table - 3 x daily - 7 x weekly - 2 sets - 15 reps  Putty Squeezes - 3 x daily - 7 x weekly - 2 sets - 20 reps      ASSESSMENT   Pt has greatly improved over the course of therapy on his left bicep repair. He has met most of his goals and has returned mostly to baseline level of strength and mobility in LUE. Pt educated on continuation of exercises outside of formal therapy, he will follow-up if needed     PLAN     Discharge from formal therapy       GOALS     Short term goals:  1/31/2023  (3 weeks)  Patient will be I with HEP for ROM per protocol. (MET, continue to monitor and progress)   The patient will be independent with meir/doffing elbow hinge brace. (MET)   The patient will be independent with understanding the ROM setting of the hinged elbow brace by the end of one session. (MET)   The patient will be independent with post-operative precautions in one session. (MET)   Increase A/AROM of affected elbow flexion to at least 140 ° to improve function with feeding (progressing)  Increase AROM of affected elbow extension incrementally as per protocol to improve function with reaching and donning LE dressing. (MET   Improve Quick DASH functional assessment score from 73% deficit to less than 50% deficit. (MET)      Long term goals:  3/7/2023  (8 weeks)  Pt will be able to use affected UE for all ADL's without difficulty 100% of the time. Decrease edema affected elbow to minimal. MET)   Pt will be able to sleep through the night with no pain in affected UE. (MET)  Pt will have full active elbow flexion of affected extremity to improve function with tasks like feeding and lifting.    Pt will have full active elbow extension of affected extremity to improve function with tasks that require reach into cabinets and across table. (MET)   Pt will have full active forearm pronation of  affected extremity to improve ability with tasks like writing and placing/acquiring items on

## 2023-10-12 ENCOUNTER — OFFICE VISIT (OUTPATIENT)
Dept: SLEEP MEDICINE | Age: 58
End: 2023-10-12
Payer: COMMERCIAL

## 2023-10-12 VITALS
WEIGHT: 217 LBS | BODY MASS INDEX: 34.06 KG/M2 | HEART RATE: 78 BPM | SYSTOLIC BLOOD PRESSURE: 118 MMHG | OXYGEN SATURATION: 95 % | TEMPERATURE: 97.5 F | DIASTOLIC BLOOD PRESSURE: 80 MMHG | RESPIRATION RATE: 15 BRPM | HEIGHT: 67 IN

## 2023-10-12 DIAGNOSIS — G47.33 OSA ON CPAP: Primary | ICD-10-CM

## 2023-10-12 DIAGNOSIS — G47.10 HYPERSOMNIA: ICD-10-CM

## 2023-10-12 DIAGNOSIS — E66.9 OBESITY (BMI 30.0-34.9): ICD-10-CM

## 2023-10-12 DIAGNOSIS — G47.34 NOCTURNAL HYPOXEMIA: ICD-10-CM

## 2023-10-12 DIAGNOSIS — Z78.9 DIFFICULTY WITH CPAP USE: ICD-10-CM

## 2023-10-12 PROCEDURE — 99214 OFFICE O/P EST MOD 30 MIN: CPT | Performed by: INTERNAL MEDICINE

## 2023-10-12 ASSESSMENT — SLEEP AND FATIGUE QUESTIONNAIRES
HOW LIKELY ARE YOU TO NOD OFF OR FALL ASLEEP WHILE SITTING AND READING: 3
ESS TOTAL SCORE: 17
HOW LIKELY ARE YOU TO NOD OFF OR FALL ASLEEP WHILE LYING DOWN TO REST IN THE AFTERNOON WHEN CIRCUMSTANCES PERMIT: 2
HOW LIKELY ARE YOU TO NOD OFF OR FALL ASLEEP WHILE SITTING AND TALKING TO SOMEONE: 1
HOW LIKELY ARE YOU TO NOD OFF OR FALL ASLEEP IN A CAR, WHILE STOPPED FOR A FEW MINUTES IN TRAFFIC: 2
HOW LIKELY ARE YOU TO NOD OFF OR FALL ASLEEP WHILE SITTING INACTIVE IN A PUBLIC PLACE: 3
HOW LIKELY ARE YOU TO NOD OFF OR FALL ASLEEP WHEN YOU ARE A PASSENGER IN A CAR FOR AN HOUR WITHOUT A BREAK: 3
HOW LIKELY ARE YOU TO NOD OFF OR FALL ASLEEP WHILE SITTING QUIETLY AFTER LUNCH WITHOUT ALCOHOL: 1
HOW LIKELY ARE YOU TO NOD OFF OR FALL ASLEEP WHILE WATCHING TV: 2

## 2023-10-12 NOTE — PATIENT INSTRUCTIONS
What is a Home Sleep Test?    A home sleep apnea test is just as it sounds; a sleep test your patients can take from their own home to determine if they have sleep apnea. Your patient may come into the sleep center to  equipment for their home sleep test. Cari Clark demonstrate to them how to perform the test, including how to use the machine and equipment properly. The patient goes home and performs the test.  The data obtained is stored on the equipment and is uploaded at the sleep center the following day. Home sleep tests are designed to provide your patients with a convenient way to get the test done while allowing you to collect important information on how they sleep and make a diagnosis of PAULO. A home sleep test comes with devices that your patient can easily apply themselves and includes:    A belt that measures respiratory effort and is placed around the upper chest.    A small nasal cannula that measures airflow    A finger clip that measures blood oxygen saturation      What is the process and what happens during a Home Sleep Test?    On the day your patient plans on taking the test, they should:    Avoid napping    Follow their normal routine as much as they can. Avoid caffeine after they eat lunch. They should let you know if they're taking regular medication, since the physician may want to have them discontinue it temporarily, depending on what it is. Before the patient's home sleep test, they'll come by sleep clinic to  the equipment. They can also opt to have hit delivered to their home. Provide them with instructions on how to use the home test and equipment and be ready for any questions they may have on things they don't understand. Let them know they can go to bed at their regular time. When they're ready to go to sleep, they'll attach the sensors to their body like you instructed.   They may need to press a button on the device when they're getting into bed,

## 2023-10-17 ENCOUNTER — TELEPHONE (OUTPATIENT)
Dept: ORTHOPEDIC SURGERY | Age: 58
End: 2023-10-17

## 2023-11-03 ENCOUNTER — HOSPITAL ENCOUNTER (OUTPATIENT)
Dept: SLEEP CENTER | Age: 58
Discharge: HOME | End: 2023-11-06
Payer: COMMERCIAL

## 2023-11-03 PROCEDURE — 95806 SLEEP STUDY UNATT&RESP EFFT: CPT

## 2023-12-08 ENCOUNTER — NURSE ONLY (OUTPATIENT)
Dept: FAMILY MEDICINE CLINIC | Facility: CLINIC | Age: 58
End: 2023-12-08

## 2023-12-08 DIAGNOSIS — Z13.29 SCREENING FOR THYROID DISORDER: ICD-10-CM

## 2023-12-08 DIAGNOSIS — Z12.5 SCREENING PSA (PROSTATE SPECIFIC ANTIGEN): ICD-10-CM

## 2023-12-08 DIAGNOSIS — E78.5 HYPERLIPIDEMIA, UNSPECIFIED HYPERLIPIDEMIA TYPE: ICD-10-CM

## 2023-12-08 DIAGNOSIS — Z13.0 SCREENING FOR IRON DEFICIENCY ANEMIA: ICD-10-CM

## 2023-12-08 DIAGNOSIS — Z00.00 ANNUAL PHYSICAL EXAM: ICD-10-CM

## 2023-12-08 DIAGNOSIS — Z13.89 SCREENING FOR BLOOD OR PROTEIN IN URINE: ICD-10-CM

## 2023-12-08 DIAGNOSIS — Z13.228 SCREENING FOR METABOLIC DISORDER: ICD-10-CM

## 2023-12-08 DIAGNOSIS — Z00.00 ANNUAL PHYSICAL EXAM: Primary | ICD-10-CM

## 2023-12-08 LAB
ALBUMIN SERPL-MCNC: 3.8 G/DL (ref 3.5–5)
ALBUMIN/GLOB SERPL: 1.1 (ref 0.4–1.6)
ALP SERPL-CCNC: 63 U/L (ref 50–136)
ALT SERPL-CCNC: 45 U/L (ref 12–65)
ANION GAP SERPL CALC-SCNC: 5 MMOL/L (ref 2–11)
APPEARANCE UR: CLEAR
AST SERPL-CCNC: 28 U/L (ref 15–37)
BACTERIA URNS QL MICRO: NEGATIVE /HPF
BASOPHILS # BLD: 0.1 K/UL (ref 0–0.2)
BASOPHILS NFR BLD: 1 % (ref 0–2)
BILIRUB SERPL-MCNC: 0.5 MG/DL (ref 0.2–1.1)
BILIRUB UR QL: NEGATIVE
BUN SERPL-MCNC: 11 MG/DL (ref 6–23)
CALCIUM SERPL-MCNC: 9.5 MG/DL (ref 8.3–10.4)
CASTS URNS QL MICRO: NORMAL /LPF (ref 0–2)
CHLORIDE SERPL-SCNC: 108 MMOL/L (ref 103–113)
CHOLEST SERPL-MCNC: 238 MG/DL
CO2 SERPL-SCNC: 28 MMOL/L (ref 21–32)
COLOR UR: NORMAL
CREAT SERPL-MCNC: 1 MG/DL (ref 0.8–1.5)
DIFFERENTIAL METHOD BLD: NORMAL
EOSINOPHIL # BLD: 0.3 K/UL (ref 0–0.8)
EOSINOPHIL NFR BLD: 3 % (ref 0.5–7.8)
EPI CELLS #/AREA URNS HPF: NORMAL /HPF (ref 0–5)
ERYTHROCYTE [DISTWIDTH] IN BLOOD BY AUTOMATED COUNT: 12.3 % (ref 11.9–14.6)
GLOBULIN SER CALC-MCNC: 3.6 G/DL (ref 2.8–4.5)
GLUCOSE SERPL-MCNC: 91 MG/DL (ref 65–100)
GLUCOSE UR STRIP.AUTO-MCNC: NEGATIVE MG/DL
HCT VFR BLD AUTO: 44.4 % (ref 41.1–50.3)
HDLC SERPL-MCNC: 48 MG/DL (ref 40–60)
HDLC SERPL: 5
HGB BLD-MCNC: 14.9 G/DL (ref 13.6–17.2)
HGB UR QL STRIP: NEGATIVE
IMM GRANULOCYTES # BLD AUTO: 0 K/UL (ref 0–0.5)
IMM GRANULOCYTES NFR BLD AUTO: 0 % (ref 0–5)
KETONES UR QL STRIP.AUTO: NEGATIVE MG/DL
LDLC SERPL CALC-MCNC: 148.6 MG/DL
LEUKOCYTE ESTERASE UR QL STRIP.AUTO: NEGATIVE
LYMPHOCYTES # BLD: 2.3 K/UL (ref 0.5–4.6)
LYMPHOCYTES NFR BLD: 31 % (ref 13–44)
MCH RBC QN AUTO: 31 PG (ref 26.1–32.9)
MCHC RBC AUTO-ENTMCNC: 33.6 G/DL (ref 31.4–35)
MCV RBC AUTO: 92.3 FL (ref 82–102)
MONOCYTES # BLD: 0.4 K/UL (ref 0.1–1.3)
MONOCYTES NFR BLD: 6 % (ref 4–12)
MUCOUS THREADS URNS QL MICRO: 0 /LPF
NEUTS SEG # BLD: 4.3 K/UL (ref 1.7–8.2)
NEUTS SEG NFR BLD: 59 % (ref 43–78)
NITRITE UR QL STRIP.AUTO: NEGATIVE
NRBC # BLD: 0 K/UL (ref 0–0.2)
PH UR STRIP: 7.5 (ref 5–9)
PLATELET # BLD AUTO: 265 K/UL (ref 150–450)
PMV BLD AUTO: 10.5 FL (ref 9.4–12.3)
POTASSIUM SERPL-SCNC: 4.3 MMOL/L (ref 3.5–5.1)
PROT SERPL-MCNC: 7.4 G/DL (ref 6.3–8.2)
PROT UR STRIP-MCNC: NEGATIVE MG/DL
PSA SERPL-MCNC: 2.7 NG/ML
RBC # BLD AUTO: 4.81 M/UL (ref 4.23–5.6)
RBC #/AREA URNS HPF: NORMAL /HPF (ref 0–5)
SODIUM SERPL-SCNC: 141 MMOL/L (ref 136–146)
SP GR UR REFRACTOMETRY: 1.01 (ref 1–1.02)
TRIGL SERPL-MCNC: 207 MG/DL (ref 35–150)
TSH, 3RD GENERATION: 1.37 UIU/ML (ref 0.36–3.74)
URINE CULTURE IF INDICATED: NORMAL
UROBILINOGEN UR QL STRIP.AUTO: 0.2 EU/DL (ref 0.2–1)
VLDLC SERPL CALC-MCNC: 41.4 MG/DL (ref 6–23)
WBC # BLD AUTO: 7.4 K/UL (ref 4.3–11.1)
WBC URNS QL MICRO: NORMAL /HPF (ref 0–4)

## 2023-12-11 ENCOUNTER — OFFICE VISIT (OUTPATIENT)
Dept: FAMILY MEDICINE CLINIC | Facility: CLINIC | Age: 58
End: 2023-12-11
Payer: COMMERCIAL

## 2023-12-11 VITALS
HEIGHT: 67 IN | OXYGEN SATURATION: 97 % | SYSTOLIC BLOOD PRESSURE: 120 MMHG | HEART RATE: 88 BPM | WEIGHT: 217.6 LBS | BODY MASS INDEX: 34.15 KG/M2 | TEMPERATURE: 98.1 F | DIASTOLIC BLOOD PRESSURE: 85 MMHG

## 2023-12-11 DIAGNOSIS — Z00.00 ANNUAL PHYSICAL EXAM: ICD-10-CM

## 2023-12-11 DIAGNOSIS — G47.33 OBSTRUCTIVE SLEEP APNEA (ADULT) (PEDIATRIC): ICD-10-CM

## 2023-12-11 DIAGNOSIS — Z87.19 H/O DIVERTICULITIS OF COLON: ICD-10-CM

## 2023-12-11 DIAGNOSIS — E78.5 HYPERLIPIDEMIA, UNSPECIFIED HYPERLIPIDEMIA TYPE: ICD-10-CM

## 2023-12-11 DIAGNOSIS — J30.1 SEASONAL ALLERGIC RHINITIS DUE TO POLLEN: ICD-10-CM

## 2023-12-11 DIAGNOSIS — J01.90 ACUTE SINUSITIS, RECURRENCE NOT SPECIFIED, UNSPECIFIED LOCATION: Primary | ICD-10-CM

## 2023-12-11 DIAGNOSIS — G47.33 OSA ON CPAP: ICD-10-CM

## 2023-12-11 PROCEDURE — 96372 THER/PROPH/DIAG INJ SC/IM: CPT | Performed by: FAMILY MEDICINE

## 2023-12-11 PROCEDURE — 99396 PREV VISIT EST AGE 40-64: CPT | Performed by: FAMILY MEDICINE

## 2023-12-11 RX ORDER — METHYLPREDNISOLONE SODIUM SUCCINATE 40 MG/ML
40 INJECTION, POWDER, LYOPHILIZED, FOR SOLUTION INTRAMUSCULAR; INTRAVENOUS ONCE
Status: COMPLETED | OUTPATIENT
Start: 2023-12-11 | End: 2023-12-11

## 2023-12-11 RX ADMIN — METHYLPREDNISOLONE SODIUM SUCCINATE 40 MG: 40 INJECTION, POWDER, LYOPHILIZED, FOR SOLUTION INTRAMUSCULAR; INTRAVENOUS at 13:32

## 2023-12-12 RX ORDER — METHYLPREDNISOLONE 4 MG/1
TABLET ORAL
Qty: 1 KIT | Refills: 0 | Status: SHIPPED | OUTPATIENT
Start: 2023-12-12 | End: 2023-12-17

## 2024-01-04 ASSESSMENT — PATIENT HEALTH QUESTIONNAIRE - PHQ9
2. FEELING DOWN, DEPRESSED OR HOPELESS: 0
SUM OF ALL RESPONSES TO PHQ QUESTIONS 1-9: 0
SUM OF ALL RESPONSES TO PHQ QUESTIONS 1-9: 0
SUM OF ALL RESPONSES TO PHQ9 QUESTIONS 1 & 2: 0
SUM OF ALL RESPONSES TO PHQ9 QUESTIONS 1 & 2: 0
1. LITTLE INTEREST OR PLEASURE IN DOING THINGS: NOT AT ALL
SUM OF ALL RESPONSES TO PHQ QUESTIONS 1-9: 0
2. FEELING DOWN, DEPRESSED OR HOPELESS: NOT AT ALL
1. LITTLE INTEREST OR PLEASURE IN DOING THINGS: 0
SUM OF ALL RESPONSES TO PHQ QUESTIONS 1-9: 0

## 2024-01-05 ENCOUNTER — OFFICE VISIT (OUTPATIENT)
Dept: FAMILY MEDICINE CLINIC | Facility: CLINIC | Age: 59
End: 2024-01-05
Payer: COMMERCIAL

## 2024-01-05 VITALS
TEMPERATURE: 97.9 F | HEIGHT: 67 IN | HEART RATE: 78 BPM | OXYGEN SATURATION: 98 % | DIASTOLIC BLOOD PRESSURE: 74 MMHG | WEIGHT: 221 LBS | SYSTOLIC BLOOD PRESSURE: 128 MMHG | BODY MASS INDEX: 34.69 KG/M2

## 2024-01-05 DIAGNOSIS — R21 RASH: Primary | ICD-10-CM

## 2024-01-05 DIAGNOSIS — R09.81 NASAL CONGESTION: ICD-10-CM

## 2024-01-05 DIAGNOSIS — R07.9 CHEST PAIN, UNSPECIFIED TYPE: ICD-10-CM

## 2024-01-05 PROCEDURE — 99214 OFFICE O/P EST MOD 30 MIN: CPT | Performed by: NURSE PRACTITIONER

## 2024-01-05 PROCEDURE — 93000 ELECTROCARDIOGRAM COMPLETE: CPT | Performed by: NURSE PRACTITIONER

## 2024-01-05 ASSESSMENT — ENCOUNTER SYMPTOMS
NAUSEA: 0
VOMITING: 0
EYE REDNESS: 0
SORE THROAT: 0
BLOOD IN STOOL: 0
SHORTNESS OF BREATH: 0
CONSTIPATION: 0
EYE PAIN: 0
COUGH: 0
BACK PAIN: 0
DIARRHEA: 0
SINUS PAIN: 0

## 2024-01-05 NOTE — PROGRESS NOTES
Eleazar Avery Jr. (:  1965) is a 58 y.o. male,Established patient, here for evaluation of the following chief complaint(s):  Chest Pain (Tuesday left side hurt to breathe in/Almost felt like indigestion without the burn/Felt like he could not take a deep breath)         ASSESSMENT/PLAN:  1. Rash  -     AFL - Chinedu Amin MD, [place]  2. Chest pain, unspecified type  -     EKG 12 Lead  3. Nasal congestion    Reassurance provided.  Mucinex, Flonase and an allergy pill encouraged for symptom management.  Referral to derm placed.  Return precautions given.  Offered trial of medrol but he defers for now.    Return if symptoms worsen or fail to improve.         Subjective   SUBJECTIVE/OBJECTIVE:  Rash  This is a recurrent problem. The current episode started more than 1 month ago. The problem has been gradually worsening since onset. The affected locations include the left elbow. The rash is characterized by itchiness. He was exposed to nothing. Associated symptoms include congestion. Pertinent negatives include no cough, diarrhea, eye pain, fever, shortness of breath, sore throat or vomiting. Treatments tried: medrol dose pack. The treatment provided moderate (but symptoms returned) relief.     Left-sided chest pain.  Began over the weekend.  Most recent episode was Tuesday of this week.  Aggravated by deep breathing.  Associated indigestion, dizziness and nasal congestion.  Denies SOB, HA and swelling.  States he does not do repetitive movements at work or home but thinks he could have slept on his side wrong.  Also denies other URI symptoms including cough, sinus pain and sore throat.  Afebrile.  Using nasal spray at home with mild relief but had a nosebleed this morning.    Review of Systems   Constitutional:  Negative for chills and fever.   HENT:  Positive for congestion. Negative for ear pain, sinus pain and sore throat.    Eyes:  Negative for pain and redness.   Respiratory:  Negative for cough

## 2024-01-10 DIAGNOSIS — R97.20 ELEVATED PSA: Primary | ICD-10-CM

## 2024-01-11 ENCOUNTER — NURSE ONLY (OUTPATIENT)
Dept: FAMILY MEDICINE CLINIC | Facility: CLINIC | Age: 59
End: 2024-01-11

## 2024-01-11 DIAGNOSIS — R97.20 ELEVATED PSA: ICD-10-CM

## 2024-01-11 LAB — PSA SERPL-MCNC: 2.7 NG/ML

## 2024-03-01 ENCOUNTER — TELEPHONE (OUTPATIENT)
Dept: SLEEP MEDICINE | Age: 59
End: 2024-03-01

## 2024-03-01 NOTE — PROGRESS NOTES
Referral Priority:   Routine     Referral Type:   Eval and Treat     Referral Reason:   Specialty Services Required     Requested Specialty:   Dentistry     Number of Visits Requested:   1             Collaborating Physician: Dr. Bolton    Today's office visit was spent  reviewing test results, prognosis, importance of compliance, education about disease process, benefits of medications, instructions for management of acute flare-ups, and follow up plans.  Total time spent with patient was 20 minutes.        Demetrius Javier, APRN - CNP  Electronically signed

## 2024-03-04 ENCOUNTER — TELEPHONE (OUTPATIENT)
Dept: SLEEP MEDICINE | Age: 59
End: 2024-03-04

## 2024-03-04 ENCOUNTER — OFFICE VISIT (OUTPATIENT)
Dept: SLEEP MEDICINE | Age: 59
End: 2024-03-04
Payer: COMMERCIAL

## 2024-03-04 VITALS
WEIGHT: 221 LBS | OXYGEN SATURATION: 96 % | TEMPERATURE: 97.2 F | HEART RATE: 77 BPM | DIASTOLIC BLOOD PRESSURE: 80 MMHG | HEIGHT: 67 IN | BODY MASS INDEX: 34.69 KG/M2 | SYSTOLIC BLOOD PRESSURE: 120 MMHG

## 2024-03-04 DIAGNOSIS — E66.9 OBESITY (BMI 30.0-34.9): ICD-10-CM

## 2024-03-04 DIAGNOSIS — G47.34 NOCTURNAL HYPOXEMIA: ICD-10-CM

## 2024-03-04 DIAGNOSIS — G47.33 OSA (OBSTRUCTIVE SLEEP APNEA): Primary | ICD-10-CM

## 2024-03-04 PROCEDURE — 99213 OFFICE O/P EST LOW 20 MIN: CPT | Performed by: NURSE PRACTITIONER

## 2024-03-04 ASSESSMENT — SLEEP AND FATIGUE QUESTIONNAIRES
ESS TOTAL SCORE: 9
HOW LIKELY ARE YOU TO NOD OFF OR FALL ASLEEP WHILE SITTING QUIETLY AFTER LUNCH WITHOUT ALCOHOL: 0
HOW LIKELY ARE YOU TO NOD OFF OR FALL ASLEEP WHILE WATCHING TV: 3
HOW LIKELY ARE YOU TO NOD OFF OR FALL ASLEEP WHILE LYING DOWN TO REST IN THE AFTERNOON WHEN CIRCUMSTANCES PERMIT: 0
HOW LIKELY ARE YOU TO NOD OFF OR FALL ASLEEP WHILE SITTING AND TALKING TO SOMEONE: 0
HOW LIKELY ARE YOU TO NOD OFF OR FALL ASLEEP WHEN YOU ARE A PASSENGER IN A CAR FOR AN HOUR WITHOUT A BREAK: 3
HOW LIKELY ARE YOU TO NOD OFF OR FALL ASLEEP WHILE SITTING INACTIVE IN A PUBLIC PLACE: 0
HOW LIKELY ARE YOU TO NOD OFF OR FALL ASLEEP WHILE SITTING AND READING: 3
HOW LIKELY ARE YOU TO NOD OFF OR FALL ASLEEP IN A CAR, WHILE STOPPED FOR A FEW MINUTES IN TRAFFIC: 0

## 2024-03-04 NOTE — PATIENT INSTRUCTIONS
Referral to dentistry for oral appliance for mild PAULO  Recommendations as above  Follow-up in 6 months or sooner if needed

## 2024-06-12 ENCOUNTER — OFFICE VISIT (OUTPATIENT)
Dept: FAMILY MEDICINE CLINIC | Facility: CLINIC | Age: 59
End: 2024-06-12
Payer: COMMERCIAL

## 2024-06-12 VITALS
TEMPERATURE: 98.6 F | SYSTOLIC BLOOD PRESSURE: 136 MMHG | HEIGHT: 67 IN | DIASTOLIC BLOOD PRESSURE: 84 MMHG | HEART RATE: 88 BPM | BODY MASS INDEX: 32.96 KG/M2 | WEIGHT: 210 LBS | OXYGEN SATURATION: 97 %

## 2024-06-12 DIAGNOSIS — R97.20 ELEVATED PSA: Primary | ICD-10-CM

## 2024-06-12 LAB — PSA SERPL-MCNC: 2.1 NG/ML (ref 0–4)

## 2024-06-12 PROCEDURE — 99213 OFFICE O/P EST LOW 20 MIN: CPT | Performed by: FAMILY MEDICINE

## 2024-06-12 SDOH — ECONOMIC STABILITY: FOOD INSECURITY: WITHIN THE PAST 12 MONTHS, THE FOOD YOU BOUGHT JUST DIDN'T LAST AND YOU DIDN'T HAVE MONEY TO GET MORE.: NEVER TRUE

## 2024-06-12 SDOH — ECONOMIC STABILITY: HOUSING INSECURITY
IN THE LAST 12 MONTHS, WAS THERE A TIME WHEN YOU DID NOT HAVE A STEADY PLACE TO SLEEP OR SLEPT IN A SHELTER (INCLUDING NOW)?: NO

## 2024-06-12 SDOH — ECONOMIC STABILITY: FOOD INSECURITY: WITHIN THE PAST 12 MONTHS, YOU WORRIED THAT YOUR FOOD WOULD RUN OUT BEFORE YOU GOT MONEY TO BUY MORE.: NEVER TRUE

## 2024-06-12 SDOH — ECONOMIC STABILITY: INCOME INSECURITY: HOW HARD IS IT FOR YOU TO PAY FOR THE VERY BASICS LIKE FOOD, HOUSING, MEDICAL CARE, AND HEATING?: NOT HARD AT ALL

## 2024-06-25 ASSESSMENT — ENCOUNTER SYMPTOMS
GASTROINTESTINAL NEGATIVE: 1
RESPIRATORY NEGATIVE: 1
EYES NEGATIVE: 1

## 2024-06-25 NOTE — PROGRESS NOTES
HISTORY OF PRESENT ILLNESS  Eleazar Avery Jr. is a 58 y.o. y.o. male    Elevated PSA  This is a new (12/2023 LEVEL JUMPED FROM 1.6 TO 2.7 , ASYMPTOMATIC , REPEAT 1 MONTH LATER PSA STILL 2.7 , HERE TODAY TO REPEAT) problem.  Prostate specific antigen trend is stable.      Allergies   Allergen Reactions    Other Nausea And Vomiting     DARVOCET        Current Outpatient Medications   Medication Sig    loratadine (CLARITIN) 10 MG tablet Take 1 tablet by mouth daily     No current facility-administered medications for this visit.        Past Medical History:   Diagnosis Date    Diverticulosis     H/O seasonal allergies     Unspecified sleep apnea     wears cpap        Past Surgical History:   Procedure Laterality Date    BICEPS TENDON REPAIR Left 12/27/2022    LEFT DISTAL BICEPS TENDON REPAIR performed by Alda Hernández MD at [facility]    COLONOSCOPY N/A 12/13/2017    COLONOSCOPY  BMI 31 [place] [facility] [facility]    COLONOSCOPY FLX DX W/COLLJ SPEC WHEN PFRMD  12/13/2017         HERNIA REPAIR      ORTHOPEDIC SURGERY Right 2009    rolled bicep    TN UNLISTED PROCEDURE ABDOMEN PERITONEUM & OMENTUM  12/2021    colon resection        Social History     Socioeconomic History    Marital status:      Spouse name: Not on file    Number of children: Not on file    Years of education: Not on file    Highest education level: Not on file   Occupational History    Not on file   Tobacco Use    Smoking status: Never    Smokeless tobacco: Never   Vaping Use    Vaping Use: Never used   Substance and Sexual Activity    Alcohol use: No    Drug use: No    Sexual activity: Not on file   Other Topics Concern    Not on file   Social History Narrative    Not on file     Social Determinants of Health     Financial Resource Strain: Low Risk  (6/12/2024)    Overall Financial Resource Strain (CARDIA)     Difficulty of Paying Living Expenses: Not hard at all   Food Insecurity: No Food Insecurity (6/12/2024)

## 2024-07-29 ENCOUNTER — TELEPHONE (OUTPATIENT)
Dept: FAMILY MEDICINE CLINIC | Facility: CLINIC | Age: 59
End: 2024-07-29

## 2024-08-28 ENCOUNTER — OFFICE VISIT (OUTPATIENT)
Dept: FAMILY MEDICINE CLINIC | Facility: CLINIC | Age: 59
End: 2024-08-28
Payer: COMMERCIAL

## 2024-08-28 VITALS
HEIGHT: 67 IN | TEMPERATURE: 98.1 F | SYSTOLIC BLOOD PRESSURE: 120 MMHG | BODY MASS INDEX: 33.9 KG/M2 | WEIGHT: 216 LBS | OXYGEN SATURATION: 93 % | HEART RATE: 63 BPM | DIASTOLIC BLOOD PRESSURE: 80 MMHG

## 2024-08-28 DIAGNOSIS — J34.89 NASAL SEPTAL DEFECT: Primary | ICD-10-CM

## 2024-08-28 PROCEDURE — 99213 OFFICE O/P EST LOW 20 MIN: CPT | Performed by: FAMILY MEDICINE

## 2024-09-09 ENCOUNTER — OFFICE VISIT (OUTPATIENT)
Dept: ENT CLINIC | Age: 59
End: 2024-09-09
Payer: COMMERCIAL

## 2024-09-09 VITALS — HEIGHT: 67 IN | WEIGHT: 216 LBS | BODY MASS INDEX: 33.9 KG/M2 | RESPIRATION RATE: 16 BRPM

## 2024-09-09 DIAGNOSIS — J34.89 NASAL OBSTRUCTION: Primary | ICD-10-CM

## 2024-09-09 DIAGNOSIS — J34.89 NASAL SEPTAL PERFORATION: ICD-10-CM

## 2024-09-09 PROCEDURE — 31231 NASAL ENDOSCOPY DX: CPT | Performed by: STUDENT IN AN ORGANIZED HEALTH CARE EDUCATION/TRAINING PROGRAM

## 2024-09-09 PROCEDURE — 99204 OFFICE O/P NEW MOD 45 MIN: CPT | Performed by: STUDENT IN AN ORGANIZED HEALTH CARE EDUCATION/TRAINING PROGRAM

## 2024-09-09 ASSESSMENT — ENCOUNTER SYMPTOMS
COUGH: 0
FACIAL SWELLING: 0
SHORTNESS OF BREATH: 0
WHEEZING: 0
NAUSEA: 0
EYE ITCHING: 0
DIARRHEA: 0
STRIDOR: 0
EYE PAIN: 0
SINUS PRESSURE: 0
EYE DISCHARGE: 0
APNEA: 0
SINUS PAIN: 0
CHOKING: 0
CONSTIPATION: 0

## 2024-11-25 NOTE — DISCHARGE INSTRUCTIONS
Caller reporting the following red-flag symptom(s): Right eye swollen shut and hurts a lot    Per the system red-flag symptom policy, patient was instructed to:  speak with a Registered Nurse    Action:  Patient warm transferred to a Registered NurseCynthia    HOME TPN with [facility] [employer] [facility] [employer] [facility] as per case managament    Follow-up   On 10/22/21 Dr Cathy Contreras ordered a repeat CT scan to be done on Thursday 11/4/21 if possible in approx 2 weeks. This is supposed to be scheduled over the phone by the radiology outpatient scheduling department. You are to receive a phone call from them soon. Unless Dr Cathy Contreras calls you on November 4 or Nov 5 (after your CT) with instructions, follow-up with Dr Cathy Contreras on Nov 5 in his office. He is in operating room and in wound center that day and not in office, but tell Dr Lynne Lacks office that Dr Cathy Contreras said to get you an appt at 1PM if possible that day anyway. at:  [address], [address]  [phone]  -->  option 1)    Diet  Nothing by mouth except sips of water with medications,   sips of water for dry mouth or dehydration, and up to 3 flavored popsicles (not ice cream or chocolate) a day       Patient Education        Diverticulitis: Care Instructions  Overview     Diverticulitis occurs when pouches form in the wall of the colon and become inflamed or infected. It can be very painful. Doctors aren't sure what causes diverticulitis. There is no proof that foods such as nuts, seeds, or berries cause it or make it worse. A low-fiber diet may cause the colon to work harder to push stool forward. Pouches may form because of this extra work. It may be hard to think about healthy eating while you're in pain. But as you recover, you might think about how you can use healthy eating for overall better health. Healthy eating may help you avoid future attacks. Follow-up care is a key part of your treatment and safety. Be sure to make and go to all appointments, and call your doctor if you are having problems. It's also a good idea to know your test results and keep a list of the medicines you take. How can you care for yourself at home? · Drink plenty of fluids.  If you have kidney, heart, or liver disease and have to limit fluids, talk with your doctor before you increase the amount of fluids you drink. · Stay with liquids or a bland diet (plain rice, bananas, dry toast or crackers, applesauce) until you are feeling better. Then you can return to regular foods and slowly increase the amount of fiber in your diet. · Use a heating pad set on low on your belly to relieve mild cramps and pain. · Get extra rest until you are feeling better. · Be safe with medicines. Read and follow all instructions on the label. ? If the doctor gave you a prescription medicine for pain, take it as prescribed. ? If you are not taking a prescription pain medicine, ask your doctor if you can take an over-the-counter medicine. · If your doctor prescribed antibiotics, take them as directed. Do not stop taking them just because you feel better. You need to take the full course of antibiotics. · Do not use laxatives or enemas unless your doctor tells you to use them. When should you call for help? Call your doctor now or seek immediate medical care if:    · You have a fever.     · You are vomiting.     · You have new or worse belly pain.     · You cannot pass stools or gas. Watch closely for changes in your health, and be sure to contact your doctor if you have any problems. Where can you learn more? Go to http://www.gray.com/  Enter H901 in the search box to learn more about \"Diverticulitis: Care Instructions. \"  Current as of: February 10, 2021               Content Version: 13.0  © 4364-0633 [employer]. Care instructions adapted under license by [employer] (which disclaims liability or warranty for this information). If you have questions about a medical condition or this instruction, always ask your healthcare professional. [address] any warranty or liability for your use of this information.

## 2024-12-11 DIAGNOSIS — Z00.00 ENCOUNTER FOR GENERAL MEDICAL EXAMINATION: ICD-10-CM

## 2024-12-11 DIAGNOSIS — E78.2 MODERATE MIXED HYPERLIPIDEMIA NOT REQUIRING STATIN THERAPY: Primary | ICD-10-CM

## 2024-12-11 DIAGNOSIS — Z12.5 SCREENING PSA (PROSTATE SPECIFIC ANTIGEN): ICD-10-CM

## 2024-12-12 ENCOUNTER — LAB (OUTPATIENT)
Dept: FAMILY MEDICINE CLINIC | Facility: CLINIC | Age: 59
End: 2024-12-12

## 2024-12-12 DIAGNOSIS — Z12.5 SCREENING PSA (PROSTATE SPECIFIC ANTIGEN): ICD-10-CM

## 2024-12-12 DIAGNOSIS — E78.2 MODERATE MIXED HYPERLIPIDEMIA NOT REQUIRING STATIN THERAPY: ICD-10-CM

## 2024-12-12 DIAGNOSIS — Z00.00 ENCOUNTER FOR GENERAL MEDICAL EXAMINATION: ICD-10-CM

## 2024-12-12 LAB
ALBUMIN SERPL-MCNC: 4.1 G/DL (ref 3.5–5)
ALBUMIN/GLOB SERPL: 1.1 (ref 1–1.9)
ALP SERPL-CCNC: 70 U/L (ref 40–129)
ALT SERPL-CCNC: 41 U/L (ref 8–55)
ANION GAP SERPL CALC-SCNC: 11 MMOL/L (ref 7–16)
APPEARANCE UR: CLEAR
AST SERPL-CCNC: 39 U/L (ref 15–37)
BACTERIA URNS QL MICRO: NEGATIVE /HPF
BASOPHILS # BLD: 0.1 K/UL (ref 0–0.2)
BASOPHILS NFR BLD: 1 % (ref 0–2)
BILIRUB SERPL-MCNC: 0.5 MG/DL (ref 0–1.2)
BILIRUB UR QL: NEGATIVE
BUN SERPL-MCNC: 13 MG/DL (ref 6–23)
CALCIUM SERPL-MCNC: 9.8 MG/DL (ref 8.8–10.2)
CASTS URNS QL MICRO: 0 /LPF
CHLORIDE SERPL-SCNC: 102 MMOL/L (ref 98–107)
CHOLEST SERPL-MCNC: 225 MG/DL (ref 0–200)
CO2 SERPL-SCNC: 27 MMOL/L (ref 20–29)
COLOR UR: NORMAL
CREAT SERPL-MCNC: 0.93 MG/DL (ref 0.8–1.3)
CRYSTALS URNS QL MICRO: 0 /LPF
DIFFERENTIAL METHOD BLD: NORMAL
EOSINOPHIL # BLD: 0.2 K/UL (ref 0–0.8)
EOSINOPHIL NFR BLD: 2 % (ref 0.5–7.8)
EPI CELLS #/AREA URNS HPF: NORMAL /HPF (ref 0–5)
ERYTHROCYTE [DISTWIDTH] IN BLOOD BY AUTOMATED COUNT: 12.3 % (ref 11.9–14.6)
GLOBULIN SER CALC-MCNC: 3.9 G/DL (ref 2.3–3.5)
GLUCOSE SERPL-MCNC: 90 MG/DL (ref 70–99)
GLUCOSE UR STRIP.AUTO-MCNC: NEGATIVE MG/DL
HCT VFR BLD AUTO: 43 % (ref 41.1–50.3)
HDLC SERPL-MCNC: 46 MG/DL (ref 40–60)
HDLC SERPL: 4.9 (ref 0–5)
HGB BLD-MCNC: 14.6 G/DL (ref 13.6–17.2)
HGB UR QL STRIP: NEGATIVE
HYALINE CASTS URNS QL MICRO: NORMAL /LPF
IMM GRANULOCYTES # BLD AUTO: 0 K/UL (ref 0–0.5)
IMM GRANULOCYTES NFR BLD AUTO: 0 % (ref 0–5)
KETONES UR QL STRIP.AUTO: NEGATIVE MG/DL
LDLC SERPL CALC-MCNC: 129 MG/DL (ref 0–100)
LEUKOCYTE ESTERASE UR QL STRIP.AUTO: NEGATIVE
LYMPHOCYTES # BLD: 2.9 K/UL (ref 0.5–4.6)
LYMPHOCYTES NFR BLD: 33 % (ref 13–44)
MCH RBC QN AUTO: 30.9 PG (ref 26.1–32.9)
MCHC RBC AUTO-ENTMCNC: 34 G/DL (ref 31.4–35)
MCV RBC AUTO: 91.1 FL (ref 82–102)
MONOCYTES # BLD: 0.6 K/UL (ref 0.1–1.3)
MONOCYTES NFR BLD: 7 % (ref 4–12)
MUCOUS THREADS URNS QL MICRO: 0 /LPF
NEUTS SEG # BLD: 5.2 K/UL (ref 1.7–8.2)
NEUTS SEG NFR BLD: 57 % (ref 43–78)
NITRITE UR QL STRIP.AUTO: NEGATIVE
NRBC # BLD: 0 K/UL (ref 0–0.2)
PH UR STRIP: 7 (ref 5–9)
PLATELET # BLD AUTO: 276 K/UL (ref 150–450)
PMV BLD AUTO: 10.2 FL (ref 9.4–12.3)
POTASSIUM SERPL-SCNC: 4.2 MMOL/L (ref 3.5–5.1)
PROT SERPL-MCNC: 7.9 G/DL (ref 6.3–8.2)
PROT UR STRIP-MCNC: NEGATIVE MG/DL
PSA SERPL-MCNC: 2 NG/ML (ref 0–4)
RBC # BLD AUTO: 4.72 M/UL (ref 4.23–5.6)
RBC #/AREA URNS HPF: NORMAL /HPF (ref 0–5)
SODIUM SERPL-SCNC: 140 MMOL/L (ref 136–145)
SP GR UR REFRACTOMETRY: 1.02 (ref 1–1.02)
TRIGL SERPL-MCNC: 250 MG/DL (ref 0–150)
TSH W FREE THYROID IF ABNORMAL: 1.62 UIU/ML (ref 0.27–4.2)
URINE CULTURE IF INDICATED: NORMAL
UROBILINOGEN UR QL STRIP.AUTO: 0.2 EU/DL (ref 0.2–1)
VLDLC SERPL CALC-MCNC: 50 MG/DL (ref 6–23)
WBC # BLD AUTO: 9 K/UL (ref 4.3–11.1)
WBC URNS QL MICRO: NORMAL /HPF (ref 0–4)

## 2024-12-19 ENCOUNTER — OFFICE VISIT (OUTPATIENT)
Dept: FAMILY MEDICINE CLINIC | Facility: CLINIC | Age: 59
End: 2024-12-19

## 2024-12-19 VITALS
SYSTOLIC BLOOD PRESSURE: 122 MMHG | OXYGEN SATURATION: 95 % | DIASTOLIC BLOOD PRESSURE: 98 MMHG | HEART RATE: 80 BPM | WEIGHT: 223 LBS | BODY MASS INDEX: 35 KG/M2 | HEIGHT: 67 IN | TEMPERATURE: 97.4 F

## 2024-12-19 DIAGNOSIS — F51.02 TRANSIENT DISORDER OF INITIATING OR MAINTAINING SLEEP: ICD-10-CM

## 2024-12-19 DIAGNOSIS — J30.9 ALLERGIC RHINITIS, UNSPECIFIED SEASONALITY, UNSPECIFIED TRIGGER: ICD-10-CM

## 2024-12-19 DIAGNOSIS — R51.9 NONINTRACTABLE HEADACHE, UNSPECIFIED CHRONICITY PATTERN, UNSPECIFIED HEADACHE TYPE: ICD-10-CM

## 2024-12-19 DIAGNOSIS — Z23 NEEDS FLU SHOT: ICD-10-CM

## 2024-12-19 DIAGNOSIS — Z00.01 ENCOUNTER FOR WELL ADULT EXAM WITH ABNORMAL FINDINGS: Primary | ICD-10-CM

## 2024-12-19 DIAGNOSIS — M25.561 RIGHT KNEE PAIN, UNSPECIFIED CHRONICITY: ICD-10-CM

## 2024-12-19 DIAGNOSIS — E78.5 HYPERLIPIDEMIA, UNSPECIFIED HYPERLIPIDEMIA TYPE: ICD-10-CM

## 2024-12-19 DIAGNOSIS — E66.811 OBESITY (BMI 30.0-34.9): ICD-10-CM

## 2024-12-19 DIAGNOSIS — G47.33 OSA (OBSTRUCTIVE SLEEP APNEA): ICD-10-CM

## 2024-12-19 DIAGNOSIS — R03.0 ELEVATED BLOOD PRESSURE READING: ICD-10-CM

## 2024-12-19 PROBLEM — K57.90 DIVERTICULOSIS: Status: ACTIVE | Noted: 2024-12-19

## 2024-12-19 PROBLEM — K57.20 ABSCESS OF SIGMOID COLON DUE TO DIVERTICULITIS: Status: RESOLVED | Noted: 2021-10-14 | Resolved: 2024-12-19

## 2024-12-19 PROBLEM — K57.32 SIGMOID DIVERTICULITIS: Status: RESOLVED | Noted: 2021-10-14 | Resolved: 2024-12-19

## 2024-12-19 RX ORDER — SUMATRIPTAN 50 MG/1
TABLET, FILM COATED ORAL
Qty: 9 TABLET | Refills: 1 | Status: SHIPPED | OUTPATIENT
Start: 2024-12-19

## 2024-12-19 NOTE — PROGRESS NOTES
Well Adult Note  Name: Eleazar Avery Jr. Today’s Date: 2024   MRN: [id number] Sex: Male   Age: 59 y.o. Ethnicity: Non- / Non    : 1965 Race: White (non-)      Eleazar Avery Jr. is here for a well adult exam.       Subjective   History:  Presents for annual exam. Feeling well today. Had blood work done .     New issues include:     Elevated BP. Denies Hx of HTN.  Denies HA, CP,  palpitations, dizziness, weakness, leg swelling, or vision changes. Does not check BP at home.    Current issues include:     HLD untreated. , improved from last year. , worsened from last year. Calcium score of 0 in 2023. Current 10 year ASCVD risk is 7.5%.  Also has been ongoing obesity with current BMI 34. Diet has been okay, occasionally unhealthy. Staying hydrated with water. Not intentionally exercising but reports he remains active at work.     Remains on Claritin for seasonal allergies. In good control on medication.  No recent flareups.    Hx of diverticulosis and diverticulitis but no recurrence of flares since sigmoid colectomy in . No issues since surgery.  Denies any GI symptoms at present.     PAULO treated with mouthpiece nightly.  Was not able to tolerate CPAP mask. Fatigue has improved with application of mouthpiece.  Headaches did improve with treatment of PAULO but continues to have some headaches. Continues to wake up frequently throughout the night, declines further treatment.      Has migraines once per month with headaches 1-2 times per week.  For migraines, he uses Advil dual action then Goody's powder if not relieved by initial medication.  Not on any prescription medications for headaches.  Denies blood in stool or dark, tarry stools with use of Goody's powder.    Has occasional psoriasis on his left elbow. Using steroid cream twice per week with good relief of symptoms.  Denies presence of rash today. Denies any new skin lesions,

## 2024-12-26 PROBLEM — M25.561 RIGHT KNEE PAIN: Status: ACTIVE | Noted: 2024-12-26

## 2024-12-26 PROBLEM — K57.90 DIVERTICULOSIS: Status: RESOLVED | Noted: 2024-12-19 | Resolved: 2024-12-26

## 2024-12-26 PROBLEM — S46.212A RUPTURE OF LEFT DISTAL BICEPS TENDON: Status: RESOLVED | Noted: 2022-12-20 | Resolved: 2024-12-26

## 2024-12-26 ASSESSMENT — ENCOUNTER SYMPTOMS
ABDOMINAL DISTENTION: 0
SINUS PRESSURE: 0
COUGH: 0
RECTAL PAIN: 0
WHEEZING: 0
RHINORRHEA: 0
BLOOD IN STOOL: 0
SHORTNESS OF BREATH: 0
NAUSEA: 0
CHEST TIGHTNESS: 0
DIARRHEA: 0
ANAL BLEEDING: 0
COLOR CHANGE: 0
CONSTIPATION: 0
EYE PAIN: 0
ABDOMINAL PAIN: 0
SORE THROAT: 0
EYE REDNESS: 0
VOMITING: 0
BACK PAIN: 0
SINUS PAIN: 0
EYE DISCHARGE: 0

## (undated) DEVICE — SEALER ENDOSCP L37CM NANO COAT BLNT TIP LAP DIV

## (undated) DEVICE — SPONGE LAP 18X18IN STRL -- 5/PK

## (undated) DEVICE — BASIC SINGLE BASIN-LF: Brand: MEDLINE INDUSTRIES, INC.

## (undated) DEVICE — KENDALL RADIOLUCENT FOAM MONITORING ELECTRODE RECTANGULAR SHAPE: Brand: KENDALL

## (undated) DEVICE — STERILE HOOK LOCK LATEX FREE ELASTIC BANDAGE 6INX5YD: Brand: HOOK LOCK™

## (undated) DEVICE — LOGICUT SCISSOR LENGTH 320MM: Brand: LOGI - LAPAROSCOPIC INSTRUMENT SYSTEM

## (undated) DEVICE — DRAPE,U/SHT,SPLIT,FILM,60X84,STERILE: Brand: MEDLINE

## (undated) DEVICE — Device

## (undated) DEVICE — SURGICAL PROCEDURE PACK BASIC ST FRANCIS

## (undated) DEVICE — TOTAL TRAY, DB, 100% SILI FOLEY, 16FR 10: Brand: MEDLINE

## (undated) DEVICE — INTENDED FOR TISSUE SEPARATION, AND OTHER PROCEDURES THAT REQUIRE A SHARP SURGICAL BLADE TO PUNCTURE OR CUT.: Brand: BARD-PARKER ® STAINLESS STEEL BLADES

## (undated) DEVICE — REM POLYHESIVE ADULT PATIENT RETURN ELECTRODE: Brand: VALLEYLAB

## (undated) DEVICE — INTENT TO BE USED WITH SUTURE MATERIAL FOR TISSUE CLOSURE: Brand: RICHARD-ALLAN®  NEEDLE 1/2 CIRCLE REVERSE CUTTING

## (undated) DEVICE — GOWN,REINF,POLY,ECL,PP SLV,XL: Brand: MEDLINE

## (undated) DEVICE — SUTURE VCRL SZ 3-0 L27IN ABSRB UD L26MM SH 1/2 CIR J416H

## (undated) DEVICE — TUBING INSUFFLATION SMK EVAC HI FLO SET PNEUMOCLEAR

## (undated) DEVICE — 2, DISPOSABLE SUCTION/IRRIGATOR WITHOUT DISPOSABLE TIP: Brand: STRYKEFLOW

## (undated) DEVICE — SPLINT THMB W5XL30IN FBRGLS PD PRECUT LTWT DURABLE FAST SET

## (undated) DEVICE — SOLUTION IRRIG 1000ML 09% SOD CHL USP PIC PLAS CONTAINER

## (undated) DEVICE — DRAIN SURG 19FR 100% SIL RADPQ RND CHN FULL FLUT

## (undated) DEVICE — BLADE SCALP SURG BARD-PARK 10 --

## (undated) DEVICE — SUTURE SZ 0 27IN 5/8 CIR UR-6  TAPER PT VIOLET ABSRB VICRYL J603H

## (undated) DEVICE — SUTURE NONABSORBABLE MONOFILAMENT 4-0 PS-2 18 IN BLU PROLENE 8682H

## (undated) DEVICE — SYR 5ML 1/5 GRAD LL NSAF LF --

## (undated) DEVICE — CONTAINER SPEC HISTOLOGY 900ML POLYPR

## (undated) DEVICE — NEEDLE HYPO 18GA L1.5IN THN WALL PIVOTING SHLD BVL ORIENTED

## (undated) DEVICE — (D)PREP SKN CHLRAPRP APPL 26ML -- CONVERT TO ITEM 371833

## (undated) DEVICE — BUTTON SWITCH PENCIL BLADE ELECTRODE, HOLSTER: Brand: EDGE

## (undated) DEVICE — STAPLER INT L75MM CUT LN L73MM STPL LN L77MM BLU B FRM 8

## (undated) DEVICE — BLADE SHV CUT MENIS AGG + 4MM --

## (undated) DEVICE — 2000CC GUARDIAN II: Brand: GUARDIAN

## (undated) DEVICE — BNDG,ELSTC,MATRIX,STRL,3"X5YD,LF,HOOK&LP: Brand: MEDLINE

## (undated) DEVICE — PADDING CAST W4INXL4YD ST COT COHESIVE HND TEARABLE SPEC

## (undated) DEVICE — SUTURE PERMAHAND SZ 3-0 L18IN NONABSORBABLE BLK L26MM SH C013D

## (undated) DEVICE — BLADE ELECTRODE: Brand: EDGE

## (undated) DEVICE — STANDARD HYPODERMIC NEEDLE,POLYPROPYLENE HUB: Brand: MONOJECT

## (undated) DEVICE — T-DRAPE,EXTREMITY,STERILE: Brand: MEDLINE

## (undated) DEVICE — LAPAROSCOPIC TROCAR SLEEVE/SINGLE USE: Brand: KII® OPTICAL ACCESS SYSTEM

## (undated) DEVICE — SET IRRIG DST FLX M CONN

## (undated) DEVICE — RESERVOIR,SUCTION,100CC,SILICONE: Brand: MEDLINE

## (undated) DEVICE — SUTURE STRATAFIX SPRL MCRYL + SZ 4-0 L12IN ABSRB UD PS-2 SXMP1B117

## (undated) DEVICE — ZIMMER® STERILE DISPOSABLE TOURNIQUET CUFF WITH PLC, DUAL PORT, SINGLE BLADDER, 30 IN. (76 CM)

## (undated) DEVICE — PADDING CAST W3INXL4YD COT BLEND MIC PLEAT UNDERCAST SPEC

## (undated) DEVICE — GARMENT,MEDLINE,DVT,INT,CALF,MED, GEN2: Brand: MEDLINE

## (undated) DEVICE — BLADE SURG NO15 S STL STR DISP GLASSVAN

## (undated) DEVICE — GLOVE ORANGE PI 7 1/2   MSG9075

## (undated) DEVICE — GENERAL LAPAROSCOPY: Brand: MEDLINE INDUSTRIES, INC.

## (undated) DEVICE — YANKAUER,BULB TIP,W/O VENT,RIGID,STERILE: Brand: MEDLINE

## (undated) DEVICE — TRAY PREP DRY W/ PREM GLV 2 APPL 6 SPNG 2 UNDPD 1 OVERWRAP

## (undated) DEVICE — AMD ANTIMICROBIAL GAUZE SPONGES,12 PLY USP TYPE VII, 0.2% POLYHEXAMETHYLENE BIGUANIDE HCI (PHMB): Brand: CURITY

## (undated) DEVICE — SINGLE PORT MANIFOLD: Brand: NEPTUNE 2

## (undated) DEVICE — APPLICATOR COT-TIP WOOD 6IN -- NON STRL

## (undated) DEVICE — SYR LR LCK 1ML GRAD NSAF 30ML --

## (undated) DEVICE — DRAPE TWL SURG 16X26IN BLU ORB04] ALLCARE INC]

## (undated) DEVICE — NDL PRT INJ NSAF BLNT 18GX1.5 --

## (undated) DEVICE — APPLIER CLP L9.38IN M LIG TI DISP STR RNG HNDL LIGACLP

## (undated) DEVICE — PAD,ABDOMINAL,5"X9",ST,LF,25/BX: Brand: MEDLINE INDUSTRIES, INC.

## (undated) DEVICE — DRAPE,UNDERBUTTOCKS,PCH,STERILE: Brand: MEDLINE

## (undated) DEVICE — LITHOTOMY DRAPE: Brand: CONVERTORS

## (undated) DEVICE — SPONGE GZ W4XL4IN COT 12 PLY TYP VII WVN C FLD DSGN

## (undated) DEVICE — 1840 FOAM BLOCK NEEDLE COUNTER: Brand: DEVON

## (undated) DEVICE — 3M™ IOBAN™ 2 ANTIMICROBIAL INCISE DRAPE 6650EZ: Brand: IOBAN™ 2

## (undated) DEVICE — BNDG,ELSTC,MATRIX,STRL,2"X5YD,LF,HOOK&LP: Brand: MEDLINE

## (undated) DEVICE — TROCARS: Brand: KII® BLUNT TIP ACCESS SYSTEM

## (undated) DEVICE — SOLUTION IRRIG 3000ML 0.9% SOD CHL FLX CONT 0797208] ICU MEDICAL INC]

## (undated) DEVICE — CANNULA NSL ORAL AD FOR CAPNOFLEX CO2 O2 AIRLFE

## (undated) DEVICE — HAND PACK: Brand: MEDLINE INDUSTRIES, INC.

## (undated) DEVICE — TROCAR: Brand: KII FIOS FIRST ENTRY

## (undated) DEVICE — CONNECTOR TBNG OD5-7MM O2 END DISP

## (undated) DEVICE — SET IRRIG L94IN ID0.281IN W/ 4.5IN DST FLX CONN 2 LD ON OFF

## (undated) DEVICE — COVER,LIGHT HANDLE,FLX,2/PK: Brand: MEDLINE INDUSTRIES, INC.

## (undated) DEVICE — RELOAD STPL L75MM OPN H3.8MM CLS 1.5MM WIRE DIA0.2MM REG

## (undated) DEVICE — BANDAGE COMPR SELF ADH 5 YDX6 IN TAN STRL PREMIERPRO LF

## (undated) DEVICE — INTENDED TO BE USED TO OCCLUDE, RETRACT AND IDENTIFY ARTERIES, VEINS, TENDONS AND NERVES IN SURGICAL PROCEDURES: Brand: STERION®  VESSEL LOOP

## (undated) DEVICE — ACCESS PLATFORM FOR MINIMALLY INVASIVE SURGERY.: Brand: GELPORT® LAPAROSCOPIC  SYSTEM

## (undated) DEVICE — SYR 3ML LL TIP 1/10ML GRAD --

## (undated) DEVICE — DRAPE,LAP,CHOLE,W/TROUGHS,STERILE: Brand: MEDLINE

## (undated) DEVICE — STOCKINETTE,IMPERVIOUS,12X48,STERILE: Brand: MEDLINE

## (undated) DEVICE — SUTURE VCRL SZ 3-0 L18IN ABSRB UD L26MM SH 1/2 CIR J864D

## (undated) DEVICE — SUTURE PDS + SZ 1 L96IN ABSRB VLT L65MM TP-1 1/2 CIR PDP880G

## (undated) DEVICE — XEROFORM OCCLUSIVE GAUZE STRIP OVERWRAP, 3% BISMUTH TRIBROMOPHENATE IN PETROLATUM BLEND: Brand: XEROFORM

## (undated) DEVICE — TROCAR: Brand: KII® SLEEVE

## (undated) DEVICE — ZIMMER® STERILE DISPOSABLE TOURNIQUET CUFF WITH PLC, DUAL PORT, SINGLE BLADDER, 18 IN. (46 CM)

## (undated) DEVICE — SYR 10ML LUER LOK 1/5ML GRAD --

## (undated) DEVICE — MEDI-VAC YANKAUER SUCTION HANDLE W/BULBOUS TIP: Brand: CARDINAL HEALTH